# Patient Record
Sex: FEMALE | Race: WHITE | Employment: OTHER | URBAN - METROPOLITAN AREA
[De-identification: names, ages, dates, MRNs, and addresses within clinical notes are randomized per-mention and may not be internally consistent; named-entity substitution may affect disease eponyms.]

---

## 2017-05-17 ENCOUNTER — OFFICE VISIT (OUTPATIENT)
Dept: CARDIOLOGY CLINIC | Age: 67
End: 2017-05-17

## 2017-05-17 VITALS
DIASTOLIC BLOOD PRESSURE: 70 MMHG | HEIGHT: 61 IN | OXYGEN SATURATION: 96 % | HEART RATE: 74 BPM | WEIGHT: 113 LBS | BODY MASS INDEX: 21.34 KG/M2 | SYSTOLIC BLOOD PRESSURE: 98 MMHG

## 2017-05-17 DIAGNOSIS — I73.9 PERIPHERAL VASCULAR DISEASE (HCC): ICD-10-CM

## 2017-05-17 DIAGNOSIS — E78.00 PURE HYPERCHOLESTEROLEMIA: ICD-10-CM

## 2017-05-17 DIAGNOSIS — I25.10 CORONARY ARTERY DISEASE INVOLVING NATIVE CORONARY ARTERY OF NATIVE HEART WITHOUT ANGINA PECTORIS: Primary | ICD-10-CM

## 2017-05-17 PROCEDURE — 4040F PNEUMOC VAC/ADMIN/RCVD: CPT | Performed by: INTERNAL MEDICINE

## 2017-05-17 PROCEDURE — G8400 PT W/DXA NO RESULTS DOC: HCPCS | Performed by: INTERNAL MEDICINE

## 2017-05-17 PROCEDURE — 3017F COLORECTAL CA SCREEN DOC REV: CPT | Performed by: INTERNAL MEDICINE

## 2017-05-17 PROCEDURE — 1036F TOBACCO NON-USER: CPT | Performed by: INTERNAL MEDICINE

## 2017-05-17 PROCEDURE — 1123F ACP DISCUSS/DSCN MKR DOCD: CPT | Performed by: INTERNAL MEDICINE

## 2017-05-17 PROCEDURE — G8419 CALC BMI OUT NRM PARAM NOF/U: HCPCS | Performed by: INTERNAL MEDICINE

## 2017-05-17 PROCEDURE — 99214 OFFICE O/P EST MOD 30 MIN: CPT | Performed by: INTERNAL MEDICINE

## 2017-05-17 PROCEDURE — G8427 DOCREV CUR MEDS BY ELIG CLIN: HCPCS | Performed by: INTERNAL MEDICINE

## 2017-05-17 PROCEDURE — 1090F PRES/ABSN URINE INCON ASSESS: CPT | Performed by: INTERNAL MEDICINE

## 2017-05-17 PROCEDURE — 3014F SCREEN MAMMO DOC REV: CPT | Performed by: INTERNAL MEDICINE

## 2017-05-17 PROCEDURE — G8598 ASA/ANTIPLAT THER USED: HCPCS | Performed by: INTERNAL MEDICINE

## 2017-05-17 RX ORDER — METOPROLOL TARTRATE 50 MG/1
25 TABLET, FILM COATED ORAL DAILY
Qty: 45 TABLET | Refills: 3 | Status: SHIPPED | OUTPATIENT
Start: 2017-05-17 | End: 2017-10-26 | Stop reason: SDUPTHER

## 2017-05-17 RX ORDER — NITROGLYCERIN 0.4 MG/1
0.4 TABLET SUBLINGUAL EVERY 5 MIN PRN
Qty: 25 TABLET | Refills: 5 | Status: SHIPPED | OUTPATIENT
Start: 2017-05-17 | End: 2021-10-04 | Stop reason: CLARIF

## 2017-05-17 RX ORDER — CLOPIDOGREL BISULFATE 75 MG/1
75 TABLET ORAL DAILY
Qty: 90 TABLET | Refills: 3 | Status: SHIPPED | OUTPATIENT
Start: 2017-05-17 | End: 2017-10-26 | Stop reason: SDUPTHER

## 2017-05-17 RX ORDER — ROSUVASTATIN CALCIUM 20 MG/1
20 TABLET, COATED ORAL DAILY
Qty: 90 TABLET | Refills: 3 | Status: SHIPPED | OUTPATIENT
Start: 2017-05-17 | End: 2017-11-28 | Stop reason: SDUPTHER

## 2017-05-17 RX ORDER — FUROSEMIDE 40 MG/1
40 TABLET ORAL DAILY PRN
Qty: 90 TABLET | Refills: 3 | Status: SHIPPED | OUTPATIENT
Start: 2017-05-17 | End: 2017-10-26 | Stop reason: SDUPTHER

## 2017-05-17 RX ORDER — ISOSORBIDE MONONITRATE 60 MG/1
60 TABLET, EXTENDED RELEASE ORAL EVERY MORNING
Qty: 90 TABLET | Refills: 3 | Status: SHIPPED | OUTPATIENT
Start: 2017-05-17 | End: 2017-10-26 | Stop reason: SDUPTHER

## 2017-05-17 ASSESSMENT — ENCOUNTER SYMPTOMS
ABDOMINAL PAIN: 0
SHORTNESS OF BREATH: 0
COLOR CHANGE: 0
EYE REDNESS: 0
CHEST TIGHTNESS: 1
EYE PAIN: 0
COUGH: 0
WHEEZING: 0
BLOOD IN STOOL: 0

## 2017-10-26 RX ORDER — FUROSEMIDE 40 MG/1
40 TABLET ORAL DAILY PRN
Qty: 90 TABLET | Refills: 3 | Status: SHIPPED | OUTPATIENT
Start: 2017-10-26 | End: 2017-11-28 | Stop reason: SDUPTHER

## 2017-10-26 RX ORDER — ISOSORBIDE MONONITRATE 60 MG/1
60 TABLET, EXTENDED RELEASE ORAL EVERY MORNING
Qty: 90 TABLET | Refills: 3 | Status: SHIPPED | OUTPATIENT
Start: 2017-10-26 | End: 2017-11-28 | Stop reason: SDUPTHER

## 2017-10-26 RX ORDER — METOPROLOL TARTRATE 50 MG/1
25 TABLET, FILM COATED ORAL DAILY
Qty: 45 TABLET | Refills: 3 | Status: SHIPPED | OUTPATIENT
Start: 2017-10-26 | End: 2017-11-28 | Stop reason: SDUPTHER

## 2017-10-26 RX ORDER — CLOPIDOGREL BISULFATE 75 MG/1
75 TABLET ORAL DAILY
Qty: 90 TABLET | Refills: 3 | Status: SHIPPED | OUTPATIENT
Start: 2017-10-26 | End: 2017-11-28 | Stop reason: SDUPTHER

## 2017-11-28 ENCOUNTER — OFFICE VISIT (OUTPATIENT)
Dept: CARDIOLOGY CLINIC | Age: 67
End: 2017-11-28

## 2017-11-28 VITALS
WEIGHT: 118.2 LBS | HEART RATE: 71 BPM | HEIGHT: 61 IN | BODY MASS INDEX: 22.31 KG/M2 | OXYGEN SATURATION: 98 % | DIASTOLIC BLOOD PRESSURE: 50 MMHG | SYSTOLIC BLOOD PRESSURE: 120 MMHG

## 2017-11-28 DIAGNOSIS — I65.23 BILATERAL CAROTID ARTERY STENOSIS: ICD-10-CM

## 2017-11-28 DIAGNOSIS — R55 VASOVAGAL SYNCOPE: ICD-10-CM

## 2017-11-28 DIAGNOSIS — E78.00 PURE HYPERCHOLESTEROLEMIA: ICD-10-CM

## 2017-11-28 DIAGNOSIS — I25.10 ATHEROSCLEROSIS OF NATIVE CORONARY ARTERY OF NATIVE HEART WITHOUT ANGINA PECTORIS: Primary | ICD-10-CM

## 2017-11-28 PROCEDURE — 1090F PRES/ABSN URINE INCON ASSESS: CPT | Performed by: INTERNAL MEDICINE

## 2017-11-28 PROCEDURE — G8598 ASA/ANTIPLAT THER USED: HCPCS | Performed by: INTERNAL MEDICINE

## 2017-11-28 PROCEDURE — 99214 OFFICE O/P EST MOD 30 MIN: CPT | Performed by: INTERNAL MEDICINE

## 2017-11-28 PROCEDURE — G8400 PT W/DXA NO RESULTS DOC: HCPCS | Performed by: INTERNAL MEDICINE

## 2017-11-28 PROCEDURE — G8427 DOCREV CUR MEDS BY ELIG CLIN: HCPCS | Performed by: INTERNAL MEDICINE

## 2017-11-28 PROCEDURE — G8484 FLU IMMUNIZE NO ADMIN: HCPCS | Performed by: INTERNAL MEDICINE

## 2017-11-28 PROCEDURE — 4040F PNEUMOC VAC/ADMIN/RCVD: CPT | Performed by: INTERNAL MEDICINE

## 2017-11-28 PROCEDURE — 1123F ACP DISCUSS/DSCN MKR DOCD: CPT | Performed by: INTERNAL MEDICINE

## 2017-11-28 PROCEDURE — 3014F SCREEN MAMMO DOC REV: CPT | Performed by: INTERNAL MEDICINE

## 2017-11-28 PROCEDURE — G8420 CALC BMI NORM PARAMETERS: HCPCS | Performed by: INTERNAL MEDICINE

## 2017-11-28 PROCEDURE — 1036F TOBACCO NON-USER: CPT | Performed by: INTERNAL MEDICINE

## 2017-11-28 PROCEDURE — 3017F COLORECTAL CA SCREEN DOC REV: CPT | Performed by: INTERNAL MEDICINE

## 2017-11-28 RX ORDER — CLOPIDOGREL BISULFATE 75 MG/1
75 TABLET ORAL DAILY
Qty: 90 TABLET | Refills: 3 | Status: SHIPPED | OUTPATIENT
Start: 2017-11-28 | End: 2018-06-06 | Stop reason: SDUPTHER

## 2017-11-28 RX ORDER — FUROSEMIDE 40 MG/1
40 TABLET ORAL DAILY PRN
Qty: 90 TABLET | Refills: 3 | Status: SHIPPED | OUTPATIENT
Start: 2017-11-28 | End: 2019-11-20 | Stop reason: DRUGHIGH

## 2017-11-28 RX ORDER — ISOSORBIDE MONONITRATE 60 MG/1
60 TABLET, EXTENDED RELEASE ORAL EVERY MORNING
Qty: 90 TABLET | Refills: 3 | Status: SHIPPED | OUTPATIENT
Start: 2017-11-28 | End: 2018-06-06 | Stop reason: SDUPTHER

## 2017-11-28 RX ORDER — FUROSEMIDE 40 MG/1
40 TABLET ORAL DAILY PRN
Qty: 90 TABLET | Refills: 3 | Status: CANCELLED | OUTPATIENT
Start: 2017-11-28

## 2017-11-28 RX ORDER — ROSUVASTATIN CALCIUM 20 MG/1
20 TABLET, COATED ORAL DAILY
Qty: 90 TABLET | Refills: 3 | Status: SHIPPED | OUTPATIENT
Start: 2017-11-28 | End: 2018-06-06 | Stop reason: SDUPTHER

## 2017-11-28 RX ORDER — METOPROLOL TARTRATE 50 MG/1
25 TABLET, FILM COATED ORAL DAILY
Qty: 45 TABLET | Refills: 3 | Status: SHIPPED | OUTPATIENT
Start: 2017-11-28 | End: 2018-06-06 | Stop reason: SDUPTHER

## 2017-11-28 RX ORDER — CLOPIDOGREL BISULFATE 75 MG/1
75 TABLET ORAL DAILY
Qty: 90 TABLET | Refills: 3 | Status: CANCELLED | OUTPATIENT
Start: 2017-11-28

## 2017-11-28 ASSESSMENT — ENCOUNTER SYMPTOMS
WHEEZING: 0
COLOR CHANGE: 0
EYE PAIN: 0
BLOOD IN STOOL: 0
COUGH: 0
SHORTNESS OF BREATH: 1
ABDOMINAL PAIN: 0
CHEST TIGHTNESS: 0
EYE REDNESS: 0

## 2017-11-28 NOTE — LETTER
[address] Cardiology - [address] [facility] [address]  [address]   [address] [address]  Phone: [phone]  Fax: [phone]    Emigdio Blum MD        November 30, 2017     [address] [place] [facility] [address]    Patient: Tai Dominguez  MR Number: [id number]  YOB: 1950  Date of Visit: 11/28/2017    Dear Dr. Peyton Posey:    HPI Tai Dominguez denies any change in her chronic chest pain, leg swelling and increased dyspnea. She states that she is light-headed and passed out a few months ago  She states that she fell and broke her ribs. She has passed out several times always when standing. Sometimes she passed out without warning, at other times she feels lightheaded before passing out. She reports having a fast heart rate after passing out and at night. Assessment:        CAD (coronary artery disease)     s/p CABG 2000. Cath 9/2010- 1/3 grafts occluded. Patent LIMA-LAD, patent SVG-OM, 60% distal LAD lesion distal to anastamosis, occluded SVG-LCX,, 70% prox native LCx, diffuse RCA dz. Declines redo CABG. Try medical therapy, consider PCI of LAD and LCX in future if medical therapy fails. Nuc GXT 9/23/14 no ischemia. Echo 12/2014 LVEF 55-60%.  Diabetes mellitus     followed by PCP.  Hyperlipidemia     On statin  ( muscle aches on higher doses of statins.)  LDL 78.  Syncope     Likely secondary to medications and hypotension. R/o arrythymia.  Lung mass      benign    Carotid artery disease - UL 4/2017 50-69% BICAS. Hypotension -chronic, asymptomatic. Improved. Intrascapular pain/ right sided chest soreness -chronic since CABG, unchanged. PAD/Claudication- Bilateral occlusion of posterior tibial arteries, no evidence of aneurysm. Evaluated by Dr. Jeannie Willams in past.                  Plan:       No evidence of CHF. No angina.  In view of syncope/falls, will arrange event monitor to exclude arrhythmia. Advised to wear GABBIE hose. Orthostatic precautions discussed. Consider loop recorder. Continue ongoing risk factor modification. Fasting lipid profile, CMP prior to next visit. Advised to call if recurrent syncope. If you have questions, please do not hesitate to call me. I look forward to following Lisa Romero along with you.     Sincerely,        Toby Griffith MD

## 2017-11-28 NOTE — PROGRESS NOTES
heard.  Pulmonary/Chest: Effort normal and breath sounds normal.   Abdominal: Soft. Bowel sounds are normal.   Musculoskeletal: Normal range of motion. Neurological: She is alert and oriented to person, place, and time. Skin: Skin is warm and dry. Psychiatric: She has a normal mood and affect. Her behavior is normal.   Nursing note and vitals reviewed. Blood pressure (!) 120/50, pulse 71, height 5' 1\" (1.549 m), weight 118 lb 3.2 oz (53.6 kg), SpO2 98 %. Vitals:    11/28/17 1528 11/28/17 1532 11/28/17 1533   BP: 132/78 120/70 (!) 120/50   Site: Right Arm Right Arm Right Arm   Position: Supine Sitting Standing   Cuff Size: Medium Adult Medium Adult Medium Adult   Pulse: 68 68 71   SpO2: 98%     Weight: 118 lb 3.2 oz (53.6 kg)     Height: 5' 1\" (1.549 m)       Body mass index is 22.33 kg/m².      Wt Readings from Last 3 Encounters:   11/28/17 118 lb 3.2 oz (53.6 kg)   05/17/17 113 lb (51.3 kg)   10/12/16 110 lb 12.8 oz (50.3 kg)     BP Readings from Last 3 Encounters:   11/28/17 (!) 120/50   05/17/17 98/70   10/12/16 120/60        Current Outpatient Prescriptions   Medication Sig Dispense Refill    clopidogrel (PLAVIX) 75 MG tablet Take 1 tablet by mouth daily 90 tablet 3    furosemide (LASIX) 40 MG tablet Take 1 tablet by mouth daily as needed (for edema) 90 tablet 3    isosorbide mononitrate (IMDUR) 60 MG extended release tablet Take 1 tablet by mouth every morning 90 tablet 3    metoprolol tartrate (LOPRESSOR) 50 MG tablet Take 0.5 tablets by mouth daily 45 tablet 3    rosuvastatin (CRESTOR) 20 MG tablet Take 1 tablet by mouth daily 90 tablet 3    nitroGLYCERIN (NITROSTAT) 0.4 MG SL tablet Place 1 tablet under the tongue every 5 minutes as needed for Chest pain 25 tablet 5    Insulin Degludec (TRESIBA FLEXTOUCH SC) Inject into the skin      metFORMIN (GLUCOPHAGE) 500 MG tablet Take 1,000 mg by mouth daily (with breakfast)       ALPRAZolam (XANAX) 0.25 MG tablet Take 0.25 mg by mouth nightly as needed.  aspirin 325 MG tablet Take 1 tablet by mouth daily. 90 tablet 3    oxycodone-acetaminophen (PERCOCET) 5-325 MG per tablet Take 1 tablet by mouth 2 times daily as needed. No current facility-administered medications for this visit. Past Surgical History:   Procedure Laterality Date    BREAST SURGERY      left breast tumor removed    CARDIAC SURGERY  2000    CABG    CHOLECYSTECTOMY      CORONARY ARTERY BYPASS GRAFT      KNEE SURGERY       Social History   Substance Use Topics    Smoking status: Never Smoker    Smokeless tobacco: Never Used    Alcohol use No     Allergies   Allergen Reactions    Iodine Hives     Family History   Problem Relation Age of Onset    Stroke Mother     Heart Disease Father        Recent labs and imaging reviewed. Assessment:        CAD (coronary artery disease)     s/p CABG 2000. Cath 9/2010- 1/3 grafts occluded. Patent LIMA-LAD, patent SVG-OM, 60% distal LAD lesion distal to anastamosis, occluded SVG-LCX,, 70% prox native LCx, diffuse RCA dz. Declines redo CABG. Try medical therapy, consider PCI of LAD and LCX in future if medical therapy fails. Nuc GXT 9/23/14 no ischemia. Echo 12/2014 LVEF 55-60%.  Diabetes mellitus     followed by PCP.  Hyperlipidemia     On statin  ( muscle aches on higher doses of statins.)  LDL 78.  Syncope     Likely secondary to medications and hypotension. R/o arrythymia.  Lung mass      benign    Carotid artery disease - UL 4/2017 50-69% BICAS. Hypotension -chronic, asymptomatic. Improved. Intrascapular pain/ right sided chest soreness -chronic since CABG, unchanged. PAD/Claudication- Bilateral occlusion of posterior tibial arteries, no evidence of aneurysm. Evaluated by Dr. Jeannie Willams in past.                  Plan:       No evidence of CHF. No angina. In view of syncope/falls, will arrange event monitor to exclude arrhythmia. Advised to wear GABBIE hose.  Orthostatic precautions

## 2017-11-30 NOTE — COMMUNICATION BODY
HPI Cristofer Lemus denies any change in her chronic chest pain, leg swelling and increased dyspnea. She states that she is light-headed and passed out a few months ago  She states that she fell and broke her ribs. She has passed out several times always when standing. Sometimes she passed out without warning, at other times she feels lightheaded before passing out. She reports having a fast heart rate after passing out and at night. Assessment:        CAD (coronary artery disease)     s/p CABG 2000. Cath 9/2010- 1/3 grafts occluded. Patent LIMA-LAD, patent SVG-OM, 60% distal LAD lesion distal to anastamosis, occluded SVG-LCX,, 70% prox native LCx, diffuse RCA dz. Declines redo CABG. Try medical therapy, consider PCI of LAD and LCX in future if medical therapy fails. Nuc GXT 9/23/14 no ischemia. Echo 12/2014 LVEF 55-60%.  Diabetes mellitus     followed by PCP.  Hyperlipidemia     On statin  ( muscle aches on higher doses of statins.)  LDL 78.  Syncope     Likely secondary to medications and hypotension. R/o arrythymia.  Lung mass      benign    Carotid artery disease - UL 4/2017 50-69% BICAS. Hypotension -chronic, asymptomatic. Improved. Intrascapular pain/ right sided chest soreness -chronic since CABG, unchanged. PAD/Claudication- Bilateral occlusion of posterior tibial arteries, no evidence of aneurysm. Evaluated by Dr. Sergei Hinojosa in past.                  Plan:       No evidence of CHF. No angina. In view of syncope/falls, will arrange event monitor to exclude arrhythmia. Advised to wear GABBIE hose. Orthostatic precautions discussed. Consider loop recorder. Continue ongoing risk factor modification. Fasting lipid profile, CMP prior to next visit. Advised to call if recurrent syncope.

## 2018-06-05 PROBLEM — E78.00 PURE HYPERCHOLESTEROLEMIA: Status: ACTIVE | Noted: 2018-06-05

## 2018-06-05 ASSESSMENT — ENCOUNTER SYMPTOMS
ABDOMINAL PAIN: 0
EYE PAIN: 0
BLOOD IN STOOL: 0
SHORTNESS OF BREATH: 0
CHEST TIGHTNESS: 0
COUGH: 0
WHEEZING: 0
COLOR CHANGE: 0
EYE REDNESS: 0

## 2018-06-06 ENCOUNTER — OFFICE VISIT (OUTPATIENT)
Dept: CARDIOLOGY CLINIC | Age: 68
End: 2018-06-06

## 2018-06-06 VITALS
DIASTOLIC BLOOD PRESSURE: 80 MMHG | BODY MASS INDEX: 21.71 KG/M2 | WEIGHT: 115 LBS | HEIGHT: 61 IN | SYSTOLIC BLOOD PRESSURE: 120 MMHG | HEART RATE: 71 BPM | OXYGEN SATURATION: 98 %

## 2018-06-06 DIAGNOSIS — I65.23 BILATERAL CAROTID ARTERY STENOSIS: ICD-10-CM

## 2018-06-06 DIAGNOSIS — I25.118 CORONARY ARTERY DISEASE OF NATIVE ARTERY OF NATIVE HEART WITH STABLE ANGINA PECTORIS (HCC): Primary | ICD-10-CM

## 2018-06-06 DIAGNOSIS — R55 VASOVAGAL SYNCOPE: ICD-10-CM

## 2018-06-06 DIAGNOSIS — E78.00 PURE HYPERCHOLESTEROLEMIA: ICD-10-CM

## 2018-06-06 PROCEDURE — G8427 DOCREV CUR MEDS BY ELIG CLIN: HCPCS | Performed by: INTERNAL MEDICINE

## 2018-06-06 PROCEDURE — G8420 CALC BMI NORM PARAMETERS: HCPCS | Performed by: INTERNAL MEDICINE

## 2018-06-06 PROCEDURE — G8598 ASA/ANTIPLAT THER USED: HCPCS | Performed by: INTERNAL MEDICINE

## 2018-06-06 PROCEDURE — 4040F PNEUMOC VAC/ADMIN/RCVD: CPT | Performed by: INTERNAL MEDICINE

## 2018-06-06 PROCEDURE — G8400 PT W/DXA NO RESULTS DOC: HCPCS | Performed by: INTERNAL MEDICINE

## 2018-06-06 PROCEDURE — 3017F COLORECTAL CA SCREEN DOC REV: CPT | Performed by: INTERNAL MEDICINE

## 2018-06-06 PROCEDURE — 1090F PRES/ABSN URINE INCON ASSESS: CPT | Performed by: INTERNAL MEDICINE

## 2018-06-06 PROCEDURE — 1036F TOBACCO NON-USER: CPT | Performed by: INTERNAL MEDICINE

## 2018-06-06 PROCEDURE — 1123F ACP DISCUSS/DSCN MKR DOCD: CPT | Performed by: INTERNAL MEDICINE

## 2018-06-06 PROCEDURE — 99214 OFFICE O/P EST MOD 30 MIN: CPT | Performed by: INTERNAL MEDICINE

## 2018-06-06 RX ORDER — ISOSORBIDE MONONITRATE 60 MG/1
60 TABLET, EXTENDED RELEASE ORAL EVERY MORNING
Qty: 90 TABLET | Refills: 3 | Status: SHIPPED | OUTPATIENT
Start: 2018-06-06 | End: 2019-12-04 | Stop reason: DRUGHIGH

## 2018-06-06 RX ORDER — ROSUVASTATIN CALCIUM 20 MG/1
20 TABLET, COATED ORAL DAILY
Qty: 90 TABLET | Refills: 3 | Status: ON HOLD | OUTPATIENT
Start: 2018-06-06 | End: 2019-10-11

## 2018-06-06 RX ORDER — MECLIZINE HYDROCHLORIDE 25 MG/1
25 TABLET ORAL 3 TIMES DAILY PRN
Qty: 90 TABLET | Refills: 0 | Status: SHIPPED | OUTPATIENT
Start: 2018-06-06 | End: 2018-06-16

## 2018-06-06 RX ORDER — ASPIRIN 325 MG
325 TABLET ORAL DAILY
Qty: 90 TABLET | Refills: 3 | Status: ON HOLD | OUTPATIENT
Start: 2018-06-06 | End: 2019-10-11

## 2018-06-06 RX ORDER — METOPROLOL TARTRATE 50 MG/1
25 TABLET, FILM COATED ORAL DAILY
Qty: 45 TABLET | Refills: 3 | Status: ON HOLD | OUTPATIENT
Start: 2018-06-06 | End: 2019-10-11

## 2018-06-06 RX ORDER — CLOPIDOGREL BISULFATE 75 MG/1
75 TABLET ORAL DAILY
Qty: 90 TABLET | Refills: 3 | Status: ON HOLD | OUTPATIENT
Start: 2018-06-06 | End: 2019-11-26 | Stop reason: HOSPADM

## 2018-06-21 DIAGNOSIS — I65.23 BILATERAL CAROTID ARTERY STENOSIS: Primary | ICD-10-CM

## 2019-10-10 ENCOUNTER — HOSPITAL ENCOUNTER (INPATIENT)
Dept: CARDIAC CATH/INVASIVE PROCEDURES | Age: 69
LOS: 5 days | Discharge: HOME HEALTH | DRG: 282 | End: 2019-10-15
Attending: INTERNAL MEDICINE | Admitting: INTERNAL MEDICINE
Payer: MEDICARE

## 2019-10-10 PROBLEM — D64.9 ANEMIA: Status: ACTIVE | Noted: 2019-10-10

## 2019-10-10 PROBLEM — Z98.890 STATUS POST LEFT HEART CATHETERIZATION: Status: ACTIVE | Noted: 2019-10-10

## 2019-10-10 LAB
ALBUMIN SERPL-MCNC: 3.1 G/DL (ref 3.4–5)
ALP BLD-CCNC: 77 U/L (ref 40–129)
ALT SERPL-CCNC: 14 U/L (ref 10–40)
AST SERPL-CCNC: 16 U/L (ref 15–37)
BILIRUB SERPL-MCNC: <0.2 MG/DL (ref 0–1)
BILIRUBIN DIRECT: <0.2 MG/DL (ref 0–0.3)
BILIRUBIN, INDIRECT: ABNORMAL MG/DL (ref 0–1)
FERRITIN: 282.1 NG/ML (ref 15–150)
FOLATE: 12.88 NG/ML (ref 4.78–24.2)
GLUCOSE BLD-MCNC: 557 MG/DL (ref 70–99)
GLUCOSE BLD-MCNC: 590 MG/DL (ref 70–99)
GLUCOSE BLD-MCNC: 598 MG/DL (ref 70–99)
HCT VFR BLD CALC: 28.5 % (ref 36–48)
IMMATURE RETIC FRACT: 0.61 (ref 0.21–0.37)
INR BLD: 0.96 (ref 0.86–1.14)
IRON SATURATION: 35 % (ref 15–50)
IRON: 70 UG/DL (ref 37–145)
PERFORMED ON: ABNORMAL
PERFORMED ON: ABNORMAL
PROTHROMBIN TIME: 10.9 SEC (ref 9.8–13)
RETICULOCYTE ABSOLUTE COUNT: 0.1 M/UL (ref 0.02–0.1)
RETICULOCYTE COUNT PCT: 3.23 % (ref 0.5–2.18)
TOTAL IRON BINDING CAPACITY: 202 UG/DL (ref 260–445)
TOTAL PROTEIN: 5.1 G/DL (ref 6.4–8.2)
TROPONIN: 0.03 NG/ML
VITAMIN B-12: 285 PG/ML (ref 211–911)

## 2019-10-10 PROCEDURE — 99223 1ST HOSP IP/OBS HIGH 75: CPT | Performed by: INTERNAL MEDICINE

## 2019-10-10 PROCEDURE — 6360000002 HC RX W HCPCS: Performed by: INTERNAL MEDICINE

## 2019-10-10 PROCEDURE — 82607 VITAMIN B-12: CPT

## 2019-10-10 PROCEDURE — 85045 AUTOMATED RETICULOCYTE COUNT: CPT

## 2019-10-10 PROCEDURE — 2580000003 HC RX 258: Performed by: INTERNAL MEDICINE

## 2019-10-10 PROCEDURE — 82746 ASSAY OF FOLIC ACID SERUM: CPT

## 2019-10-10 PROCEDURE — 82947 ASSAY GLUCOSE BLOOD QUANT: CPT

## 2019-10-10 PROCEDURE — 6370000000 HC RX 637 (ALT 250 FOR IP): Performed by: INTERNAL MEDICINE

## 2019-10-10 PROCEDURE — 83540 ASSAY OF IRON: CPT

## 2019-10-10 PROCEDURE — 80076 HEPATIC FUNCTION PANEL: CPT

## 2019-10-10 PROCEDURE — C9113 INJ PANTOPRAZOLE SODIUM, VIA: HCPCS | Performed by: INTERNAL MEDICINE

## 2019-10-10 PROCEDURE — 85610 PROTHROMBIN TIME: CPT

## 2019-10-10 PROCEDURE — 85347 COAGULATION TIME ACTIVATED: CPT

## 2019-10-10 PROCEDURE — 36415 COLL VENOUS BLD VENIPUNCTURE: CPT

## 2019-10-10 PROCEDURE — 84484 ASSAY OF TROPONIN QUANT: CPT

## 2019-10-10 PROCEDURE — 82728 ASSAY OF FERRITIN: CPT

## 2019-10-10 PROCEDURE — 99212 OFFICE O/P EST SF 10 MIN: CPT

## 2019-10-10 PROCEDURE — 83550 IRON BINDING TEST: CPT

## 2019-10-10 PROCEDURE — 2060000000 HC ICU INTERMEDIATE R&B

## 2019-10-10 RX ORDER — SODIUM CHLORIDE 0.9 % (FLUSH) 0.9 %
10 SYRINGE (ML) INJECTION PRN
Status: DISCONTINUED | OUTPATIENT
Start: 2019-10-10 | End: 2019-10-15 | Stop reason: HOSPADM

## 2019-10-10 RX ORDER — ALPRAZOLAM 0.25 MG/1
0.25 TABLET ORAL NIGHTLY PRN
Status: DISCONTINUED | OUTPATIENT
Start: 2019-10-10 | End: 2019-10-15 | Stop reason: HOSPADM

## 2019-10-10 RX ORDER — ASPIRIN 81 MG/1
81 TABLET, CHEWABLE ORAL DAILY
Status: DISCONTINUED | OUTPATIENT
Start: 2019-10-11 | End: 2019-10-10

## 2019-10-10 RX ORDER — ONDANSETRON 2 MG/ML
4 INJECTION INTRAMUSCULAR; INTRAVENOUS EVERY 6 HOURS PRN
Status: DISCONTINUED | OUTPATIENT
Start: 2019-10-10 | End: 2019-10-15 | Stop reason: HOSPADM

## 2019-10-10 RX ORDER — ONDANSETRON 2 MG/ML
4 INJECTION INTRAMUSCULAR; INTRAVENOUS EVERY 6 HOURS PRN
Status: DISCONTINUED | OUTPATIENT
Start: 2019-10-10 | End: 2019-10-10 | Stop reason: SDUPTHER

## 2019-10-10 RX ORDER — ROSUVASTATIN CALCIUM 10 MG/1
20 TABLET, COATED ORAL DAILY
Status: DISCONTINUED | OUTPATIENT
Start: 2019-10-10 | End: 2019-10-10

## 2019-10-10 RX ORDER — OXYCODONE HYDROCHLORIDE AND ACETAMINOPHEN 5; 325 MG/1; MG/1
1 TABLET ORAL 2 TIMES DAILY PRN
Status: DISCONTINUED | OUTPATIENT
Start: 2019-10-10 | End: 2019-10-15 | Stop reason: HOSPADM

## 2019-10-10 RX ORDER — ACETAMINOPHEN 325 MG/1
650 TABLET ORAL EVERY 4 HOURS PRN
Status: DISCONTINUED | OUTPATIENT
Start: 2019-10-10 | End: 2019-10-15 | Stop reason: HOSPADM

## 2019-10-10 RX ORDER — SODIUM CHLORIDE 0.9 % (FLUSH) 0.9 %
10 SYRINGE (ML) INJECTION EVERY 12 HOURS SCHEDULED
Status: DISCONTINUED | OUTPATIENT
Start: 2019-10-10 | End: 2019-10-10 | Stop reason: SDUPTHER

## 2019-10-10 RX ORDER — DEXTROSE MONOHYDRATE 25 G/50ML
12.5 INJECTION, SOLUTION INTRAVENOUS PRN
Status: DISCONTINUED | OUTPATIENT
Start: 2019-10-10 | End: 2019-10-15 | Stop reason: HOSPADM

## 2019-10-10 RX ORDER — 0.9 % SODIUM CHLORIDE 0.9 %
10 VIAL (ML) INJECTION 2 TIMES DAILY
Status: DISCONTINUED | OUTPATIENT
Start: 2019-10-10 | End: 2019-10-15

## 2019-10-10 RX ORDER — NICOTINE POLACRILEX 4 MG
15 LOZENGE BUCCAL PRN
Status: DISCONTINUED | OUTPATIENT
Start: 2019-10-10 | End: 2019-10-15 | Stop reason: HOSPADM

## 2019-10-10 RX ORDER — SODIUM CHLORIDE 0.9 % (FLUSH) 0.9 %
10 SYRINGE (ML) INJECTION PRN
Status: DISCONTINUED | OUTPATIENT
Start: 2019-10-10 | End: 2019-10-10 | Stop reason: SDUPTHER

## 2019-10-10 RX ORDER — ROSUVASTATIN CALCIUM 10 MG/1
20 TABLET, COATED ORAL NIGHTLY
Status: DISCONTINUED | OUTPATIENT
Start: 2019-10-10 | End: 2019-10-11

## 2019-10-10 RX ORDER — CLOPIDOGREL BISULFATE 75 MG/1
75 TABLET ORAL DAILY
Status: DISCONTINUED | OUTPATIENT
Start: 2019-10-11 | End: 2019-10-15 | Stop reason: HOSPADM

## 2019-10-10 RX ORDER — DEXTROSE MONOHYDRATE 50 MG/ML
100 INJECTION, SOLUTION INTRAVENOUS PRN
Status: DISCONTINUED | OUTPATIENT
Start: 2019-10-10 | End: 2019-10-15 | Stop reason: HOSPADM

## 2019-10-10 RX ORDER — ASPIRIN 81 MG/1
81 TABLET, CHEWABLE ORAL DAILY
Status: DISCONTINUED | OUTPATIENT
Start: 2019-10-10 | End: 2019-10-15 | Stop reason: HOSPADM

## 2019-10-10 RX ORDER — SODIUM CHLORIDE 0.9 % (FLUSH) 0.9 %
10 SYRINGE (ML) INJECTION EVERY 12 HOURS SCHEDULED
Status: DISCONTINUED | OUTPATIENT
Start: 2019-10-10 | End: 2019-10-15 | Stop reason: HOSPADM

## 2019-10-10 RX ORDER — PANTOPRAZOLE SODIUM 40 MG/10ML
40 INJECTION, POWDER, LYOPHILIZED, FOR SOLUTION INTRAVENOUS 2 TIMES DAILY
Status: DISCONTINUED | OUTPATIENT
Start: 2019-10-10 | End: 2019-10-15

## 2019-10-10 RX ADMIN — INSULIN LISPRO 3 UNITS: 100 INJECTION, SOLUTION INTRAVENOUS; SUBCUTANEOUS at 22:45

## 2019-10-10 RX ADMIN — PANTOPRAZOLE SODIUM 40 MG: 40 INJECTION, POWDER, FOR SOLUTION INTRAVENOUS at 22:46

## 2019-10-10 RX ADMIN — ROSUVASTATIN CALCIUM 20 MG: 10 TABLET, FILM COATED ORAL at 22:46

## 2019-10-10 RX ADMIN — METOPROLOL TARTRATE 25 MG: 25 TABLET ORAL at 22:46

## 2019-10-10 RX ADMIN — Medication 10 ML: at 22:55

## 2019-10-10 RX ADMIN — Medication 10 ML: at 22:46

## 2019-10-10 RX ADMIN — ASPIRIN 81 MG 81 MG: 81 TABLET ORAL at 22:55

## 2019-10-11 PROBLEM — I21.4 NSTEMI (NON-ST ELEVATED MYOCARDIAL INFARCTION) (HCC): Status: ACTIVE | Noted: 2019-10-11

## 2019-10-11 LAB
ALBUMIN SERPL-MCNC: 3.1 G/DL (ref 3.4–5)
ANION GAP SERPL CALCULATED.3IONS-SCNC: 17 MMOL/L (ref 3–16)
BASOPHILS ABSOLUTE: 0 K/UL (ref 0–0.2)
BASOPHILS RELATIVE PERCENT: 0.3 %
BILIRUBIN URINE: NEGATIVE
BLOOD, URINE: NEGATIVE
BUN BLDV-MCNC: 35 MG/DL (ref 7–20)
CALCIUM SERPL-MCNC: 8.6 MG/DL (ref 8.3–10.6)
CHLORIDE BLD-SCNC: 104 MMOL/L (ref 99–110)
CLARITY: CLEAR
CO2: 17 MMOL/L (ref 21–32)
COLOR: YELLOW
CREAT SERPL-MCNC: 1.2 MG/DL (ref 0.6–1.2)
EKG ATRIAL RATE: 57 BPM
EKG DIAGNOSIS: NORMAL
EKG P AXIS: 36 DEGREES
EKG P-R INTERVAL: 174 MS
EKG Q-T INTERVAL: 440 MS
EKG QRS DURATION: 80 MS
EKG QTC CALCULATION (BAZETT): 428 MS
EKG R AXIS: 27 DEGREES
EKG T AXIS: 101 DEGREES
EKG VENTRICULAR RATE: 57 BPM
EOSINOPHILS ABSOLUTE: 0 K/UL (ref 0–0.6)
EOSINOPHILS RELATIVE PERCENT: 0.1 %
GFR AFRICAN AMERICAN: 54
GFR NON-AFRICAN AMERICAN: 45
GLUCOSE BLD-MCNC: 238 MG/DL (ref 70–99)
GLUCOSE BLD-MCNC: 241 MG/DL (ref 70–99)
GLUCOSE BLD-MCNC: 279 MG/DL (ref 70–99)
GLUCOSE BLD-MCNC: 301 MG/DL (ref 70–99)
GLUCOSE BLD-MCNC: 312 MG/DL (ref 70–99)
GLUCOSE BLD-MCNC: 415 MG/DL (ref 70–99)
GLUCOSE BLD-MCNC: 517 MG/DL (ref 70–99)
GLUCOSE URINE: 250 MG/DL
HAPTOGLOBIN: 139 MG/DL (ref 30–200)
HCT VFR BLD CALC: 27.7 % (ref 36–48)
HEMOGLOBIN: 9.5 G/DL (ref 12–16)
KETONES, URINE: NEGATIVE MG/DL
LEUKOCYTE ESTERASE, URINE: NEGATIVE
LYMPHOCYTES ABSOLUTE: 0.8 K/UL (ref 1–5.1)
LYMPHOCYTES RELATIVE PERCENT: 10.2 %
MCH RBC QN AUTO: 32.1 PG (ref 26–34)
MCHC RBC AUTO-ENTMCNC: 34.4 G/DL (ref 31–36)
MCV RBC AUTO: 93.2 FL (ref 80–100)
MICROSCOPIC EXAMINATION: YES
MONOCYTES ABSOLUTE: 0.5 K/UL (ref 0–1.3)
MONOCYTES RELATIVE PERCENT: 6.3 %
NEUTROPHILS ABSOLUTE: 6.8 K/UL (ref 1.7–7.7)
NEUTROPHILS RELATIVE PERCENT: 83.1 %
NITRITE, URINE: NEGATIVE
OCCULT BLOOD DIAGNOSTIC: NORMAL
PDW BLD-RTO: 13.2 % (ref 12.4–15.4)
PERFORMED ON: ABNORMAL
PH UA: 5.5 (ref 5–8)
PHOSPHORUS: 4.1 MG/DL (ref 2.5–4.9)
PLATELET # BLD: 156 K/UL (ref 135–450)
PMV BLD AUTO: 10.5 FL (ref 5–10.5)
POC ACT LR: 151 SEC
POTASSIUM SERPL-SCNC: 4.3 MMOL/L (ref 3.5–5.1)
PROTEIN UA: 100 MG/DL
RBC # BLD: 2.97 M/UL (ref 4–5.2)
RBC UA: ABNORMAL /HPF (ref 0–2)
SODIUM BLD-SCNC: 138 MMOL/L (ref 136–145)
SPECIFIC GRAVITY UA: 1.01 (ref 1–1.03)
TROPONIN: 0.03 NG/ML
TROPONIN: 0.04 NG/ML
URINE TYPE: ABNORMAL
UROBILINOGEN, URINE: 0.2 E.U./DL
WBC # BLD: 8.2 K/UL (ref 4–11)
WBC UA: ABNORMAL /HPF (ref 0–5)

## 2019-10-11 PROCEDURE — 80069 RENAL FUNCTION PANEL: CPT

## 2019-10-11 PROCEDURE — 81001 URINALYSIS AUTO W/SCOPE: CPT

## 2019-10-11 PROCEDURE — 6360000002 HC RX W HCPCS: Performed by: INTERNAL MEDICINE

## 2019-10-11 PROCEDURE — 99232 SBSQ HOSP IP/OBS MODERATE 35: CPT | Performed by: NURSE PRACTITIONER

## 2019-10-11 PROCEDURE — 85025 COMPLETE CBC W/AUTO DIFF WBC: CPT

## 2019-10-11 PROCEDURE — 2060000000 HC ICU INTERMEDIATE R&B

## 2019-10-11 PROCEDURE — 6370000000 HC RX 637 (ALT 250 FOR IP): Performed by: INTERNAL MEDICINE

## 2019-10-11 PROCEDURE — 93005 ELECTROCARDIOGRAM TRACING: CPT | Performed by: INTERNAL MEDICINE

## 2019-10-11 PROCEDURE — 84484 ASSAY OF TROPONIN QUANT: CPT

## 2019-10-11 PROCEDURE — C9113 INJ PANTOPRAZOLE SODIUM, VIA: HCPCS | Performed by: INTERNAL MEDICINE

## 2019-10-11 PROCEDURE — 6370000000 HC RX 637 (ALT 250 FOR IP): Performed by: NURSE PRACTITIONER

## 2019-10-11 PROCEDURE — 84155 ASSAY OF PROTEIN SERUM: CPT

## 2019-10-11 PROCEDURE — 2580000003 HC RX 258: Performed by: INTERNAL MEDICINE

## 2019-10-11 PROCEDURE — 84165 PROTEIN E-PHORESIS SERUM: CPT

## 2019-10-11 PROCEDURE — 36415 COLL VENOUS BLD VENIPUNCTURE: CPT

## 2019-10-11 PROCEDURE — 83010 ASSAY OF HAPTOGLOBIN QUANT: CPT

## 2019-10-11 PROCEDURE — 93010 ELECTROCARDIOGRAM REPORT: CPT | Performed by: INTERNAL MEDICINE

## 2019-10-11 PROCEDURE — G0328 FECAL BLOOD SCRN IMMUNOASSAY: HCPCS

## 2019-10-11 RX ORDER — PAROXETINE HYDROCHLORIDE 20 MG/1
20 TABLET, FILM COATED ORAL EVERY MORNING
COMMUNITY
End: 2021-10-04 | Stop reason: CLARIF

## 2019-10-11 RX ORDER — POTASSIUM CHLORIDE 750 MG/1
10 TABLET, FILM COATED, EXTENDED RELEASE ORAL DAILY
COMMUNITY
End: 2021-10-04 | Stop reason: CLARIF

## 2019-10-11 RX ORDER — ASPIRIN 81 MG/1
81 TABLET, CHEWABLE ORAL DAILY
COMMUNITY
End: 2021-10-04 | Stop reason: CLARIF

## 2019-10-11 RX ORDER — INSULIN GLARGINE 100 [IU]/ML
20 INJECTION, SOLUTION SUBCUTANEOUS
Status: DISCONTINUED | OUTPATIENT
Start: 2019-10-11 | End: 2019-10-15 | Stop reason: HOSPADM

## 2019-10-11 RX ORDER — ROSUVASTATIN CALCIUM 10 MG/1
40 TABLET, COATED ORAL NIGHTLY
Status: DISCONTINUED | OUTPATIENT
Start: 2019-10-11 | End: 2019-10-15 | Stop reason: HOSPADM

## 2019-10-11 RX ORDER — METOPROLOL SUCCINATE 25 MG/1
25 TABLET, EXTENDED RELEASE ORAL DAILY
Status: DISCONTINUED | OUTPATIENT
Start: 2019-10-12 | End: 2019-10-12

## 2019-10-11 RX ORDER — GABAPENTIN 100 MG/1
100 CAPSULE ORAL 3 TIMES DAILY
COMMUNITY

## 2019-10-11 RX ORDER — ROSUVASTATIN CALCIUM 40 MG/1
40 TABLET, COATED ORAL DAILY
COMMUNITY
End: 2020-03-13 | Stop reason: SDUPTHER

## 2019-10-11 RX ORDER — METOPROLOL SUCCINATE 25 MG/1
25 TABLET, EXTENDED RELEASE ORAL DAILY
Status: ON HOLD | COMMUNITY
End: 2019-11-26 | Stop reason: SDUPTHER

## 2019-10-11 RX ORDER — LISINOPRIL 5 MG/1
5 TABLET ORAL DAILY
Status: DISCONTINUED | OUTPATIENT
Start: 2019-10-11 | End: 2019-10-15

## 2019-10-11 RX ADMIN — INSULIN LISPRO 6 UNITS: 100 INJECTION, SOLUTION INTRAVENOUS; SUBCUTANEOUS at 01:15

## 2019-10-11 RX ADMIN — Medication 10 ML: at 08:58

## 2019-10-11 RX ADMIN — PANTOPRAZOLE SODIUM 40 MG: 40 INJECTION, POWDER, FOR SOLUTION INTRAVENOUS at 21:31

## 2019-10-11 RX ADMIN — ENOXAPARIN SODIUM 40 MG: 40 INJECTION SUBCUTANEOUS at 08:56

## 2019-10-11 RX ADMIN — PANTOPRAZOLE SODIUM 40 MG: 40 INJECTION, POWDER, FOR SOLUTION INTRAVENOUS at 08:57

## 2019-10-11 RX ADMIN — INSULIN LISPRO 3 UNITS: 100 INJECTION, SOLUTION INTRAVENOUS; SUBCUTANEOUS at 12:01

## 2019-10-11 RX ADMIN — INSULIN LISPRO 2 UNITS: 100 INJECTION, SOLUTION INTRAVENOUS; SUBCUTANEOUS at 16:55

## 2019-10-11 RX ADMIN — CLOPIDOGREL BISULFATE 75 MG: 75 TABLET ORAL at 08:57

## 2019-10-11 RX ADMIN — Medication 10 ML: at 21:31

## 2019-10-11 RX ADMIN — INSULIN LISPRO 1 UNITS: 100 INJECTION, SOLUTION INTRAVENOUS; SUBCUTANEOUS at 21:31

## 2019-10-11 RX ADMIN — INSULIN GLARGINE 20 UNITS: 100 INJECTION, SOLUTION SUBCUTANEOUS at 09:03

## 2019-10-11 RX ADMIN — INSULIN LISPRO 5 UNITS: 100 INJECTION, SOLUTION INTRAVENOUS; SUBCUTANEOUS at 09:01

## 2019-10-11 RX ADMIN — INSULIN LISPRO 4 UNITS: 100 INJECTION, SOLUTION INTRAVENOUS; SUBCUTANEOUS at 08:57

## 2019-10-11 RX ADMIN — INSULIN LISPRO 5 UNITS: 100 INJECTION, SOLUTION INTRAVENOUS; SUBCUTANEOUS at 16:55

## 2019-10-11 RX ADMIN — INSULIN LISPRO 9 UNITS: 100 INJECTION, SOLUTION INTRAVENOUS; SUBCUTANEOUS at 05:07

## 2019-10-11 RX ADMIN — ALPRAZOLAM 0.25 MG: 0.25 TABLET ORAL at 22:28

## 2019-10-11 RX ADMIN — ASPIRIN 81 MG 81 MG: 81 TABLET ORAL at 08:56

## 2019-10-11 RX ADMIN — Medication 10 ML: at 09:01

## 2019-10-11 RX ADMIN — METOPROLOL TARTRATE 25 MG: 25 TABLET ORAL at 08:57

## 2019-10-11 RX ADMIN — INSULIN LISPRO 5 UNITS: 100 INJECTION, SOLUTION INTRAVENOUS; SUBCUTANEOUS at 11:57

## 2019-10-11 RX ADMIN — DARBEPOETIN ALFA 100 MCG: 100 SOLUTION INTRAVENOUS; SUBCUTANEOUS at 15:59

## 2019-10-11 RX ADMIN — LISINOPRIL 5 MG: 5 TABLET ORAL at 16:57

## 2019-10-11 RX ADMIN — ROSUVASTATIN CALCIUM 10 MG: 10 TABLET, FILM COATED ORAL at 21:31

## 2019-10-11 ASSESSMENT — PAIN SCALES - GENERAL
PAINLEVEL_OUTOF10: 0
PAINLEVEL_OUTOF10: 0

## 2019-10-12 LAB
ALBUMIN SERPL-MCNC: 2.9 G/DL (ref 3.4–5)
ANION GAP SERPL CALCULATED.3IONS-SCNC: 12 MMOL/L (ref 3–16)
BASOPHILS ABSOLUTE: 0 K/UL (ref 0–0.2)
BASOPHILS RELATIVE PERCENT: 0.5 %
BUN BLDV-MCNC: 32 MG/DL (ref 7–20)
CALCIUM SERPL-MCNC: 8.6 MG/DL (ref 8.3–10.6)
CHLORIDE BLD-SCNC: 113 MMOL/L (ref 99–110)
CO2: 21 MMOL/L (ref 21–32)
CREAT SERPL-MCNC: 1 MG/DL (ref 0.6–1.2)
EOSINOPHILS ABSOLUTE: 0.1 K/UL (ref 0–0.6)
EOSINOPHILS RELATIVE PERCENT: 1.5 %
GFR AFRICAN AMERICAN: >60
GFR NON-AFRICAN AMERICAN: 55
GLUCOSE BLD-MCNC: 158 MG/DL (ref 70–99)
GLUCOSE BLD-MCNC: 174 MG/DL (ref 70–99)
GLUCOSE BLD-MCNC: 204 MG/DL (ref 70–99)
GLUCOSE BLD-MCNC: 271 MG/DL (ref 70–99)
GLUCOSE BLD-MCNC: 399 MG/DL (ref 70–99)
HCT VFR BLD CALC: 26.4 % (ref 36–48)
HCT VFR BLD CALC: 26.4 % (ref 36–48)
HEMOGLOBIN: 8.9 G/DL (ref 12–16)
IMMATURE RETIC FRACT: 0.63 (ref 0.21–0.37)
LYMPHOCYTES ABSOLUTE: 1.6 K/UL (ref 1–5.1)
LYMPHOCYTES RELATIVE PERCENT: 27.7 %
MCH RBC QN AUTO: 32 PG (ref 26–34)
MCHC RBC AUTO-ENTMCNC: 33.9 G/DL (ref 31–36)
MCV RBC AUTO: 94.5 FL (ref 80–100)
MONOCYTES ABSOLUTE: 0.6 K/UL (ref 0–1.3)
MONOCYTES RELATIVE PERCENT: 11.1 %
NEUTROPHILS ABSOLUTE: 3.4 K/UL (ref 1.7–7.7)
NEUTROPHILS RELATIVE PERCENT: 59.2 %
PDW BLD-RTO: 13.4 % (ref 12.4–15.4)
PERFORMED ON: ABNORMAL
PHOSPHORUS: 2.9 MG/DL (ref 2.5–4.9)
PLATELET # BLD: 131 K/UL (ref 135–450)
PMV BLD AUTO: 10.7 FL (ref 5–10.5)
POTASSIUM SERPL-SCNC: 3.8 MMOL/L (ref 3.5–5.1)
RBC # BLD: 2.79 M/UL (ref 4–5.2)
RETICULOCYTE ABSOLUTE COUNT: 0.11 M/UL (ref 0.02–0.1)
RETICULOCYTE COUNT PCT: 4.09 % (ref 0.5–2.18)
SODIUM BLD-SCNC: 146 MMOL/L (ref 136–145)
WBC # BLD: 5.7 K/UL (ref 4–11)

## 2019-10-12 PROCEDURE — 94760 N-INVAS EAR/PLS OXIMETRY 1: CPT

## 2019-10-12 PROCEDURE — C9113 INJ PANTOPRAZOLE SODIUM, VIA: HCPCS | Performed by: INTERNAL MEDICINE

## 2019-10-12 PROCEDURE — 6370000000 HC RX 637 (ALT 250 FOR IP): Performed by: INTERNAL MEDICINE

## 2019-10-12 PROCEDURE — 85025 COMPLETE CBC W/AUTO DIFF WBC: CPT

## 2019-10-12 PROCEDURE — 2580000003 HC RX 258: Performed by: INTERNAL MEDICINE

## 2019-10-12 PROCEDURE — 6370000000 HC RX 637 (ALT 250 FOR IP): Performed by: NURSE PRACTITIONER

## 2019-10-12 PROCEDURE — 6360000002 HC RX W HCPCS: Performed by: INTERNAL MEDICINE

## 2019-10-12 PROCEDURE — 97530 THERAPEUTIC ACTIVITIES: CPT

## 2019-10-12 PROCEDURE — 2060000000 HC ICU INTERMEDIATE R&B

## 2019-10-12 PROCEDURE — 97162 PT EVAL MOD COMPLEX 30 MIN: CPT

## 2019-10-12 PROCEDURE — 36415 COLL VENOUS BLD VENIPUNCTURE: CPT

## 2019-10-12 PROCEDURE — 80069 RENAL FUNCTION PANEL: CPT

## 2019-10-12 PROCEDURE — 97116 GAIT TRAINING THERAPY: CPT

## 2019-10-12 PROCEDURE — 97166 OT EVAL MOD COMPLEX 45 MIN: CPT

## 2019-10-12 PROCEDURE — 85045 AUTOMATED RETICULOCYTE COUNT: CPT

## 2019-10-12 PROCEDURE — 99233 SBSQ HOSP IP/OBS HIGH 50: CPT | Performed by: INTERNAL MEDICINE

## 2019-10-12 RX ORDER — METOPROLOL SUCCINATE 50 MG/1
50 TABLET, EXTENDED RELEASE ORAL DAILY
Status: DISCONTINUED | OUTPATIENT
Start: 2019-10-12 | End: 2019-10-15 | Stop reason: HOSPADM

## 2019-10-12 RX ORDER — FUROSEMIDE 10 MG/ML
40 INJECTION INTRAMUSCULAR; INTRAVENOUS ONCE
Status: COMPLETED | OUTPATIENT
Start: 2019-10-12 | End: 2019-10-12

## 2019-10-12 RX ADMIN — ALPRAZOLAM 0.25 MG: 0.25 TABLET ORAL at 22:11

## 2019-10-12 RX ADMIN — ASPIRIN 81 MG 81 MG: 81 TABLET ORAL at 11:18

## 2019-10-12 RX ADMIN — Medication 10 ML: at 09:41

## 2019-10-12 RX ADMIN — PANTOPRAZOLE SODIUM 40 MG: 40 INJECTION, POWDER, FOR SOLUTION INTRAVENOUS at 09:39

## 2019-10-12 RX ADMIN — Medication 10 ML: at 22:12

## 2019-10-12 RX ADMIN — PANTOPRAZOLE SODIUM 40 MG: 40 INJECTION, POWDER, FOR SOLUTION INTRAVENOUS at 22:11

## 2019-10-12 RX ADMIN — METOPROLOL SUCCINATE 25 MG: 25 TABLET, EXTENDED RELEASE ORAL at 09:39

## 2019-10-12 RX ADMIN — INSULIN LISPRO 1 UNITS: 100 INJECTION, SOLUTION INTRAVENOUS; SUBCUTANEOUS at 12:23

## 2019-10-12 RX ADMIN — LISINOPRIL 5 MG: 5 TABLET ORAL at 09:39

## 2019-10-12 RX ADMIN — INSULIN LISPRO 5 UNITS: 100 INJECTION, SOLUTION INTRAVENOUS; SUBCUTANEOUS at 17:40

## 2019-10-12 RX ADMIN — CLOPIDOGREL BISULFATE 75 MG: 75 TABLET ORAL at 11:18

## 2019-10-12 RX ADMIN — FUROSEMIDE 40 MG: 10 INJECTION, SOLUTION INTRAMUSCULAR; INTRAVENOUS at 18:11

## 2019-10-12 RX ADMIN — ROSUVASTATIN CALCIUM 40 MG: 10 TABLET, FILM COATED ORAL at 22:19

## 2019-10-12 RX ADMIN — INSULIN LISPRO 8 UNITS: 100 INJECTION, SOLUTION INTRAVENOUS; SUBCUTANEOUS at 17:43

## 2019-10-12 RX ADMIN — METOPROLOL SUCCINATE 50 MG: 50 TABLET, EXTENDED RELEASE ORAL at 18:28

## 2019-10-12 RX ADMIN — ENOXAPARIN SODIUM 40 MG: 40 INJECTION SUBCUTANEOUS at 11:18

## 2019-10-12 RX ADMIN — NITROGLYCERIN 1 INCH: 20 OINTMENT TOPICAL at 18:28

## 2019-10-12 RX ADMIN — INSULIN LISPRO 2 UNITS: 100 INJECTION, SOLUTION INTRAVENOUS; SUBCUTANEOUS at 22:12

## 2019-10-12 RX ADMIN — Medication 10 ML: at 11:19

## 2019-10-12 ASSESSMENT — PAIN SCALES - GENERAL
PAINLEVEL_OUTOF10: 0

## 2019-10-12 ASSESSMENT — ENCOUNTER SYMPTOMS
COUGH: 1
ALLERGIC/IMMUNOLOGIC NEGATIVE: 1
SHORTNESS OF BREATH: 1
EYES NEGATIVE: 1
GASTROINTESTINAL NEGATIVE: 1

## 2019-10-13 LAB
ALBUMIN SERPL-MCNC: 2.7 G/DL (ref 3.4–5)
ANION GAP SERPL CALCULATED.3IONS-SCNC: 13 MMOL/L (ref 3–16)
BASOPHILS ABSOLUTE: 0 K/UL (ref 0–0.2)
BASOPHILS RELATIVE PERCENT: 0.4 %
BUN BLDV-MCNC: 25 MG/DL (ref 7–20)
CALCIUM SERPL-MCNC: 8.3 MG/DL (ref 8.3–10.6)
CHLORIDE BLD-SCNC: 109 MMOL/L (ref 99–110)
CO2: 21 MMOL/L (ref 21–32)
CREAT SERPL-MCNC: 0.9 MG/DL (ref 0.6–1.2)
EOSINOPHILS ABSOLUTE: 0.1 K/UL (ref 0–0.6)
EOSINOPHILS RELATIVE PERCENT: 1.2 %
GFR AFRICAN AMERICAN: >60
GFR NON-AFRICAN AMERICAN: >60
GLUCOSE BLD-MCNC: 103 MG/DL (ref 70–99)
GLUCOSE BLD-MCNC: 172 MG/DL (ref 70–99)
GLUCOSE BLD-MCNC: 230 MG/DL (ref 70–99)
GLUCOSE BLD-MCNC: 280 MG/DL (ref 70–99)
GLUCOSE BLD-MCNC: 288 MG/DL (ref 70–99)
HCT VFR BLD CALC: 25.8 % (ref 36–48)
HEMOGLOBIN: 8.9 G/DL (ref 12–16)
LYMPHOCYTES ABSOLUTE: 1.4 K/UL (ref 1–5.1)
LYMPHOCYTES RELATIVE PERCENT: 27.2 %
MCH RBC QN AUTO: 32.3 PG (ref 26–34)
MCHC RBC AUTO-ENTMCNC: 34.6 G/DL (ref 31–36)
MCV RBC AUTO: 93.3 FL (ref 80–100)
MONOCYTES ABSOLUTE: 0.7 K/UL (ref 0–1.3)
MONOCYTES RELATIVE PERCENT: 13.3 %
NEUTROPHILS ABSOLUTE: 3 K/UL (ref 1.7–7.7)
NEUTROPHILS RELATIVE PERCENT: 57.9 %
PDW BLD-RTO: 13.4 % (ref 12.4–15.4)
PERFORMED ON: ABNORMAL
PHOSPHORUS: 3.1 MG/DL (ref 2.5–4.9)
PLATELET # BLD: 118 K/UL (ref 135–450)
PMV BLD AUTO: 11.3 FL (ref 5–10.5)
POTASSIUM SERPL-SCNC: 3.3 MMOL/L (ref 3.5–5.1)
RBC # BLD: 2.76 M/UL (ref 4–5.2)
SODIUM BLD-SCNC: 143 MMOL/L (ref 136–145)
WBC # BLD: 5.2 K/UL (ref 4–11)

## 2019-10-13 PROCEDURE — 85025 COMPLETE CBC W/AUTO DIFF WBC: CPT

## 2019-10-13 PROCEDURE — 2060000000 HC ICU INTERMEDIATE R&B

## 2019-10-13 PROCEDURE — 6370000000 HC RX 637 (ALT 250 FOR IP): Performed by: INTERNAL MEDICINE

## 2019-10-13 PROCEDURE — 36415 COLL VENOUS BLD VENIPUNCTURE: CPT

## 2019-10-13 PROCEDURE — C9113 INJ PANTOPRAZOLE SODIUM, VIA: HCPCS | Performed by: INTERNAL MEDICINE

## 2019-10-13 PROCEDURE — 80069 RENAL FUNCTION PANEL: CPT

## 2019-10-13 PROCEDURE — 94760 N-INVAS EAR/PLS OXIMETRY 1: CPT

## 2019-10-13 PROCEDURE — 6360000002 HC RX W HCPCS: Performed by: INTERNAL MEDICINE

## 2019-10-13 PROCEDURE — 6370000000 HC RX 637 (ALT 250 FOR IP): Performed by: NURSE PRACTITIONER

## 2019-10-13 PROCEDURE — 99233 SBSQ HOSP IP/OBS HIGH 50: CPT | Performed by: INTERNAL MEDICINE

## 2019-10-13 PROCEDURE — 2580000003 HC RX 258: Performed by: INTERNAL MEDICINE

## 2019-10-13 RX ADMIN — INSULIN LISPRO 2 UNITS: 100 INJECTION, SOLUTION INTRAVENOUS; SUBCUTANEOUS at 21:16

## 2019-10-13 RX ADMIN — LISINOPRIL 5 MG: 5 TABLET ORAL at 09:02

## 2019-10-13 RX ADMIN — METOPROLOL SUCCINATE 50 MG: 50 TABLET, EXTENDED RELEASE ORAL at 09:02

## 2019-10-13 RX ADMIN — INSULIN GLARGINE 20 UNITS: 100 INJECTION, SOLUTION SUBCUTANEOUS at 06:14

## 2019-10-13 RX ADMIN — Medication 10 ML: at 21:01

## 2019-10-13 RX ADMIN — NITROGLYCERIN 1 INCH: 20 OINTMENT TOPICAL at 06:14

## 2019-10-13 RX ADMIN — Medication 10 ML: at 21:00

## 2019-10-13 RX ADMIN — NITROGLYCERIN 1 INCH: 20 OINTMENT TOPICAL at 00:15

## 2019-10-13 RX ADMIN — PANTOPRAZOLE SODIUM 40 MG: 40 INJECTION, POWDER, FOR SOLUTION INTRAVENOUS at 09:02

## 2019-10-13 RX ADMIN — ALPRAZOLAM 0.25 MG: 0.25 TABLET ORAL at 21:00

## 2019-10-13 RX ADMIN — ENOXAPARIN SODIUM 40 MG: 40 INJECTION SUBCUTANEOUS at 09:02

## 2019-10-13 RX ADMIN — ROSUVASTATIN CALCIUM 40 MG: 10 TABLET, FILM COATED ORAL at 21:00

## 2019-10-13 RX ADMIN — NITROGLYCERIN 1 INCH: 20 OINTMENT TOPICAL at 11:44

## 2019-10-13 RX ADMIN — Medication 10 ML: at 10:10

## 2019-10-13 RX ADMIN — INSULIN LISPRO 2 UNITS: 100 INJECTION, SOLUTION INTRAVENOUS; SUBCUTANEOUS at 11:47

## 2019-10-13 RX ADMIN — INSULIN LISPRO 6 UNITS: 100 INJECTION, SOLUTION INTRAVENOUS; SUBCUTANEOUS at 17:38

## 2019-10-13 RX ADMIN — PANTOPRAZOLE SODIUM 40 MG: 40 INJECTION, POWDER, FOR SOLUTION INTRAVENOUS at 21:00

## 2019-10-13 RX ADMIN — NITROGLYCERIN 1 INCH: 20 OINTMENT TOPICAL at 19:47

## 2019-10-13 RX ADMIN — INSULIN LISPRO 12 UNITS: 100 INJECTION, SOLUTION INTRAVENOUS; SUBCUTANEOUS at 11:45

## 2019-10-13 RX ADMIN — CLOPIDOGREL BISULFATE 75 MG: 75 TABLET ORAL at 09:02

## 2019-10-13 RX ADMIN — ASPIRIN 81 MG 81 MG: 81 TABLET ORAL at 09:02

## 2019-10-13 RX ADMIN — Medication 10 ML: at 09:02

## 2019-10-13 RX ADMIN — INSULIN LISPRO 3 UNITS: 100 INJECTION, SOLUTION INTRAVENOUS; SUBCUTANEOUS at 09:03

## 2019-10-13 RX ADMIN — INSULIN LISPRO 12 UNITS: 100 INJECTION, SOLUTION INTRAVENOUS; SUBCUTANEOUS at 09:04

## 2019-10-13 ASSESSMENT — PAIN SCALES - GENERAL
PAINLEVEL_OUTOF10: 0

## 2019-10-14 ENCOUNTER — ANESTHESIA EVENT (OUTPATIENT)
Dept: ENDOSCOPY | Age: 69
DRG: 282 | End: 2019-10-14
Payer: MEDICARE

## 2019-10-14 ENCOUNTER — APPOINTMENT (OUTPATIENT)
Dept: CT IMAGING | Age: 69
DRG: 282 | End: 2019-10-14
Payer: MEDICARE

## 2019-10-14 ENCOUNTER — ANESTHESIA (OUTPATIENT)
Dept: ENDOSCOPY | Age: 69
DRG: 282 | End: 2019-10-14
Payer: MEDICARE

## 2019-10-14 VITALS
SYSTOLIC BLOOD PRESSURE: 131 MMHG | OXYGEN SATURATION: 100 % | DIASTOLIC BLOOD PRESSURE: 80 MMHG | RESPIRATION RATE: 22 BRPM

## 2019-10-14 LAB
ALBUMIN SERPL-MCNC: 2.3 G/DL (ref 3.1–4.9)
ALBUMIN SERPL-MCNC: 2.9 G/DL (ref 3.4–5)
ALPHA-1-GLOBULIN: 0.2 G/DL (ref 0.2–0.4)
ALPHA-2-GLOBULIN: 0.9 G/DL (ref 0.4–1.1)
ANION GAP SERPL CALCULATED.3IONS-SCNC: 11 MMOL/L (ref 3–16)
BASOPHILS ABSOLUTE: 0 K/UL (ref 0–0.2)
BASOPHILS ABSOLUTE: 0 K/UL (ref 0–0.2)
BASOPHILS RELATIVE PERCENT: 0.4 %
BASOPHILS RELATIVE PERCENT: 0.5 %
BETA GLOBULIN: 0.7 G/DL (ref 0.9–1.6)
BUN BLDV-MCNC: 22 MG/DL (ref 7–20)
CALCIUM SERPL-MCNC: 8.4 MG/DL (ref 8.3–10.6)
CHLORIDE BLD-SCNC: 110 MMOL/L (ref 99–110)
CO2: 22 MMOL/L (ref 21–32)
CREAT SERPL-MCNC: 0.8 MG/DL (ref 0.6–1.2)
EOSINOPHILS ABSOLUTE: 0.1 K/UL (ref 0–0.6)
EOSINOPHILS ABSOLUTE: 0.1 K/UL (ref 0–0.6)
EOSINOPHILS RELATIVE PERCENT: 1.1 %
EOSINOPHILS RELATIVE PERCENT: 1.1 %
GAMMA GLOBULIN: 0.3 G/DL (ref 0.6–1.8)
GFR AFRICAN AMERICAN: >60
GFR NON-AFRICAN AMERICAN: >60
GLUCOSE BLD-MCNC: 150 MG/DL (ref 70–99)
GLUCOSE BLD-MCNC: 200 MG/DL (ref 70–99)
GLUCOSE BLD-MCNC: 216 MG/DL (ref 70–99)
GLUCOSE BLD-MCNC: 54 MG/DL (ref 70–99)
GLUCOSE BLD-MCNC: 78 MG/DL (ref 70–99)
GLUCOSE BLD-MCNC: 87 MG/DL (ref 70–99)
GLUCOSE BLD-MCNC: 88 MG/DL (ref 70–99)
GLUCOSE BLD-MCNC: 92 MG/DL (ref 70–99)
HCT VFR BLD CALC: 27.4 % (ref 36–48)
HCT VFR BLD CALC: 29.5 % (ref 36–48)
HEMOGLOBIN: 10.1 G/DL (ref 12–16)
HEMOGLOBIN: 9.4 G/DL (ref 12–16)
IMMATURE RETIC FRACT: 0.58 (ref 0.21–0.37)
LYMPHOCYTES ABSOLUTE: 1.1 K/UL (ref 1–5.1)
LYMPHOCYTES ABSOLUTE: 1.5 K/UL (ref 1–5.1)
LYMPHOCYTES RELATIVE PERCENT: 19.2 %
LYMPHOCYTES RELATIVE PERCENT: 27.2 %
MCH RBC QN AUTO: 32.2 PG (ref 26–34)
MCH RBC QN AUTO: 32.2 PG (ref 26–34)
MCHC RBC AUTO-ENTMCNC: 34.3 G/DL (ref 31–36)
MCHC RBC AUTO-ENTMCNC: 34.4 G/DL (ref 31–36)
MCV RBC AUTO: 93.6 FL (ref 80–100)
MCV RBC AUTO: 93.9 FL (ref 80–100)
MONOCYTES ABSOLUTE: 0.5 K/UL (ref 0–1.3)
MONOCYTES ABSOLUTE: 0.6 K/UL (ref 0–1.3)
MONOCYTES RELATIVE PERCENT: 12 %
MONOCYTES RELATIVE PERCENT: 9.3 %
NEUTROPHILS ABSOLUTE: 3.2 K/UL (ref 1.7–7.7)
NEUTROPHILS ABSOLUTE: 3.9 K/UL (ref 1.7–7.7)
NEUTROPHILS RELATIVE PERCENT: 59.2 %
NEUTROPHILS RELATIVE PERCENT: 70 %
PDW BLD-RTO: 13.1 % (ref 12.4–15.4)
PDW BLD-RTO: 13.3 % (ref 12.4–15.4)
PERFORMED ON: ABNORMAL
PERFORMED ON: NORMAL
PHOSPHORUS: 3.5 MG/DL (ref 2.5–4.9)
PLATELET # BLD: 124 K/UL (ref 135–450)
PLATELET # BLD: 139 K/UL (ref 135–450)
PMV BLD AUTO: 10.9 FL (ref 5–10.5)
PMV BLD AUTO: 10.9 FL (ref 5–10.5)
POTASSIUM SERPL-SCNC: 3.4 MMOL/L (ref 3.5–5.1)
RBC # BLD: 2.93 M/UL (ref 4–5.2)
RBC # BLD: 3.14 M/UL (ref 4–5.2)
RETICULOCYTE COUNT PCT: 5.15 % (ref 0.5–2.18)
SODIUM BLD-SCNC: 143 MMOL/L (ref 136–145)
SPE/IFE INTERPRETATION: NORMAL
TOTAL PROTEIN: 4.4 G/DL (ref 6.4–8.2)
WBC # BLD: 5.3 K/UL (ref 4–11)
WBC # BLD: 5.6 K/UL (ref 4–11)

## 2019-10-14 PROCEDURE — C9113 INJ PANTOPRAZOLE SODIUM, VIA: HCPCS | Performed by: INTERNAL MEDICINE

## 2019-10-14 PROCEDURE — 6370000000 HC RX 637 (ALT 250 FOR IP): Performed by: INTERNAL MEDICINE

## 2019-10-14 PROCEDURE — 6370000000 HC RX 637 (ALT 250 FOR IP): Performed by: NURSE PRACTITIONER

## 2019-10-14 PROCEDURE — 6360000002 HC RX W HCPCS: Performed by: RADIOLOGY

## 2019-10-14 PROCEDURE — 88185 FLOWCYTOMETRY/TC ADD-ON: CPT

## 2019-10-14 PROCEDURE — 07DR3ZX EXTRACTION OF ILIAC BONE MARROW, PERCUTANEOUS APPROACH, DIAGNOSTIC: ICD-10-PCS | Performed by: RADIOLOGY

## 2019-10-14 PROCEDURE — 2580000003 HC RX 258: Performed by: INTERNAL MEDICINE

## 2019-10-14 PROCEDURE — 85025 COMPLETE CBC W/AUTO DIFF WBC: CPT

## 2019-10-14 PROCEDURE — 6360000002 HC RX W HCPCS: Performed by: INTERNAL MEDICINE

## 2019-10-14 PROCEDURE — 77012 CT SCAN FOR NEEDLE BIOPSY: CPT

## 2019-10-14 PROCEDURE — 0DJ08ZZ INSPECTION OF UPPER INTESTINAL TRACT, VIA NATURAL OR ARTIFICIAL OPENING ENDOSCOPIC: ICD-10-PCS | Performed by: INTERNAL MEDICINE

## 2019-10-14 PROCEDURE — 3700000001 HC ADD 15 MINUTES (ANESTHESIA): Performed by: INTERNAL MEDICINE

## 2019-10-14 PROCEDURE — 88311 DECALCIFY TISSUE: CPT

## 2019-10-14 PROCEDURE — 88305 TISSUE EXAM BY PATHOLOGIST: CPT

## 2019-10-14 PROCEDURE — 88313 SPECIAL STAINS GROUP 2: CPT

## 2019-10-14 PROCEDURE — 3700000000 HC ANESTHESIA ATTENDED CARE: Performed by: INTERNAL MEDICINE

## 2019-10-14 PROCEDURE — 99222 1ST HOSP IP/OBS MODERATE 55: CPT | Performed by: NURSE PRACTITIONER

## 2019-10-14 PROCEDURE — 2500000003 HC RX 250 WO HCPCS: Performed by: NURSE ANESTHETIST, CERTIFIED REGISTERED

## 2019-10-14 PROCEDURE — 7100000000 HC PACU RECOVERY - FIRST 15 MIN: Performed by: INTERNAL MEDICINE

## 2019-10-14 PROCEDURE — 97530 THERAPEUTIC ACTIVITIES: CPT

## 2019-10-14 PROCEDURE — C1830 POWER BONE MARROW BX NEEDLE: HCPCS

## 2019-10-14 PROCEDURE — 88184 FLOWCYTOMETRY/ TC 1 MARKER: CPT

## 2019-10-14 PROCEDURE — 2709999900 HC NON-CHARGEABLE SUPPLY: Performed by: INTERNAL MEDICINE

## 2019-10-14 PROCEDURE — 3609017100 HC EGD: Performed by: INTERNAL MEDICINE

## 2019-10-14 PROCEDURE — 80069 RENAL FUNCTION PANEL: CPT

## 2019-10-14 PROCEDURE — 7100000001 HC PACU RECOVERY - ADDTL 15 MIN: Performed by: INTERNAL MEDICINE

## 2019-10-14 PROCEDURE — 1200000000 HC SEMI PRIVATE

## 2019-10-14 PROCEDURE — 2060000000 HC ICU INTERMEDIATE R&B

## 2019-10-14 PROCEDURE — 85045 AUTOMATED RETICULOCYTE COUNT: CPT

## 2019-10-14 PROCEDURE — 94760 N-INVAS EAR/PLS OXIMETRY 1: CPT

## 2019-10-14 PROCEDURE — 6360000002 HC RX W HCPCS: Performed by: NURSE ANESTHETIST, CERTIFIED REGISTERED

## 2019-10-14 PROCEDURE — 2580000003 HC RX 258: Performed by: ANESTHESIOLOGY

## 2019-10-14 PROCEDURE — 36415 COLL VENOUS BLD VENIPUNCTURE: CPT

## 2019-10-14 RX ORDER — LIDOCAINE HYDROCHLORIDE 20 MG/ML
INJECTION, SOLUTION EPIDURAL; INFILTRATION; INTRACAUDAL; PERINEURAL PRN
Status: DISCONTINUED | OUTPATIENT
Start: 2019-10-14 | End: 2019-10-14 | Stop reason: SDUPTHER

## 2019-10-14 RX ORDER — PROPOFOL 10 MG/ML
INJECTION, EMULSION INTRAVENOUS PRN
Status: DISCONTINUED | OUTPATIENT
Start: 2019-10-14 | End: 2019-10-14 | Stop reason: SDUPTHER

## 2019-10-14 RX ORDER — FENTANYL CITRATE 50 UG/ML
INJECTION, SOLUTION INTRAMUSCULAR; INTRAVENOUS DAILY PRN
Status: COMPLETED | OUTPATIENT
Start: 2019-10-14 | End: 2019-10-14

## 2019-10-14 RX ORDER — EPHEDRINE SULFATE 50 MG/ML
INJECTION INTRAVENOUS PRN
Status: DISCONTINUED | OUTPATIENT
Start: 2019-10-14 | End: 2019-10-14 | Stop reason: SDUPTHER

## 2019-10-14 RX ORDER — SODIUM CHLORIDE 9 MG/ML
INJECTION, SOLUTION INTRAVENOUS CONTINUOUS
Status: DISCONTINUED | OUTPATIENT
Start: 2019-10-14 | End: 2019-10-14

## 2019-10-14 RX ORDER — ONDANSETRON 2 MG/ML
4 INJECTION INTRAMUSCULAR; INTRAVENOUS
Status: DISCONTINUED | OUTPATIENT
Start: 2019-10-14 | End: 2019-10-14

## 2019-10-14 RX ORDER — SODIUM CHLORIDE 0.9 % (FLUSH) 0.9 %
10 SYRINGE (ML) INJECTION PRN
Status: DISCONTINUED | OUTPATIENT
Start: 2019-10-14 | End: 2019-10-14 | Stop reason: SDUPTHER

## 2019-10-14 RX ORDER — MIDAZOLAM HYDROCHLORIDE 1 MG/ML
INJECTION INTRAMUSCULAR; INTRAVENOUS DAILY PRN
Status: COMPLETED | OUTPATIENT
Start: 2019-10-14 | End: 2019-10-14

## 2019-10-14 RX ORDER — SODIUM CHLORIDE 0.9 % (FLUSH) 0.9 %
10 SYRINGE (ML) INJECTION EVERY 12 HOURS SCHEDULED
Status: DISCONTINUED | OUTPATIENT
Start: 2019-10-14 | End: 2019-10-14 | Stop reason: SDUPTHER

## 2019-10-14 RX ORDER — LISINOPRIL 20 MG/1
20 TABLET ORAL ONCE
Status: COMPLETED | OUTPATIENT
Start: 2019-10-14 | End: 2019-10-14

## 2019-10-14 RX ADMIN — EPHEDRINE SULFATE 15 MG: 50 INJECTION INTRAVENOUS at 15:01

## 2019-10-14 RX ADMIN — FENTANYL CITRATE 25 MCG: 50 INJECTION INTRAMUSCULAR; INTRAVENOUS at 09:32

## 2019-10-14 RX ADMIN — PROPOFOL 20 MG: 10 INJECTION, EMULSION INTRAVENOUS at 14:56

## 2019-10-14 RX ADMIN — LIDOCAINE HYDROCHLORIDE 40 MG: 20 INJECTION, SOLUTION EPIDURAL; INFILTRATION; INTRACAUDAL; PERINEURAL at 14:51

## 2019-10-14 RX ADMIN — Medication 10 ML: at 09:05

## 2019-10-14 RX ADMIN — LISINOPRIL 20 MG: 20 TABLET ORAL at 17:57

## 2019-10-14 RX ADMIN — SODIUM CHLORIDE: 9 INJECTION, SOLUTION INTRAVENOUS at 14:22

## 2019-10-14 RX ADMIN — PANTOPRAZOLE SODIUM 40 MG: 40 INJECTION, POWDER, FOR SOLUTION INTRAVENOUS at 21:14

## 2019-10-14 RX ADMIN — METOPROLOL SUCCINATE 50 MG: 50 TABLET, EXTENDED RELEASE ORAL at 08:57

## 2019-10-14 RX ADMIN — EPHEDRINE SULFATE 10 MG: 50 INJECTION INTRAVENOUS at 15:03

## 2019-10-14 RX ADMIN — NITROGLYCERIN 1 INCH: 20 OINTMENT TOPICAL at 00:50

## 2019-10-14 RX ADMIN — NITROGLYCERIN 1 INCH: 20 OINTMENT TOPICAL at 17:57

## 2019-10-14 RX ADMIN — MIDAZOLAM 0.5 MG: 1 INJECTION INTRAMUSCULAR; INTRAVENOUS at 09:29

## 2019-10-14 RX ADMIN — FENTANYL CITRATE 25 MCG: 50 INJECTION INTRAMUSCULAR; INTRAVENOUS at 09:28

## 2019-10-14 RX ADMIN — CLOPIDOGREL BISULFATE 75 MG: 75 TABLET ORAL at 15:54

## 2019-10-14 RX ADMIN — PROPOFOL 80 MG: 10 INJECTION, EMULSION INTRAVENOUS at 14:51

## 2019-10-14 RX ADMIN — LIDOCAINE HYDROCHLORIDE 10 MG: 20 INJECTION, SOLUTION EPIDURAL; INFILTRATION; INTRACAUDAL; PERINEURAL at 14:56

## 2019-10-14 RX ADMIN — ASPIRIN 81 MG 81 MG: 81 TABLET ORAL at 15:54

## 2019-10-14 RX ADMIN — PANTOPRAZOLE SODIUM 40 MG: 40 INJECTION, POWDER, FOR SOLUTION INTRAVENOUS at 08:56

## 2019-10-14 RX ADMIN — LISINOPRIL 5 MG: 5 TABLET ORAL at 08:57

## 2019-10-14 RX ADMIN — Medication 10 ML: at 21:17

## 2019-10-14 RX ADMIN — INSULIN LISPRO 12 UNITS: 100 INJECTION, SOLUTION INTRAVENOUS; SUBCUTANEOUS at 16:57

## 2019-10-14 RX ADMIN — PROPOFOL 40 MG: 10 INJECTION, EMULSION INTRAVENOUS at 14:54

## 2019-10-14 RX ADMIN — INSULIN LISPRO 2 UNITS: 100 INJECTION, SOLUTION INTRAVENOUS; SUBCUTANEOUS at 12:10

## 2019-10-14 RX ADMIN — LIDOCAINE HYDROCHLORIDE 20 MG: 20 INJECTION, SOLUTION EPIDURAL; INFILTRATION; INTRACAUDAL; PERINEURAL at 14:54

## 2019-10-14 RX ADMIN — MIDAZOLAM 0.5 MG: 1 INJECTION INTRAMUSCULAR; INTRAVENOUS at 09:32

## 2019-10-14 RX ADMIN — INSULIN LISPRO 4 UNITS: 100 INJECTION, SOLUTION INTRAVENOUS; SUBCUTANEOUS at 09:05

## 2019-10-14 RX ADMIN — ROSUVASTATIN CALCIUM 40 MG: 10 TABLET, FILM COATED ORAL at 21:14

## 2019-10-14 RX ADMIN — NITROGLYCERIN 1 INCH: 20 OINTMENT TOPICAL at 12:10

## 2019-10-14 RX ADMIN — NITROGLYCERIN 1 INCH: 20 OINTMENT TOPICAL at 06:20

## 2019-10-14 RX ADMIN — INSULIN GLARGINE 20 UNITS: 100 INJECTION, SOLUTION SUBCUTANEOUS at 07:41

## 2019-10-14 RX ADMIN — Medication 10 ML: at 08:56

## 2019-10-14 RX ADMIN — Medication 10 ML: at 21:14

## 2019-10-14 ASSESSMENT — PAIN DESCRIPTION - LOCATION: LOCATION: FLANK

## 2019-10-14 ASSESSMENT — ENCOUNTER SYMPTOMS: SHORTNESS OF BREATH: 1

## 2019-10-14 ASSESSMENT — PAIN SCALES - GENERAL
PAINLEVEL_OUTOF10: 0
PAINLEVEL_OUTOF10: 1
PAINLEVEL_OUTOF10: 0

## 2019-10-14 ASSESSMENT — PULMONARY FUNCTION TESTS
PIF_VALUE: 0

## 2019-10-14 ASSESSMENT — PAIN - FUNCTIONAL ASSESSMENT: PAIN_FUNCTIONAL_ASSESSMENT: 0-10

## 2019-10-14 ASSESSMENT — PAIN DESCRIPTION - DESCRIPTORS: DESCRIPTORS: DISCOMFORT

## 2019-10-14 ASSESSMENT — PAIN DESCRIPTION - ORIENTATION: ORIENTATION: RIGHT

## 2019-10-14 ASSESSMENT — PAIN DESCRIPTION - PROGRESSION: CLINICAL_PROGRESSION: NOT CHANGED

## 2019-10-14 ASSESSMENT — PAIN DESCRIPTION - FREQUENCY: FREQUENCY: CONTINUOUS

## 2019-10-14 ASSESSMENT — PAIN DESCRIPTION - ONSET: ONSET: OTHER (COMMENT)

## 2019-10-15 VITALS
OXYGEN SATURATION: 100 % | WEIGHT: 110.23 LBS | RESPIRATION RATE: 16 BRPM | TEMPERATURE: 97.6 F | DIASTOLIC BLOOD PRESSURE: 56 MMHG | SYSTOLIC BLOOD PRESSURE: 152 MMHG | BODY MASS INDEX: 20.81 KG/M2 | HEART RATE: 67 BPM | HEIGHT: 61 IN

## 2019-10-15 LAB
ALBUMIN SERPL-MCNC: 2.2 G/DL (ref 3.4–5)
ANION GAP SERPL CALCULATED.3IONS-SCNC: 12 MMOL/L (ref 3–16)
BASOPHILS ABSOLUTE: 0 K/UL (ref 0–0.2)
BASOPHILS RELATIVE PERCENT: 0.6 %
BUN BLDV-MCNC: 15 MG/DL (ref 7–20)
CALCIUM SERPL-MCNC: 7.9 MG/DL (ref 8.3–10.6)
CHLORIDE BLD-SCNC: 107 MMOL/L (ref 99–110)
CHOLESTEROL, TOTAL: 201 MG/DL (ref 0–199)
CO2: 23 MMOL/L (ref 21–32)
CREAT SERPL-MCNC: 0.8 MG/DL (ref 0.6–1.2)
EOSINOPHILS ABSOLUTE: 0.1 K/UL (ref 0–0.6)
EOSINOPHILS RELATIVE PERCENT: 1.3 %
GFR AFRICAN AMERICAN: >60
GFR NON-AFRICAN AMERICAN: >60
GLUCOSE BLD-MCNC: 197 MG/DL (ref 70–99)
GLUCOSE BLD-MCNC: 214 MG/DL (ref 70–99)
GLUCOSE BLD-MCNC: 239 MG/DL (ref 70–99)
GLUCOSE BLD-MCNC: 240 MG/DL (ref 70–99)
GLUCOSE BLD-MCNC: 246 MG/DL (ref 70–99)
HCT VFR BLD CALC: 25.8 % (ref 36–48)
HDLC SERPL-MCNC: 31 MG/DL (ref 40–60)
HEMOGLOBIN: 8.8 G/DL (ref 12–16)
LDL CHOLESTEROL CALCULATED: 145 MG/DL
LYMPHOCYTES ABSOLUTE: 1.2 K/UL (ref 1–5.1)
LYMPHOCYTES RELATIVE PERCENT: 29.6 %
MCH RBC QN AUTO: 32 PG (ref 26–34)
MCHC RBC AUTO-ENTMCNC: 34.2 G/DL (ref 31–36)
MCV RBC AUTO: 93.7 FL (ref 80–100)
MONOCYTES ABSOLUTE: 0.5 K/UL (ref 0–1.3)
MONOCYTES RELATIVE PERCENT: 13 %
NEUTROPHILS ABSOLUTE: 2.3 K/UL (ref 1.7–7.7)
NEUTROPHILS RELATIVE PERCENT: 55.5 %
PDW BLD-RTO: 13.3 % (ref 12.4–15.4)
PERFORMED ON: ABNORMAL
PHOSPHORUS: 4.1 MG/DL (ref 2.5–4.9)
PLATELET # BLD: 123 K/UL (ref 135–450)
PMV BLD AUTO: 10.9 FL (ref 5–10.5)
POTASSIUM SERPL-SCNC: 3.6 MMOL/L (ref 3.5–5.1)
RBC # BLD: 2.75 M/UL (ref 4–5.2)
SODIUM BLD-SCNC: 142 MMOL/L (ref 136–145)
TRIGL SERPL-MCNC: 125 MG/DL (ref 0–150)
VLDLC SERPL CALC-MCNC: 25 MG/DL
WBC # BLD: 4.1 K/UL (ref 4–11)

## 2019-10-15 PROCEDURE — 6370000000 HC RX 637 (ALT 250 FOR IP): Performed by: INTERNAL MEDICINE

## 2019-10-15 PROCEDURE — 6370000000 HC RX 637 (ALT 250 FOR IP): Performed by: NURSE PRACTITIONER

## 2019-10-15 PROCEDURE — 99232 SBSQ HOSP IP/OBS MODERATE 35: CPT | Performed by: NURSE PRACTITIONER

## 2019-10-15 PROCEDURE — 94760 N-INVAS EAR/PLS OXIMETRY 1: CPT

## 2019-10-15 PROCEDURE — 85025 COMPLETE CBC W/AUTO DIFF WBC: CPT

## 2019-10-15 PROCEDURE — 6360000002 HC RX W HCPCS: Performed by: INTERNAL MEDICINE

## 2019-10-15 PROCEDURE — 80069 RENAL FUNCTION PANEL: CPT

## 2019-10-15 PROCEDURE — 80061 LIPID PANEL: CPT

## 2019-10-15 PROCEDURE — 36415 COLL VENOUS BLD VENIPUNCTURE: CPT

## 2019-10-15 PROCEDURE — 97530 THERAPEUTIC ACTIVITIES: CPT

## 2019-10-15 RX ORDER — ISOSORBIDE MONONITRATE 30 MG/1
30 TABLET, EXTENDED RELEASE ORAL DAILY
Status: DISCONTINUED | OUTPATIENT
Start: 2019-10-15 | End: 2019-10-15 | Stop reason: HOSPADM

## 2019-10-15 RX ORDER — FERROUS SULFATE TAB EC 324 MG (65 MG FE EQUIVALENT) 324 (65 FE) MG
324 TABLET DELAYED RESPONSE ORAL
Status: DISCONTINUED | OUTPATIENT
Start: 2019-10-15 | End: 2019-10-15 | Stop reason: HOSPADM

## 2019-10-15 RX ORDER — LISINOPRIL 20 MG/1
20 TABLET ORAL DAILY
Qty: 30 TABLET | Refills: 3 | Status: SHIPPED | OUTPATIENT
Start: 2019-10-16 | End: 2019-11-19

## 2019-10-15 RX ORDER — LISINOPRIL 20 MG/1
20 TABLET ORAL DAILY
Status: DISCONTINUED | OUTPATIENT
Start: 2019-10-15 | End: 2019-10-15 | Stop reason: HOSPADM

## 2019-10-15 RX ORDER — FERROUS SULFATE TAB EC 324 MG (65 MG FE EQUIVALENT) 324 (65 FE) MG
324 TABLET DELAYED RESPONSE ORAL
Qty: 30 TABLET | Refills: 2 | Status: SHIPPED | OUTPATIENT
Start: 2019-10-16 | End: 2021-10-04 | Stop reason: CLARIF

## 2019-10-15 RX ORDER — PANTOPRAZOLE SODIUM 40 MG/1
40 TABLET, DELAYED RELEASE ORAL
Status: DISCONTINUED | OUTPATIENT
Start: 2019-10-15 | End: 2019-10-15 | Stop reason: HOSPADM

## 2019-10-15 RX ORDER — LISINOPRIL 20 MG/1
20 TABLET ORAL DAILY
Status: DISCONTINUED | OUTPATIENT
Start: 2019-10-16 | End: 2019-10-15

## 2019-10-15 RX ADMIN — ALPRAZOLAM 0.25 MG: 0.25 TABLET ORAL at 01:14

## 2019-10-15 RX ADMIN — FERROUS SULFATE TAB EC 324 MG (65 MG FE EQUIVALENT) 324 MG: 324 (65 FE) TABLET DELAYED RESPONSE at 10:36

## 2019-10-15 RX ADMIN — INSULIN LISPRO 4 UNITS: 100 INJECTION, SOLUTION INTRAVENOUS; SUBCUTANEOUS at 12:48

## 2019-10-15 RX ADMIN — OXYCODONE HYDROCHLORIDE AND ACETAMINOPHEN 1 TABLET: 5; 325 TABLET ORAL at 01:13

## 2019-10-15 RX ADMIN — ASPIRIN 81 MG 81 MG: 81 TABLET ORAL at 10:36

## 2019-10-15 RX ADMIN — METOPROLOL SUCCINATE 50 MG: 50 TABLET, EXTENDED RELEASE ORAL at 10:36

## 2019-10-15 RX ADMIN — PANTOPRAZOLE SODIUM 40 MG: 40 TABLET, DELAYED RELEASE ORAL at 10:36

## 2019-10-15 RX ADMIN — INSULIN LISPRO 12 UNITS: 100 INJECTION, SOLUTION INTRAVENOUS; SUBCUTANEOUS at 12:49

## 2019-10-15 RX ADMIN — LISINOPRIL 20 MG: 20 TABLET ORAL at 10:36

## 2019-10-15 RX ADMIN — INSULIN GLARGINE 20 UNITS: 100 INJECTION, SOLUTION SUBCUTANEOUS at 10:43

## 2019-10-15 RX ADMIN — CLOPIDOGREL BISULFATE 75 MG: 75 TABLET ORAL at 10:36

## 2019-10-15 RX ADMIN — NITROGLYCERIN 1 INCH: 20 OINTMENT TOPICAL at 01:14

## 2019-10-15 RX ADMIN — ENOXAPARIN SODIUM 40 MG: 40 INJECTION SUBCUTANEOUS at 10:39

## 2019-10-15 RX ADMIN — ISOSORBIDE MONONITRATE 30 MG: 30 TABLET, EXTENDED RELEASE ORAL at 10:36

## 2019-10-15 ASSESSMENT — PAIN DESCRIPTION - ORIENTATION: ORIENTATION: RIGHT

## 2019-10-15 ASSESSMENT — PAIN DESCRIPTION - PROGRESSION: CLINICAL_PROGRESSION: NOT CHANGED

## 2019-10-15 ASSESSMENT — PAIN DESCRIPTION - DESCRIPTORS: DESCRIPTORS: ACHING

## 2019-10-15 ASSESSMENT — PAIN DESCRIPTION - FREQUENCY: FREQUENCY: CONTINUOUS

## 2019-10-15 ASSESSMENT — PAIN DESCRIPTION - ONSET: ONSET: OTHER (COMMENT)

## 2019-10-15 ASSESSMENT — PAIN SCALES - GENERAL
PAINLEVEL_OUTOF10: 0
PAINLEVEL_OUTOF10: 0
PAINLEVEL_OUTOF10: 7
PAINLEVEL_OUTOF10: 0

## 2019-10-15 ASSESSMENT — PAIN - FUNCTIONAL ASSESSMENT: PAIN_FUNCTIONAL_ASSESSMENT: ACTIVITIES ARE NOT PREVENTED

## 2019-10-15 ASSESSMENT — PAIN DESCRIPTION - PAIN TYPE: TYPE: OTHER (COMMENT)

## 2019-10-15 ASSESSMENT — PAIN DESCRIPTION - LOCATION: LOCATION: HIP

## 2019-10-23 LAB
Lab: NORMAL
REPORT: NORMAL
THIS TEST SENT TO: NORMAL

## 2019-11-19 ENCOUNTER — OFFICE VISIT (OUTPATIENT)
Dept: CARDIOLOGY CLINIC | Age: 69
End: 2019-11-19
Payer: MEDICARE

## 2019-11-19 VITALS
BODY MASS INDEX: 20.83 KG/M2 | DIASTOLIC BLOOD PRESSURE: 60 MMHG | HEART RATE: 70 BPM | SYSTOLIC BLOOD PRESSURE: 118 MMHG | OXYGEN SATURATION: 97 % | HEIGHT: 61 IN

## 2019-11-19 DIAGNOSIS — D64.9 ANEMIA, UNSPECIFIED TYPE: ICD-10-CM

## 2019-11-19 DIAGNOSIS — E78.2 MIXED HYPERLIPIDEMIA: ICD-10-CM

## 2019-11-19 DIAGNOSIS — I25.10 CORONARY ARTERY DISEASE INVOLVING NATIVE CORONARY ARTERY OF NATIVE HEART WITHOUT ANGINA PECTORIS: ICD-10-CM

## 2019-11-19 DIAGNOSIS — I25.10 CORONARY ARTERY DISEASE INVOLVING NATIVE CORONARY ARTERY OF NATIVE HEART WITHOUT ANGINA PECTORIS: Primary | ICD-10-CM

## 2019-11-19 LAB
ANION GAP SERPL CALCULATED.3IONS-SCNC: 13 MMOL/L (ref 3–16)
BUN BLDV-MCNC: 24 MG/DL (ref 7–20)
CALCIUM SERPL-MCNC: 9 MG/DL (ref 8.3–10.6)
CHLORIDE BLD-SCNC: 104 MMOL/L (ref 99–110)
CO2: 22 MMOL/L (ref 21–32)
CREAT SERPL-MCNC: 1.3 MG/DL (ref 0.6–1.2)
GFR AFRICAN AMERICAN: 49
GFR NON-AFRICAN AMERICAN: 41
GLUCOSE BLD-MCNC: 130 MG/DL (ref 70–99)
HCT VFR BLD CALC: 30.2 % (ref 36–48)
HEMOGLOBIN: 10.4 G/DL (ref 12–16)
MCH RBC QN AUTO: 31.4 PG (ref 26–34)
MCHC RBC AUTO-ENTMCNC: 34.5 G/DL (ref 31–36)
MCV RBC AUTO: 91 FL (ref 80–100)
PDW BLD-RTO: 13.8 % (ref 12.4–15.4)
PLATELET # BLD: 178 K/UL (ref 135–450)
PMV BLD AUTO: 9.4 FL (ref 5–10.5)
POTASSIUM SERPL-SCNC: 4.8 MMOL/L (ref 3.5–5.1)
RBC # BLD: 3.32 M/UL (ref 4–5.2)
SODIUM BLD-SCNC: 139 MMOL/L (ref 136–145)
WBC # BLD: 6.6 K/UL (ref 4–11)

## 2019-11-19 PROCEDURE — 4040F PNEUMOC VAC/ADMIN/RCVD: CPT | Performed by: INTERNAL MEDICINE

## 2019-11-19 PROCEDURE — 99215 OFFICE O/P EST HI 40 MIN: CPT | Performed by: INTERNAL MEDICINE

## 2019-11-19 PROCEDURE — 1036F TOBACCO NON-USER: CPT | Performed by: INTERNAL MEDICINE

## 2019-11-19 PROCEDURE — G8400 PT W/DXA NO RESULTS DOC: HCPCS | Performed by: INTERNAL MEDICINE

## 2019-11-19 PROCEDURE — 3017F COLORECTAL CA SCREEN DOC REV: CPT | Performed by: INTERNAL MEDICINE

## 2019-11-19 PROCEDURE — G8420 CALC BMI NORM PARAMETERS: HCPCS | Performed by: INTERNAL MEDICINE

## 2019-11-19 PROCEDURE — 1090F PRES/ABSN URINE INCON ASSESS: CPT | Performed by: INTERNAL MEDICINE

## 2019-11-19 PROCEDURE — G8427 DOCREV CUR MEDS BY ELIG CLIN: HCPCS | Performed by: INTERNAL MEDICINE

## 2019-11-19 PROCEDURE — 1123F ACP DISCUSS/DSCN MKR DOCD: CPT | Performed by: INTERNAL MEDICINE

## 2019-11-19 PROCEDURE — G8598 ASA/ANTIPLAT THER USED: HCPCS | Performed by: INTERNAL MEDICINE

## 2019-11-19 PROCEDURE — G8484 FLU IMMUNIZE NO ADMIN: HCPCS | Performed by: INTERNAL MEDICINE

## 2019-11-20 DIAGNOSIS — I25.118 CORONARY ARTERY DISEASE OF NATIVE ARTERY OF NATIVE HEART WITH STABLE ANGINA PECTORIS (HCC): Primary | ICD-10-CM

## 2019-11-20 RX ORDER — FUROSEMIDE 20 MG/1
20 TABLET ORAL DAILY PRN
COMMUNITY
End: 2019-12-13

## 2019-11-22 ENCOUNTER — TELEPHONE (OUTPATIENT)
Dept: CARDIOLOGY CLINIC | Age: 69
End: 2019-11-22

## 2019-11-25 ENCOUNTER — HOSPITAL ENCOUNTER (OUTPATIENT)
Dept: CARDIAC CATH/INVASIVE PROCEDURES | Age: 69
Discharge: NURSING, REHAB OR LONG-TERM CARE | End: 2019-11-26
Attending: INTERNAL MEDICINE | Admitting: INTERNAL MEDICINE
Payer: MEDICARE

## 2019-11-25 DIAGNOSIS — D50.8 OTHER IRON DEFICIENCY ANEMIA: Primary | ICD-10-CM

## 2019-11-25 PROBLEM — I25.10 CAD S/P PERCUTANEOUS CORONARY ANGIOPLASTY: Status: ACTIVE | Noted: 2019-11-25

## 2019-11-25 PROBLEM — Z98.61 CAD S/P PERCUTANEOUS CORONARY ANGIOPLASTY: Status: ACTIVE | Noted: 2019-11-25

## 2019-11-25 LAB
EKG ATRIAL RATE: 66 BPM
EKG ATRIAL RATE: 76 BPM
EKG DIAGNOSIS: NORMAL
EKG DIAGNOSIS: NORMAL
EKG P AXIS: 54 DEGREES
EKG P AXIS: 55 DEGREES
EKG P-R INTERVAL: 156 MS
EKG P-R INTERVAL: 176 MS
EKG Q-T INTERVAL: 418 MS
EKG Q-T INTERVAL: 436 MS
EKG QRS DURATION: 70 MS
EKG QRS DURATION: 72 MS
EKG QTC CALCULATION (BAZETT): 457 MS
EKG QTC CALCULATION (BAZETT): 470 MS
EKG R AXIS: 37 DEGREES
EKG R AXIS: 40 DEGREES
EKG T AXIS: 59 DEGREES
EKG T AXIS: 97 DEGREES
EKG VENTRICULAR RATE: 66 BPM
EKG VENTRICULAR RATE: 76 BPM
GLUCOSE BLD-MCNC: 181 MG/DL (ref 70–99)
GLUCOSE BLD-MCNC: 331 MG/DL (ref 70–99)
PERFORMED ON: ABNORMAL
PERFORMED ON: ABNORMAL
POC ACT LR: 202 SEC
POC ACT LR: 315 SEC
POC ACT LR: >400 SEC

## 2019-11-25 PROCEDURE — 93005 ELECTROCARDIOGRAM TRACING: CPT | Performed by: INTERNAL MEDICINE

## 2019-11-25 PROCEDURE — 6360000004 HC RX CONTRAST MEDICATION: Performed by: INTERNAL MEDICINE

## 2019-11-25 PROCEDURE — 99153 MOD SED SAME PHYS/QHP EA: CPT

## 2019-11-25 PROCEDURE — 92978 ENDOLUMINL IVUS OCT C 1ST: CPT

## 2019-11-25 PROCEDURE — C1769 GUIDE WIRE: HCPCS

## 2019-11-25 PROCEDURE — C1874 STENT, COATED/COV W/DEL SYS: HCPCS

## 2019-11-25 PROCEDURE — C1887 CATHETER, GUIDING: HCPCS

## 2019-11-25 PROCEDURE — C1725 CATH, TRANSLUMIN NON-LASER: HCPCS

## 2019-11-25 PROCEDURE — 99152 MOD SED SAME PHYS/QHP 5/>YRS: CPT

## 2019-11-25 PROCEDURE — 92978 ENDOLUMINL IVUS OCT C 1ST: CPT | Performed by: INTERNAL MEDICINE

## 2019-11-25 PROCEDURE — 6360000002 HC RX W HCPCS

## 2019-11-25 PROCEDURE — 6370000000 HC RX 637 (ALT 250 FOR IP): Performed by: INTERNAL MEDICINE

## 2019-11-25 PROCEDURE — C9600 PERC DRUG-EL COR STENT SING: HCPCS

## 2019-11-25 PROCEDURE — 2580000003 HC RX 258: Performed by: INTERNAL MEDICINE

## 2019-11-25 PROCEDURE — 85347 COAGULATION TIME ACTIVATED: CPT

## 2019-11-25 PROCEDURE — C1753 CATH, INTRAVAS ULTRASOUND: HCPCS

## 2019-11-25 PROCEDURE — 92928 PRQ TCAT PLMT NTRAC ST 1 LES: CPT | Performed by: INTERNAL MEDICINE

## 2019-11-25 PROCEDURE — 6370000000 HC RX 637 (ALT 250 FOR IP)

## 2019-11-25 PROCEDURE — 6360000002 HC RX W HCPCS: Performed by: INTERNAL MEDICINE

## 2019-11-25 PROCEDURE — C1894 INTRO/SHEATH, NON-LASER: HCPCS

## 2019-11-25 PROCEDURE — 94761 N-INVAS EAR/PLS OXIMETRY MLT: CPT

## 2019-11-25 PROCEDURE — 2500000003 HC RX 250 WO HCPCS

## 2019-11-25 PROCEDURE — 92979 ENDOLUMINL IVUS OCT C EA: CPT

## 2019-11-25 RX ORDER — GABAPENTIN 100 MG/1
100 CAPSULE ORAL 3 TIMES DAILY
Status: DISCONTINUED | OUTPATIENT
Start: 2019-11-25 | End: 2019-11-26 | Stop reason: HOSPADM

## 2019-11-25 RX ORDER — ROSUVASTATIN CALCIUM 10 MG/1
40 TABLET, COATED ORAL NIGHTLY
Status: DISCONTINUED | OUTPATIENT
Start: 2019-11-25 | End: 2019-11-26 | Stop reason: HOSPADM

## 2019-11-25 RX ORDER — ACETAMINOPHEN 325 MG/1
650 TABLET ORAL EVERY 4 HOURS PRN
Status: DISCONTINUED | OUTPATIENT
Start: 2019-11-25 | End: 2019-11-26 | Stop reason: HOSPADM

## 2019-11-25 RX ORDER — SODIUM CHLORIDE 0.9 % (FLUSH) 0.9 %
10 SYRINGE (ML) INJECTION PRN
Status: DISCONTINUED | OUTPATIENT
Start: 2019-11-25 | End: 2019-11-25 | Stop reason: SDUPTHER

## 2019-11-25 RX ORDER — ONDANSETRON 2 MG/ML
4 INJECTION INTRAMUSCULAR; INTRAVENOUS EVERY 6 HOURS PRN
Status: DISCONTINUED | OUTPATIENT
Start: 2019-11-25 | End: 2019-11-26 | Stop reason: HOSPADM

## 2019-11-25 RX ORDER — CLOPIDOGREL BISULFATE 75 MG/1
75 TABLET ORAL DAILY
Status: DISCONTINUED | OUTPATIENT
Start: 2019-11-25 | End: 2019-11-25 | Stop reason: SDUPTHER

## 2019-11-25 RX ORDER — HYDRALAZINE HYDROCHLORIDE 20 MG/ML
10 INJECTION INTRAMUSCULAR; INTRAVENOUS
Status: DISCONTINUED | OUTPATIENT
Start: 2019-11-25 | End: 2019-11-26 | Stop reason: HOSPADM

## 2019-11-25 RX ORDER — SODIUM CHLORIDE 0.9 % (FLUSH) 0.9 %
10 SYRINGE (ML) INJECTION EVERY 12 HOURS SCHEDULED
Status: DISCONTINUED | OUTPATIENT
Start: 2019-11-25 | End: 2019-11-26 | Stop reason: HOSPADM

## 2019-11-25 RX ORDER — CLOPIDOGREL BISULFATE 75 MG/1
75 TABLET ORAL DAILY
Status: DISCONTINUED | OUTPATIENT
Start: 2019-11-26 | End: 2019-11-26

## 2019-11-25 RX ORDER — METOPROLOL SUCCINATE 50 MG/1
50 TABLET, EXTENDED RELEASE ORAL DAILY
Status: DISCONTINUED | OUTPATIENT
Start: 2019-11-25 | End: 2019-11-26 | Stop reason: HOSPADM

## 2019-11-25 RX ORDER — LOPERAMIDE HYDROCHLORIDE 2 MG/1
2 CAPSULE ORAL 4 TIMES DAILY PRN
Status: DISCONTINUED | OUTPATIENT
Start: 2019-11-25 | End: 2019-11-26 | Stop reason: HOSPADM

## 2019-11-25 RX ORDER — MIRTAZAPINE 15 MG/1
15 TABLET, FILM COATED ORAL NIGHTLY
Status: DISCONTINUED | OUTPATIENT
Start: 2019-11-25 | End: 2019-11-26 | Stop reason: HOSPADM

## 2019-11-25 RX ORDER — SODIUM CHLORIDE 9 MG/ML
INJECTION, SOLUTION INTRAVENOUS CONTINUOUS
Status: DISCONTINUED | OUTPATIENT
Start: 2019-11-25 | End: 2019-11-26 | Stop reason: HOSPADM

## 2019-11-25 RX ORDER — SODIUM CHLORIDE 0.9 % (FLUSH) 0.9 %
10 SYRINGE (ML) INJECTION EVERY 12 HOURS SCHEDULED
Status: DISCONTINUED | OUTPATIENT
Start: 2019-11-25 | End: 2019-11-25 | Stop reason: SDUPTHER

## 2019-11-25 RX ORDER — MORPHINE SULFATE 2 MG/ML
2 INJECTION, SOLUTION INTRAMUSCULAR; INTRAVENOUS
Status: ACTIVE | OUTPATIENT
Start: 2019-11-25 | End: 2019-11-25

## 2019-11-25 RX ORDER — ISOSORBIDE MONONITRATE 60 MG/1
60 TABLET, EXTENDED RELEASE ORAL DAILY
Status: ON HOLD | COMMUNITY
End: 2019-11-26 | Stop reason: HOSPADM

## 2019-11-25 RX ORDER — ISOSORBIDE MONONITRATE 60 MG/1
60 TABLET, EXTENDED RELEASE ORAL DAILY
Status: DISCONTINUED | OUTPATIENT
Start: 2019-11-25 | End: 2019-11-25 | Stop reason: SDUPTHER

## 2019-11-25 RX ORDER — NICOTINE POLACRILEX 4 MG
15 LOZENGE BUCCAL PRN
Status: DISCONTINUED | OUTPATIENT
Start: 2019-11-25 | End: 2019-11-26 | Stop reason: HOSPADM

## 2019-11-25 RX ORDER — METOCLOPRAMIDE HYDROCHLORIDE 5 MG/5ML
5 SOLUTION ORAL
Status: DISCONTINUED | OUTPATIENT
Start: 2019-11-25 | End: 2019-11-26 | Stop reason: HOSPADM

## 2019-11-25 RX ORDER — ISOSORBIDE MONONITRATE 60 MG/1
60 TABLET, EXTENDED RELEASE ORAL EVERY MORNING
Status: DISCONTINUED | OUTPATIENT
Start: 2019-11-25 | End: 2019-11-26 | Stop reason: HOSPADM

## 2019-11-25 RX ORDER — DEXTROSE MONOHYDRATE 50 MG/ML
100 INJECTION, SOLUTION INTRAVENOUS PRN
Status: DISCONTINUED | OUTPATIENT
Start: 2019-11-25 | End: 2019-11-26 | Stop reason: HOSPADM

## 2019-11-25 RX ORDER — ASPIRIN 81 MG/1
81 TABLET ORAL DAILY
Status: DISCONTINUED | OUTPATIENT
Start: 2019-11-25 | End: 2019-11-25 | Stop reason: SDUPTHER

## 2019-11-25 RX ORDER — LOPERAMIDE HYDROCHLORIDE 2 MG/1
2 CAPSULE ORAL 4 TIMES DAILY PRN
COMMUNITY
End: 2021-10-04 | Stop reason: CLARIF

## 2019-11-25 RX ORDER — BISACODYL 10 MG
10 SUPPOSITORY, RECTAL RECTAL DAILY PRN
Status: DISCONTINUED | OUTPATIENT
Start: 2019-11-25 | End: 2019-11-26 | Stop reason: HOSPADM

## 2019-11-25 RX ORDER — ASPIRIN 325 MG
325 TABLET ORAL ONCE
Status: DISCONTINUED | OUTPATIENT
Start: 2019-11-25 | End: 2019-11-26 | Stop reason: HOSPADM

## 2019-11-25 RX ORDER — OXYCODONE HYDROCHLORIDE AND ACETAMINOPHEN 5; 325 MG/1; MG/1
1 TABLET ORAL EVERY 8 HOURS PRN
Status: DISCONTINUED | OUTPATIENT
Start: 2019-11-25 | End: 2019-11-26 | Stop reason: HOSPADM

## 2019-11-25 RX ORDER — INSULIN GLARGINE 100 [IU]/ML
20 INJECTION, SOLUTION SUBCUTANEOUS DAILY
Status: DISCONTINUED | OUTPATIENT
Start: 2019-11-26 | End: 2019-11-26 | Stop reason: HOSPADM

## 2019-11-25 RX ORDER — FERROUS SULFATE TAB EC 324 MG (65 MG FE EQUIVALENT) 324 (65 FE) MG
324 TABLET DELAYED RESPONSE ORAL
Status: DISCONTINUED | OUTPATIENT
Start: 2019-11-26 | End: 2019-11-26 | Stop reason: HOSPADM

## 2019-11-25 RX ORDER — NITROGLYCERIN 0.4 MG/1
0.4 TABLET SUBLINGUAL EVERY 5 MIN PRN
Status: DISCONTINUED | OUTPATIENT
Start: 2019-11-25 | End: 2019-11-26 | Stop reason: HOSPADM

## 2019-11-25 RX ORDER — SODIUM CHLORIDE 0.9 % (FLUSH) 0.9 %
10 SYRINGE (ML) INJECTION PRN
Status: DISCONTINUED | OUTPATIENT
Start: 2019-11-25 | End: 2019-11-26 | Stop reason: HOSPADM

## 2019-11-25 RX ORDER — PAROXETINE 10 MG/1
20 TABLET, FILM COATED ORAL EVERY MORNING
Status: DISCONTINUED | OUTPATIENT
Start: 2019-11-25 | End: 2019-11-26 | Stop reason: HOSPADM

## 2019-11-25 RX ORDER — ALPRAZOLAM 0.25 MG/1
0.25 TABLET ORAL NIGHTLY PRN
Status: DISCONTINUED | OUTPATIENT
Start: 2019-11-25 | End: 2019-11-26 | Stop reason: HOSPADM

## 2019-11-25 RX ORDER — METOCLOPRAMIDE HYDROCHLORIDE 5 MG/5ML
5 SOLUTION ORAL
COMMUNITY
End: 2021-10-04 | Stop reason: CLARIF

## 2019-11-25 RX ORDER — DEXTROSE MONOHYDRATE 25 G/50ML
12.5 INJECTION, SOLUTION INTRAVENOUS PRN
Status: DISCONTINUED | OUTPATIENT
Start: 2019-11-25 | End: 2019-11-26 | Stop reason: HOSPADM

## 2019-11-25 RX ORDER — ASPIRIN 81 MG/1
81 TABLET, CHEWABLE ORAL DAILY
Status: DISCONTINUED | OUTPATIENT
Start: 2019-11-26 | End: 2019-11-26 | Stop reason: HOSPADM

## 2019-11-25 RX ORDER — ATROPINE SULFATE 0.4 MG/ML
0.5 AMPUL (ML) INJECTION
Status: DISPENSED | OUTPATIENT
Start: 2019-11-25 | End: 2019-11-25

## 2019-11-25 RX ORDER — 0.9 % SODIUM CHLORIDE 0.9 %
250 INTRAVENOUS SOLUTION INTRAVENOUS PRN
Status: DISCONTINUED | OUTPATIENT
Start: 2019-11-25 | End: 2019-11-26 | Stop reason: HOSPADM

## 2019-11-25 RX ORDER — POTASSIUM CHLORIDE 750 MG/1
10 TABLET, FILM COATED, EXTENDED RELEASE ORAL DAILY
Status: DISCONTINUED | OUTPATIENT
Start: 2019-11-25 | End: 2019-11-26 | Stop reason: HOSPADM

## 2019-11-25 RX ADMIN — METOCLOPRAMIDE HYDROCHLORIDE 5 MG: 5 SOLUTION ORAL at 17:05

## 2019-11-25 RX ADMIN — ISOSORBIDE MONONITRATE 60 MG: 60 TABLET, EXTENDED RELEASE ORAL at 14:53

## 2019-11-25 RX ADMIN — PAROXETINE HYDROCHLORIDE 20 MG: 10 TABLET, FILM COATED ORAL at 14:47

## 2019-11-25 RX ADMIN — GABAPENTIN 100 MG: 100 CAPSULE ORAL at 14:46

## 2019-11-25 RX ADMIN — SODIUM CHLORIDE: 9 INJECTION, SOLUTION INTRAVENOUS at 14:07

## 2019-11-25 RX ADMIN — Medication 10 ML: at 14:47

## 2019-11-25 RX ADMIN — METOPROLOL SUCCINATE 50 MG: 50 TABLET, EXTENDED RELEASE ORAL at 14:46

## 2019-11-25 RX ADMIN — METOCLOPRAMIDE HYDROCHLORIDE 5 MG: 5 SOLUTION ORAL at 20:44

## 2019-11-25 RX ADMIN — ROSUVASTATIN CALCIUM 40 MG: 10 TABLET, FILM COATED ORAL at 20:44

## 2019-11-25 RX ADMIN — Medication 10 ML: at 20:44

## 2019-11-25 RX ADMIN — POTASSIUM CHLORIDE 10 MEQ: 750 TABLET, EXTENDED RELEASE ORAL at 14:46

## 2019-11-25 RX ADMIN — IOPAMIDOL 246 ML: 755 INJECTION, SOLUTION INTRAVENOUS at 11:43

## 2019-11-25 RX ADMIN — TIROFIBAN 0.07 MCG/KG/MIN: 5 INJECTION, SOLUTION INTRAVENOUS at 14:07

## 2019-11-25 RX ADMIN — GABAPENTIN 100 MG: 100 CAPSULE ORAL at 20:44

## 2019-11-25 RX ADMIN — MIRTAZAPINE 15 MG: 15 TABLET, FILM COATED ORAL at 20:44

## 2019-11-25 ASSESSMENT — PAIN SCALES - GENERAL
PAINLEVEL_OUTOF10: 0

## 2019-11-26 VITALS
WEIGHT: 99.21 LBS | RESPIRATION RATE: 16 BRPM | DIASTOLIC BLOOD PRESSURE: 62 MMHG | BODY MASS INDEX: 18.73 KG/M2 | HEIGHT: 61 IN | HEART RATE: 52 BPM | TEMPERATURE: 97.6 F | OXYGEN SATURATION: 97 % | SYSTOLIC BLOOD PRESSURE: 113 MMHG

## 2019-11-26 LAB
ANION GAP SERPL CALCULATED.3IONS-SCNC: 12 MMOL/L (ref 3–16)
BUN BLDV-MCNC: 35 MG/DL (ref 7–20)
CALCIUM SERPL-MCNC: 9.3 MG/DL (ref 8.3–10.6)
CHLORIDE BLD-SCNC: 102 MMOL/L (ref 99–110)
CO2: 23 MMOL/L (ref 21–32)
CREAT SERPL-MCNC: 1.6 MG/DL (ref 0.6–1.2)
GFR AFRICAN AMERICAN: 39
GFR NON-AFRICAN AMERICAN: 32
GLUCOSE BLD-MCNC: 111 MG/DL (ref 70–99)
GLUCOSE BLD-MCNC: 118 MG/DL (ref 70–99)
GLUCOSE BLD-MCNC: 96 MG/DL (ref 70–99)
PERFORMED ON: ABNORMAL
PERFORMED ON: ABNORMAL
PLATELET # BLD: 193 K/UL (ref 135–450)
POC ACT LR: 169 SEC
POTASSIUM SERPL-SCNC: 4.2 MMOL/L (ref 3.5–5.1)
REASON FOR REJECTION: NORMAL
REJECTED TEST: NORMAL
SODIUM BLD-SCNC: 137 MMOL/L (ref 136–145)

## 2019-11-26 PROCEDURE — 99217 PR OBSERVATION CARE DISCHARGE MANAGEMENT: CPT | Performed by: NURSE PRACTITIONER

## 2019-11-26 PROCEDURE — 36415 COLL VENOUS BLD VENIPUNCTURE: CPT

## 2019-11-26 PROCEDURE — 6370000000 HC RX 637 (ALT 250 FOR IP): Performed by: INTERNAL MEDICINE

## 2019-11-26 PROCEDURE — 80048 BASIC METABOLIC PNL TOTAL CA: CPT

## 2019-11-26 PROCEDURE — 85049 AUTOMATED PLATELET COUNT: CPT

## 2019-11-26 PROCEDURE — 2580000003 HC RX 258: Performed by: INTERNAL MEDICINE

## 2019-11-26 PROCEDURE — 94760 N-INVAS EAR/PLS OXIMETRY 1: CPT

## 2019-11-26 RX ORDER — METOPROLOL SUCCINATE 50 MG/1
50 TABLET, EXTENDED RELEASE ORAL DAILY
Qty: 90 TABLET | Refills: 3
Start: 2019-11-26 | End: 2019-12-04 | Stop reason: DRUGHIGH

## 2019-11-26 RX ADMIN — METOCLOPRAMIDE HYDROCHLORIDE 5 MG: 5 SOLUTION ORAL at 11:51

## 2019-11-26 RX ADMIN — INSULIN GLARGINE 20 UNITS: 100 INJECTION, SOLUTION SUBCUTANEOUS at 09:04

## 2019-11-26 RX ADMIN — FERROUS SULFATE TAB EC 324 MG (65 MG FE EQUIVALENT) 324 MG: 324 (65 FE) TABLET DELAYED RESPONSE at 09:01

## 2019-11-26 RX ADMIN — METOPROLOL SUCCINATE 50 MG: 50 TABLET, EXTENDED RELEASE ORAL at 09:01

## 2019-11-26 RX ADMIN — ISOSORBIDE MONONITRATE 60 MG: 60 TABLET, EXTENDED RELEASE ORAL at 09:01

## 2019-11-26 RX ADMIN — PAROXETINE HYDROCHLORIDE 20 MG: 10 TABLET, FILM COATED ORAL at 09:01

## 2019-11-26 RX ADMIN — Medication 10 ML: at 09:02

## 2019-11-26 RX ADMIN — ASPIRIN 81 MG 81 MG: 81 TABLET ORAL at 09:01

## 2019-11-26 RX ADMIN — POTASSIUM CHLORIDE 10 MEQ: 750 TABLET, EXTENDED RELEASE ORAL at 09:01

## 2019-11-26 RX ADMIN — GABAPENTIN 100 MG: 100 CAPSULE ORAL at 09:01

## 2019-11-26 RX ADMIN — CLOPIDOGREL BISULFATE 75 MG: 75 TABLET ORAL at 09:01

## 2019-11-26 RX ADMIN — METOCLOPRAMIDE HYDROCHLORIDE 5 MG: 5 SOLUTION ORAL at 06:20

## 2019-11-26 ASSESSMENT — PAIN SCALES - GENERAL
PAINLEVEL_OUTOF10: 0
PAINLEVEL_OUTOF10: 0

## 2019-12-03 ENCOUNTER — TELEPHONE (OUTPATIENT)
Dept: CARDIOLOGY CLINIC | Age: 69
End: 2019-12-03

## 2019-12-04 RX ORDER — ISOSORBIDE MONONITRATE 30 MG/1
30 TABLET, EXTENDED RELEASE ORAL DAILY
COMMUNITY
End: 2020-03-13 | Stop reason: DRUGHIGH

## 2019-12-04 RX ORDER — METOPROLOL SUCCINATE 25 MG/1
12.5 TABLET, EXTENDED RELEASE ORAL DAILY
COMMUNITY
End: 2020-03-13 | Stop reason: SDUPTHER

## 2019-12-04 RX ORDER — MIDODRINE HYDROCHLORIDE 5 MG/1
5 TABLET ORAL 3 TIMES DAILY
COMMUNITY
End: 2020-07-08 | Stop reason: ALTCHOICE

## 2019-12-13 ENCOUNTER — OFFICE VISIT (OUTPATIENT)
Dept: CARDIOLOGY CLINIC | Age: 69
End: 2019-12-13
Payer: MEDICARE

## 2019-12-13 VITALS
OXYGEN SATURATION: 93 % | SYSTOLIC BLOOD PRESSURE: 130 MMHG | HEIGHT: 61 IN | DIASTOLIC BLOOD PRESSURE: 80 MMHG | BODY MASS INDEX: 18.74 KG/M2 | HEART RATE: 61 BPM

## 2019-12-13 DIAGNOSIS — E78.00 PURE HYPERCHOLESTEROLEMIA: ICD-10-CM

## 2019-12-13 DIAGNOSIS — I10 ESSENTIAL HYPERTENSION: ICD-10-CM

## 2019-12-13 DIAGNOSIS — I25.10 ATHEROSCLEROSIS OF NATIVE CORONARY ARTERY OF NATIVE HEART WITHOUT ANGINA PECTORIS: Primary | ICD-10-CM

## 2019-12-13 PROCEDURE — 1090F PRES/ABSN URINE INCON ASSESS: CPT | Performed by: INTERNAL MEDICINE

## 2019-12-13 PROCEDURE — 99214 OFFICE O/P EST MOD 30 MIN: CPT | Performed by: INTERNAL MEDICINE

## 2019-12-13 PROCEDURE — G8484 FLU IMMUNIZE NO ADMIN: HCPCS | Performed by: INTERNAL MEDICINE

## 2019-12-13 PROCEDURE — G8420 CALC BMI NORM PARAMETERS: HCPCS | Performed by: INTERNAL MEDICINE

## 2019-12-13 PROCEDURE — 1123F ACP DISCUSS/DSCN MKR DOCD: CPT | Performed by: INTERNAL MEDICINE

## 2019-12-13 PROCEDURE — G8598 ASA/ANTIPLAT THER USED: HCPCS | Performed by: INTERNAL MEDICINE

## 2019-12-13 PROCEDURE — 1036F TOBACCO NON-USER: CPT | Performed by: INTERNAL MEDICINE

## 2019-12-13 PROCEDURE — G8400 PT W/DXA NO RESULTS DOC: HCPCS | Performed by: INTERNAL MEDICINE

## 2019-12-13 PROCEDURE — 4040F PNEUMOC VAC/ADMIN/RCVD: CPT | Performed by: INTERNAL MEDICINE

## 2019-12-13 PROCEDURE — G8427 DOCREV CUR MEDS BY ELIG CLIN: HCPCS | Performed by: INTERNAL MEDICINE

## 2019-12-13 PROCEDURE — 3017F COLORECTAL CA SCREEN DOC REV: CPT | Performed by: INTERNAL MEDICINE

## 2019-12-13 RX ORDER — ONDANSETRON 4 MG/1
4 TABLET, ORALLY DISINTEGRATING ORAL EVERY 8 HOURS PRN
COMMUNITY
End: 2021-10-04 | Stop reason: CLARIF

## 2019-12-13 ASSESSMENT — ENCOUNTER SYMPTOMS
COLOR CHANGE: 0
WHEEZING: 0
EYE REDNESS: 0
BLOOD IN STOOL: 0
CHEST TIGHTNESS: 0
ABDOMINAL PAIN: 0
EYE PAIN: 0
SHORTNESS OF BREATH: 0
COUGH: 0

## 2020-02-18 ENCOUNTER — TELEPHONE (OUTPATIENT)
Dept: CARDIOLOGY CLINIC | Age: 70
End: 2020-02-18

## 2020-02-18 NOTE — TELEPHONE ENCOUNTER
Patient's son called inquiring about moving her appointment sooner. He said she had stents placed in November, but as far as he can tell her condition has only worsened. She is currently in [facility], and he states she is continuously having mini strokes. He said he is aware that she had a blockage in her shoulder at one point and didn't know if that could be causing it. She is experiencing left arm weakness, leg weakness and she is very cold all of the time. He would like to know if it is possible that her stents have failed or closed.

## 2020-02-18 NOTE — TELEPHONE ENCOUNTER
Called number listed in chart and spoke with [facility] who states she is Jessa's caregiver. Deo Martinez (son) was unavailable and wanted to scheduled an appointment when she is discharged home from the rehab facility. She states he has a cell number [phone], attempted to call, mailbox full. Instructed her to have him return call to further discuss and will move up appointment if needed.

## 2020-03-04 PROBLEM — R53.83 FATIGUE: Status: ACTIVE | Noted: 2020-03-04

## 2020-03-04 ASSESSMENT — ENCOUNTER SYMPTOMS
COUGH: 0
COLOR CHANGE: 0
EYE REDNESS: 0
EYE PAIN: 0
WHEEZING: 0
CHEST TIGHTNESS: 0
BLOOD IN STOOL: 0
ABDOMINAL PAIN: 0
SHORTNESS OF BREATH: 0

## 2020-03-04 NOTE — PROGRESS NOTES
Neurological: She is alert and oriented to person, place, and time. Skin: Skin is warm and dry. Psychiatric: She has a normal mood and affect. Her behavior is normal.   Nursing note and vitals reviewed.       Wt Readings from Last 3 Encounters:   03/13/20 107 lb (48.5 kg)   11/26/19 99 lb 3.3 oz (45 kg)   10/15/19 110 lb 3.7 oz (50 kg)     BP Readings from Last 3 Encounters:   03/13/20 130/80   12/13/19 130/80   11/26/19 113/62     Pulse Readings from Last 3 Encounters:   03/13/20 60   12/13/19 61   11/26/19 52         Current Outpatient Medications:     isosorbide mononitrate (IMDUR) 30 MG extended release tablet, Take 0.5 tablets by mouth daily, Disp: , Rfl:     Insulin Aspart (NOVOLOG SC), Inject into the skin, Disp: , Rfl:     ondansetron (ZOFRAN-ODT) 4 MG disintegrating tablet, Take 4 mg by mouth every 8 hours as needed for Nausea or Vomiting, Disp: , Rfl:     midodrine (PROAMATINE) 5 MG tablet, Take 5 mg by mouth 3 times daily, Disp: , Rfl:     metoprolol succinate (TOPROL XL) 25 MG extended release tablet, Take 12.5 mg by mouth daily, Disp: , Rfl:     ticagrelor (BRILINTA) 90 MG TABS tablet, Take 1 tablet by mouth 2 times daily, Disp: 60 tablet, Rfl: 11    loperamide (IMODIUM) 2 MG capsule, Take 2 mg by mouth 4 times daily as needed for Diarrhea, Disp: , Rfl:     metoclopramide (REGLAN) 5 MG/5ML solution, Take 5 mg by mouth 4 times daily (before meals and nightly), Disp: , Rfl:     Insulin Glargine (BASAGLAR KWIKPEN SC), Inject into the skin daily, Disp: , Rfl:     Mirtazapine (REMERON PO), Take 15 mg by mouth nightly , Disp: , Rfl:     ferrous sulfate 324 (65 Fe) MG EC tablet, Take 1 tablet by mouth daily (with breakfast), Disp: 30 tablet, Rfl: 2    rosuvastatin (CRESTOR) 40 MG tablet, Take 40 mg by mouth daily, Disp: , Rfl:     aspirin 81 MG EC tablet, Take 81 mg by mouth daily, Disp: , Rfl:     PARoxetine (PAXIL) 20 MG tablet, Take 20 mg by mouth every morning, Disp: , Rfl:    partner violence     Fear of current or ex partner: None     Emotionally abused: None     Physically abused: None     Forced sexual activity: None   Other Topics Concern    None   Social History Narrative    None       Past Surgical History:   Procedure Laterality Date    BREAST SURGERY      left breast tumor removed    CARDIAC SURGERY  2000    CABG    CHOLECYSTECTOMY      CORONARY ANGIOPLASTY WITH STENT PLACEMENT  2019    CORONARY ARTERY BYPASS GRAFT      DIAGNOSTIC CARDIAC CATH LAB PROCEDURE      KNEE SURGERY      UPPER GASTROINTESTINAL ENDOSCOPY N/A 10/14/2019    EGD DIAGNOSTIC ONLY performed by Kim Thompson MD at [address] History   Problem Relation Age of Onset    Stroke Mother     Heart Disease Father        Allergies   Allergen Reactions    Iodine Hives       Recent Labs and Imaging reviewed    Assessment     CAD (coronary artery disease)     s/p CABG 2000. Cath 9/2010- 1/3 grafts occluded. Patent LIMA-LAD, patent SVG-OM, 60% distal LAD lesion distal to anastamosis, occluded SVG-LCX,, 70% prox native LCx, diffuse RCA dz. Declines redo CABG. Try medical therapy, consider PCI of LAD and LCX in future if medical therapy fails. Nuc GXT 9/23/14 no ischemia. Echo 12/2014 LVEF 55-60%. Nuc GXT 10/2019 fixed defect inferior wall minimal felipa-infarct ischemia, LVEF 81%. CATH IMPRESSION 11/25/19:  Successful angioplasty followed by IVUS direct  stent deployment in the ostium of the left main, proximal circumflex  artery, and mid portion of the circumflex artery. Ostial 80% stenosis  reduced to 0% with the help of a drug-eluting stent final diameter of  4.13. Proximal circumflex artery 80% to 90% stenosis reduced to 0% with  a drug-eluting stent final diameter of 3.91. The successful angioplasty  followed by stent deployment in the mid portion of the circumflex artery  70% to 75% stenosis reduced to 0% final diameter of 2.59. The patient  tolerated the procedure well.     Echo 12/2019 LVEF normal, grade I DD, mildly reduced RVSF, mild PI.       Diabetes mellitus     followed by PCP.  Hyperlipidemia     On statin  (muscle aches on higher doses of statins.)      LDL 88 2/2020.  Syncope/Lightheadedness     Likely secondary to medications and hypotension. Event recorder 11/217 no arrhythmias.  Lung mass      benign      Carotid artery disease - UL 6/2018 70-99% BICAS referred to Dr. Esther Gonzalez. US 4/2019 L SC stenosis, 50-69% BICAS. Hypotension -chronic. Midodrine prescribed. Intrascapular pain/ right sided chest soreness -chronic since CABG, unchanged. PAD/Claudication- Bilateral occlusion of posterior tibial arteries, no evidence of aneurysm. Evaluated by Dr. Mata Mena in past.         Anemia. H/H 10.4, 30.2 11/2019         CKD Cr 1.4-1.6. Plan     No angina or clinical evidence of CHF. Orthostatic changes discussed. Advised to wear GABBIE hose stockings. Continue ASA, statin, BB, Brilinta and nitrates. Will also reduce imdur to 15mg daily. Importance of not stopping Brilinta and ASA discussed. Recommend repeating carotid doppler studies to evaluate bruit/progression of dz. Return in about 6 months (around 9/13/2020). This note was scribed in the presence of the physician by Feliciano Dandy RN. The scribes documentation has been prepared under my direction and personally reviewed by me in its entirety. I confirm that the note above accurately reflects all work, treatment, procedures, and medical decision making performed by me.

## 2020-03-13 ENCOUNTER — OFFICE VISIT (OUTPATIENT)
Dept: CARDIOLOGY CLINIC | Age: 70
End: 2020-03-13
Payer: MEDICARE

## 2020-03-13 VITALS
BODY MASS INDEX: 20.2 KG/M2 | SYSTOLIC BLOOD PRESSURE: 130 MMHG | WEIGHT: 107 LBS | HEIGHT: 61 IN | DIASTOLIC BLOOD PRESSURE: 80 MMHG | HEART RATE: 60 BPM

## 2020-03-13 PROCEDURE — 4040F PNEUMOC VAC/ADMIN/RCVD: CPT | Performed by: INTERNAL MEDICINE

## 2020-03-13 PROCEDURE — 1036F TOBACCO NON-USER: CPT | Performed by: INTERNAL MEDICINE

## 2020-03-13 PROCEDURE — G8427 DOCREV CUR MEDS BY ELIG CLIN: HCPCS | Performed by: INTERNAL MEDICINE

## 2020-03-13 PROCEDURE — 3017F COLORECTAL CA SCREEN DOC REV: CPT | Performed by: INTERNAL MEDICINE

## 2020-03-13 PROCEDURE — G8420 CALC BMI NORM PARAMETERS: HCPCS | Performed by: INTERNAL MEDICINE

## 2020-03-13 PROCEDURE — 1123F ACP DISCUSS/DSCN MKR DOCD: CPT | Performed by: INTERNAL MEDICINE

## 2020-03-13 PROCEDURE — G8484 FLU IMMUNIZE NO ADMIN: HCPCS | Performed by: INTERNAL MEDICINE

## 2020-03-13 PROCEDURE — G8400 PT W/DXA NO RESULTS DOC: HCPCS | Performed by: INTERNAL MEDICINE

## 2020-03-13 PROCEDURE — 99215 OFFICE O/P EST HI 40 MIN: CPT | Performed by: INTERNAL MEDICINE

## 2020-03-13 PROCEDURE — 1090F PRES/ABSN URINE INCON ASSESS: CPT | Performed by: INTERNAL MEDICINE

## 2020-03-13 RX ORDER — ISOSORBIDE MONONITRATE 30 MG/1
15 TABLET, EXTENDED RELEASE ORAL DAILY
Qty: 45 TABLET | Refills: 3 | Status: SHIPPED | OUTPATIENT
Start: 2020-03-13 | End: 2021-10-04 | Stop reason: CLARIF

## 2020-03-13 RX ORDER — ISOSORBIDE MONONITRATE 30 MG/1
15 TABLET, EXTENDED RELEASE ORAL DAILY
Status: SHIPPED | COMMUNITY
Start: 2020-03-13 | End: 2020-03-13 | Stop reason: SDUPTHER

## 2020-03-13 RX ORDER — ROSUVASTATIN CALCIUM 40 MG/1
40 TABLET, COATED ORAL DAILY
Qty: 90 TABLET | Refills: 3 | Status: SHIPPED | OUTPATIENT
Start: 2020-03-13 | End: 2021-10-04 | Stop reason: CLARIF

## 2020-03-13 RX ORDER — METOPROLOL SUCCINATE 25 MG/1
12.5 TABLET, EXTENDED RELEASE ORAL DAILY
Qty: 90 TABLET | Refills: 3 | Status: SHIPPED | OUTPATIENT
Start: 2020-03-13 | End: 2021-10-04 | Stop reason: CLARIF

## 2020-03-25 ENCOUNTER — TELEPHONE (OUTPATIENT)
Dept: CARDIOLOGY CLINIC | Age: 70
End: 2020-03-25

## 2020-03-25 NOTE — TELEPHONE ENCOUNTER
Carotid studies showed significant stenosis. According to office note, pt has seen Dr. Donta Lagos in past. ? Refer to Dr. Donta Lagos.

## 2020-03-25 NOTE — TELEPHONE ENCOUNTER
Per Dr. Rojelio Sparks. Please have pt f/u with Dr. Regina Meléndez regarding significant blockages in neck.

## 2020-07-16 ENCOUNTER — TELEPHONE (OUTPATIENT)
Dept: NEPHROLOGY | Age: 70
End: 2020-07-16

## 2020-07-16 RX ORDER — POTASSIUM CHLORIDE 20 MEQ/1
20 TABLET, EXTENDED RELEASE ORAL DAILY
Qty: 90 TABLET | Refills: 1 | Status: SHIPPED | OUTPATIENT
Start: 2020-07-16 | End: 2021-10-04 | Stop reason: CLARIF

## 2020-07-16 NOTE — PROGRESS NOTES
Called by the lab with a panic value of potassium of 2.7. Patient was told to increase potassium in her diet. She can drink orange juice. And eat a banana every day. Patient was told to come see me next week. We will repeat her potassium again.       Time spent 15 minutes    Ruddy Moran MD

## 2021-09-10 ENCOUNTER — APPOINTMENT (OUTPATIENT)
Dept: GENERAL RADIOLOGY | Age: 71
End: 2021-09-10
Payer: MEDICARE

## 2021-09-10 ENCOUNTER — HOSPITAL ENCOUNTER (EMERGENCY)
Age: 71
Discharge: ANOTHER HOSPITAL | End: 2021-09-11
Attending: STUDENT IN AN ORGANIZED HEALTH CARE EDUCATION/TRAINING PROGRAM
Payer: MEDICARE

## 2021-09-10 DIAGNOSIS — K94.23 PEG TUBE MALFUNCTION (HCC): Primary | ICD-10-CM

## 2021-09-10 LAB
A/G RATIO: 1.2 (ref 1.1–2.2)
ALBUMIN SERPL-MCNC: 3.8 G/DL (ref 3.4–5)
ALP BLD-CCNC: 122 U/L (ref 40–129)
ALT SERPL-CCNC: 10 U/L (ref 10–40)
ANION GAP SERPL CALCULATED.3IONS-SCNC: 16 MMOL/L (ref 3–16)
AST SERPL-CCNC: 19 U/L (ref 15–37)
BASE EXCESS VENOUS: 3.8 MMOL/L (ref -2–3)
BILIRUB SERPL-MCNC: 0.9 MG/DL (ref 0–1)
BUN BLDV-MCNC: 51 MG/DL (ref 7–20)
CALCIUM SERPL-MCNC: 9.8 MG/DL (ref 8.3–10.6)
CARBOXYHEMOGLOBIN: 1.9 % (ref 0–1.5)
CHLORIDE BLD-SCNC: 93 MMOL/L (ref 99–110)
CO2: 23 MMOL/L (ref 21–32)
CREAT SERPL-MCNC: 2.5 MG/DL (ref 0.6–1.2)
GFR AFRICAN AMERICAN: 23
GFR NON-AFRICAN AMERICAN: 19
GLOBULIN: 3.3 G/DL
GLUCOSE BLD-MCNC: 202 MG/DL (ref 70–99)
HCO3 VENOUS: 29.5 MMOL/L (ref 24–28)
HEMOGLOBIN, VEN, REDUCED: 45.5 %
LACTIC ACID: 1.9 MMOL/L (ref 0.4–2)
LIPASE: 24 U/L (ref 13–60)
METHEMOGLOBIN VENOUS: 0.3 % (ref 0–1.5)
O2 SAT, VEN: 54 %
PCO2, VEN: 48.7 MMHG (ref 41–51)
PH VENOUS: 7.39 (ref 7.35–7.45)
PO2, VEN: 32.2 MMHG (ref 25–40)
POTASSIUM REFLEX MAGNESIUM: 4.4 MMOL/L (ref 3.5–5.1)
SODIUM BLD-SCNC: 132 MMOL/L (ref 136–145)
TCO2 CALC VENOUS: 31 MMOL/L
TOTAL PROTEIN: 7.1 G/DL (ref 6.4–8.2)

## 2021-09-10 PROCEDURE — 82803 BLOOD GASES ANY COMBINATION: CPT

## 2021-09-10 PROCEDURE — 71045 X-RAY EXAM CHEST 1 VIEW: CPT

## 2021-09-10 PROCEDURE — 83605 ASSAY OF LACTIC ACID: CPT

## 2021-09-10 PROCEDURE — 83690 ASSAY OF LIPASE: CPT

## 2021-09-10 PROCEDURE — 85025 COMPLETE CBC W/AUTO DIFF WBC: CPT

## 2021-09-10 PROCEDURE — 74019 RADEX ABDOMEN 2 VIEWS: CPT

## 2021-09-10 PROCEDURE — 80053 COMPREHEN METABOLIC PANEL: CPT

## 2021-09-10 PROCEDURE — 36415 COLL VENOUS BLD VENIPUNCTURE: CPT

## 2021-09-10 PROCEDURE — 99284 EMERGENCY DEPT VISIT MOD MDM: CPT

## 2021-09-11 VITALS
SYSTOLIC BLOOD PRESSURE: 123 MMHG | OXYGEN SATURATION: 95 % | TEMPERATURE: 98.5 F | RESPIRATION RATE: 24 BRPM | DIASTOLIC BLOOD PRESSURE: 52 MMHG | HEART RATE: 104 BPM

## 2021-09-11 LAB
ATYPICAL LYMPHOCYTE RELATIVE PERCENT: 8 % (ref 0–6)
BANDED NEUTROPHILS RELATIVE PERCENT: 2 % (ref 0–7)
BASOPHILS ABSOLUTE: 0 K/UL (ref 0–0.2)
BASOPHILS RELATIVE PERCENT: 0 %
EOSINOPHILS ABSOLUTE: 0 K/UL (ref 0–0.6)
EOSINOPHILS RELATIVE PERCENT: 0 %
HCT VFR BLD CALC: 34.2 % (ref 36–48)
HEMOGLOBIN: 11.3 G/DL (ref 12–16)
LYMPHOCYTES ABSOLUTE: 1.9 K/UL (ref 1–5.1)
LYMPHOCYTES RELATIVE PERCENT: 7 %
MCH RBC QN AUTO: 31.6 PG (ref 26–34)
MCHC RBC AUTO-ENTMCNC: 33 G/DL (ref 31–36)
MCV RBC AUTO: 95.7 FL (ref 80–100)
MONOCYTES ABSOLUTE: 0.6 K/UL (ref 0–1.3)
MONOCYTES RELATIVE PERCENT: 5 %
NEUTROPHILS ABSOLUTE: 10.1 K/UL (ref 1.7–7.7)
NEUTROPHILS RELATIVE PERCENT: 77 %
PDW BLD-RTO: 18.9 % (ref 12.4–15.4)
PLATELET # BLD: 136 K/UL (ref 135–450)
PLATELET SLIDE REVIEW: ABNORMAL
PMV BLD AUTO: 10 FL (ref 5–10.5)
PROMYELOCYTES PERCENT: 1 %
RBC # BLD: 3.58 M/UL (ref 4–5.2)
RBC # BLD: NORMAL 10*6/UL
WBC # BLD: 12.6 K/UL (ref 4–11)

## 2021-09-11 RX ORDER — SODIUM CHLORIDE 9 MG/ML
INJECTION, SOLUTION INTRAVENOUS CONTINUOUS
Status: ACTIVE | OUTPATIENT
Start: 2021-09-11 | End: 2021-09-11

## 2021-09-11 NOTE — ED NOTES
Called [address] [place] [address] to give report-state they have received report already     June Rolle RN  09/11/21 0504

## 2021-09-11 NOTE — ED NOTES
Bed: B26-26  Expected date:   Expected time:   Means of arrival:   Comments:  [address], RN  09/10/21 0563

## 2021-09-11 NOTE — ED PROVIDER NOTES
[address] [facility] [address] ENCOUNTER          ATTENDING PHYSICIAN NOTE       Date of evaluation: 9/10/2021    Chief Complaint     Other      History of Present Illness     Parker George is a 80 y.o. female who presents with PEG tube dislodgement    Patient arrives from [address] [place] [address] where she is only been present for about 10 hours. They state that she has had her mental status baseline per report from the prior facility, as well as has been at her stable mental status throughout her stay at the facility. The patient was found with a PEG tube dislodged, which was reportedly next her in bed. I spoke with the nursing staff at the facility who confirmed she is at her mental status baseline and the only concern is that she does not have access to feeds/meds  I reviewed care everywhere, which indicates that the patient had a new PEG tube placed on the seventh of this month. This would be 3 days ago. However, the documentation is limited as there is no procedure note available to me. I therefore contacted the patient's son and decision maker who confirmed that there was no previous PEG tube, and that this PEG tube was a 3-day old PEG tube. Previous to this she had been fed through an NG tube. PMHx: dementia, prior ExLap, PEG tube placement, and as below  SH: as below    Review of Systems       ROS:      Unable to obtain due to patient condition: dementia       Past Medical, Surgical, Family, and Social History     She has a past medical history of CAD (coronary artery disease), Carotid artery disease ([facility] [address] Diabetes mellitus ([facility] [address] Hyperlipidemia, Lung mass, and Syncope. She has a past surgical history that includes Cardiac surgery (2000); Cholecystectomy; Breast surgery; Coronary artery bypass graft; knee surgery; Upper gastrointestinal endoscopy (N/A, 10/14/2019); Diagnostic Cardiac Cath Lab Procedure; and Coronary angioplasty with stent (2019).   Her family history includes Heart Disease in her father; Stroke in her mother. She reports that she has never smoked. She has never used smokeless tobacco. She reports that she does not drink alcohol and does not use drugs. Medications     Previous Medications    ASPIRIN 81 MG EC TABLET    Take 81 mg by mouth daily    FERROUS SULFATE 324 (65 FE) MG EC TABLET    Take 1 tablet by mouth daily (with breakfast)    GABAPENTIN (NEURONTIN) 100 MG CAPSULE    Take 100 mg by mouth 3 times daily. INSULIN ASPART (NOVOLOG SC)    Inject into the skin    INSULIN GLARGINE (BASAGLAR KWIKPEN SC)    Inject into the skin daily    ISOSORBIDE MONONITRATE (IMDUR) 30 MG EXTENDED RELEASE TABLET    Take 0.5 tablets by mouth daily    LOPERAMIDE (IMODIUM) 2 MG CAPSULE    Take 2 mg by mouth 4 times daily as needed for Diarrhea    METOCLOPRAMIDE (REGLAN) 5 MG/5ML SOLUTION    Take 5 mg by mouth 4 times daily (before meals and nightly)    METOPROLOL SUCCINATE (TOPROL XL) 25 MG EXTENDED RELEASE TABLET    Take 0.5 tablets by mouth daily    MIRTAZAPINE (REMERON PO)    Take 15 mg by mouth nightly     NITROGLYCERIN (NITROSTAT) 0.4 MG SL TABLET    Place 1 tablet under the tongue every 5 minutes as needed for Chest pain    ONDANSETRON (ZOFRAN-ODT) 4 MG DISINTEGRATING TABLET    Take 4 mg by mouth every 8 hours as needed for Nausea or Vomiting    OXYCODONE-ACETAMINOPHEN (PERCOCET) 5-325 MG PER TABLET    Take 1 tablet by mouth 2 times daily as needed. PAROXETINE (PAXIL) 20 MG TABLET    Take 20 mg by mouth every morning    POTASSIUM CHLORIDE (KLOR-CON M) 20 MEQ EXTENDED RELEASE TABLET    Take 1 tablet by mouth daily    POTASSIUM CHLORIDE (KLOR-CON) 10 MEQ EXTENDED RELEASE TABLET    Take 10 mEq by mouth daily    ROSUVASTATIN (CRESTOR) 40 MG TABLET    Take 1 tablet by mouth daily    TICAGRELOR (BRILINTA) 90 MG TABS TABLET    Take 1 tablet by mouth 2 times daily       Allergies     She is allergic to iodine.     Physical Exam     INITIAL VITALS: BP: 94/79, Temp: 98.5 °F (36.9 °C), Pulse: 87, Resp: 18, SpO2: 98 %     General:  WD WN, No acute distress. Non-toxic appearing    Eyes:  Pupils equally round, reactive, brisk. No discharge from eyes. ENT:  No discharge from nose. OP clear. Neck:  Supple. Nontender. Pulmonary:   Non-labored breathing. Breath sounds clear bilaterally. Cardiac:  Regular rate and rhythm. No murmurs. Abdomen:  Soft. Non-tender. Non-distended. No masses. There is what appears to be a PEG site relatively midline without any discharge. There also appears to be a healing laparotomy wound with an area at the superior aspect that appears somewhat abraded and with granulation tissue present. There is no overlying erythema or purulence. Musculoskeletal:  No long bone deformity. No ankle or wrist deformity. Vascular:  Extremities warm and perfused. Radial pulses 2+ bilaterally. Skin:  No rash. Warm. Neuro: Alert and oriented to self. Speech without aphasia or dysarthria. Comments are not appropriate to conversation but consistent with situation (\"I'm leaving\" and \"Ok, you can do that\")  5/5 strength in all 4 extremities by finger  and ankle dorsi/plantar flexion. Sensation grossly intact to light touch. Extremities:  No peripheral edema. LE symmetric. Diagnostic Results     EKG   None    RADIOLOGY:  XR ABDOMEN (2 VIEWS)   Final Result   1. Normal bowel gas pattern      XR CHEST 1 VIEW   Final Result   1. Low lung volumes with basilar atelectasis   2.  Right internal jugular dialysis catheter          LABS:   Results for orders placed or performed during the hospital encounter of 09/10/21   CBC Auto Differential   Result Value Ref Range    WBC 12.6 (H) 4.0 - 11.0 K/uL    RBC 3.58 (L) 4.00 - 5.20 M/uL    Hemoglobin 11.3 (L) 12.0 - 16.0 g/dL    Hematocrit 34.2 (L) 36.0 - 48.0 %    MCV 95.7 80.0 - 100.0 fL    MCH 31.6 26.0 - 34.0 pg    MCHC 33.0 31.0 - 36.0 g/dL    RDW 18.9 (H) 12.4 - 15.4 %    Platelets 760 400 - 189 K/uL    MPV 10.0 5.0 - 10.5 fL PLATELET SLIDE REVIEW Decreased     Neutrophils % 77.0 %    Lymphocytes % 7.0 %    Monocytes % 5.0 %    Eosinophils % 0.0 %    Basophils % 0.0 %    Neutrophils Absolute 10.1 (H) 1.7 - 7.7 K/uL    Lymphocytes Absolute 1.9 1.0 - 5.1 K/uL    Monocytes Absolute 0.6 0.0 - 1.3 K/uL    Eosinophils Absolute 0.0 0.0 - 0.6 K/uL    Basophils Absolute 0.0 0.0 - 0.2 K/uL    Bands Relative 2 0 - 7 %    Atypical Lymphocytes Relative 8 (H) 0 - 6 %    Promyelocytes Percent 1 (A) %    RBC Morphology Normal    Comprehensive Metabolic Panel w/ Reflex to MG   Result Value Ref Range    Sodium 132 (L) 136 - 145 mmol/L    Potassium reflex Magnesium 4.4 3.5 - 5.1 mmol/L    Chloride 93 (L) 99 - 110 mmol/L    CO2 23 21 - 32 mmol/L    Anion Gap 16 3 - 16    Glucose 202 (H) 70 - 99 mg/dL    BUN 51 (H) 7 - 20 mg/dL    CREATININE 2.5 (H) 0.6 - 1.2 mg/dL    GFR Non- 19 (A) >60    GFR  23 (A) >60    Calcium 9.8 8.3 - 10.6 mg/dL    Total Protein 7.1 6.4 - 8.2 g/dL    Albumin 3.8 3.4 - 5.0 g/dL    Albumin/Globulin Ratio 1.2 1.1 - 2.2    Total Bilirubin 0.9 0.0 - 1.0 mg/dL    Alkaline Phosphatase 122 40 - 129 U/L    ALT 10 10 - 40 U/L    AST 19 15 - 37 U/L    Globulin 3.3 g/dL   Lipase   Result Value Ref Range    Lipase 24.0 13.0 - 60.0 U/L   Blood Gas, Venous   Result Value Ref Range    pH, Jd 7.391 7.350 - 7.450    pCO2, Jd 48.7 41.0 - 51.0 mmHg    pO2, Jd 32.2 25 - 40 mmHg    HCO3, Venous 29.5 (H) 24.0 - 28.0 mmol/L    Base Excess, Jd 3.8 (H) -2.0 - 3.0 mmol/L    O2 Sat, Jd 54 Not established %    Carboxyhemoglobin 1.9 (H) 0.0 - 1.5 %    MetHgb, Jd 0.3 0.0 - 1.5 %    TC02 (Calc), Jd 31 mmol/L    Hemoglobin, Jd, Reduced 45.50 %   Lactic Acid, Plasma   Result Value Ref Range    Lactic Acid 1.9 0.4 - 2.0 mmol/L       ED BEDSIDE ULTRASOUND:  None performed    Procedures     None performed    ED Course     Nursing Notes, Past Medical Hx, Past Surgical Hx, Social Hx, Allergies, and Family Hx were reviewed. The patient was given the following medications:  Orders Placed This Encounter   Medications    0.9 % sodium chloride infusion       CONSULTS:  IP CONSULT TO GENERAL SURGERY  IP CONSULT TO GENERAL SURGERY  IP CONSULT TO [address] [place] [address] DECISIONMAKING / ASSESSMENT / Eva Kristina is a 80 y.o. female with PEG tube dislodgement. Pt was hemodynamically stable and afebrile in the Emergency Department. I discussed the patient with the covering physician for Dr. Vicente Burleson replaced the PEG tube 3 days ago. They recommended attempting to replace the PEG tube and there. However, I do not have a procedure note and do not know the size that would be appropriate. Moreover, with a fresh tract I felt that the risks of creating a false passage or potentially creating significant injury to the abdominal wall relatively high, particularly with the proximity to the existing laparotomy wound and its overall poor appearance. Given this the covering physician recommended that I obtain consultation with our local surgeons. I did so and the recommendation to me was that the patient be transferred for reassessment by the original proceduralist without any attempted placing a PEG tube here in the emergency department. I confirmed with the patient's son, who is listed as her emergent contact and decision making on paperwork from the nursing facility, that his preference would be for his mother to be seen by the original team and that he was in favor of transfer at patient preference. I discussed the patient again with covering physician Dr. Anson Keller who accepted the patient in transfer to [facility]. I also discussed the patient with the covering hospitalist Ms. Flor STACK who accepted the patient for transfer to [address] [place] [address] and confirmed that this was the proper location for the patient to be transferred to.   I provided a small fluid bolus given the patient

## 2021-09-11 NOTE — CONSULTS
General Surgery   Resident Consult Note    Reason for Consult: PEG tube problem    History of Present Illness: Patient has baseline dementia and thus much of the information obtained in this HPI was obtained from the patient's EMR. Jovanna Valdivia is a 80 y.o. female with Hx of HTN, GERD, who is status post an exploratory laparotomy at [address] [facility] [address] (7/7/21), the patient was discharged to an [facility] later in July and has been staying at the [facility] since her surgery in July. She was recently discharged from the [facility] to a SNF. During her stay at the [facility], the patient was receiving nutrition from an NG tube. According to care everywhere it appears the patient had a procedure on 9/7/21 to place a new PEG tube in exchange for NG tube for nutrition. When the patient was in the SNF today, her new PEG tube was dislodged. The patient is unable to answer any questions as she has baseline dementia. Past Medical History:        Diagnosis Date    CAD (coronary artery disease)     s/p CABG 2000. Cath 9/10 1/3 grafts occluded. Patent LIMA-LAD, patent SVG-OM, 60% distal LAD lesion distal to anastamosis, occluded SVG-LCX,, 70% prox native LCx, diffuse RCA dz. Declines redo CABG.   will consider PCI of LAD and LCX    Carotid artery disease ([facility] [address]     Diabetes mellitus ([facility] [address]     followed by PCP    Hyperlipidemia     rec semi annual lipids w/ LDL goal <70    Lung mass     likely benign    Syncope     secondary to medications       Past Surgical History:        Procedure Laterality Date    BREAST SURGERY      left breast tumor removed    CARDIAC SURGERY  2000    CABG    CHOLECYSTECTOMY      CORONARY ANGIOPLASTY WITH STENT PLACEMENT  2019    CORONARY ARTERY BYPASS GRAFT      DIAGNOSTIC CARDIAC CATH LAB PROCEDURE      KNEE SURGERY      UPPER GASTROINTESTINAL ENDOSCOPY N/A 10/14/2019    EGD DIAGNOSTIC ONLY performed by Thomas Green MD at [facility]:  Iodine    Medications:   Home Meds  No current facility-administered medications on file prior to encounter. Current Outpatient Medications on File Prior to Encounter   Medication Sig Dispense Refill    potassium chloride (KLOR-CON M) 20 MEQ extended release tablet Take 1 tablet by mouth daily 90 tablet 1    isosorbide mononitrate (IMDUR) 30 MG extended release tablet Take 0.5 tablets by mouth daily 45 tablet 3    metoprolol succinate (TOPROL XL) 25 MG extended release tablet Take 0.5 tablets by mouth daily 90 tablet 3    ticagrelor (BRILINTA) 90 MG TABS tablet Take 1 tablet by mouth 2 times daily 180 tablet 3    rosuvastatin (CRESTOR) 40 MG tablet Take 1 tablet by mouth daily 90 tablet 3    Insulin Aspart (NOVOLOG SC) Inject into the skin      ondansetron (ZOFRAN-ODT) 4 MG disintegrating tablet Take 4 mg by mouth every 8 hours as needed for Nausea or Vomiting      loperamide (IMODIUM) 2 MG capsule Take 2 mg by mouth 4 times daily as needed for Diarrhea      metoclopramide (REGLAN) 5 MG/5ML solution Take 5 mg by mouth 4 times daily (before meals and nightly)      Insulin Glargine (BASAGLAR KWIKPEN SC) Inject into the skin daily      Mirtazapine (REMERON PO) Take 15 mg by mouth nightly       ferrous sulfate 324 (65 Fe) MG EC tablet Take 1 tablet by mouth daily (with breakfast) 30 tablet 2    aspirin 81 MG EC tablet Take 81 mg by mouth daily      PARoxetine (PAXIL) 20 MG tablet Take 20 mg by mouth every morning      potassium chloride (KLOR-CON) 10 MEQ extended release tablet Take 10 mEq by mouth daily      gabapentin (NEURONTIN) 100 MG capsule Take 100 mg by mouth 3 times daily.  nitroGLYCERIN (NITROSTAT) 0.4 MG SL tablet Place 1 tablet under the tongue every 5 minutes as needed for Chest pain 25 tablet 5    oxycodone-acetaminophen (PERCOCET) 5-325 MG per tablet Take 1 tablet by mouth 2 times daily as needed. Current Meds  No current facility-administered medications for this encounter.       Family History:   Family History   Problem Relation Age of Onset    Stroke Mother     Heart Disease Father        Social History:   TOBACCO:   reports that she has never smoked. She has never used smokeless tobacco.  ETOH:   reports no history of alcohol use. DRUGS:   reports no history of drug use. Review of Systems:   A 14 point review of systems was conducted, significant findings as noted in HPI. All other systems negative. Physical exam:    Vitals:    09/10/21 2225 09/10/21 2230   BP: 94/79 (!) 99/58   Pulse: 87    Resp: 18    Temp: 98.5 °F (36.9 °C)    TempSrc: Oral    SpO2: 98%        General appearance: alert, no acute distress, grooming appropriate  Eyes: No scleral icterus, EOM grossly intact  Neck: trachea midline, no JVD, no lymphadenopathy, neck supple  Chest/Lungs: Normal effort with no accessory muscle use on RA  Cardiovascular: RRR, no murmurs/gallops/rubs, S1 and S2 noted, well perfused  Abdomen: Soft, non-tender, non-distended, no rebound, guarding, or rigidity. Healing midline ex lap wound, with a minimal amount of oozing over the superior portion, ostomy is pink and patent with stool in ostomy appliance, well healed scar over the ostomy with what appears to be a former PEG tube site or port site. In the LUQ of the abdomen is the incision for the PEG tube placement which is hemostatic. Skin: warm and dry, no rashes  Extremities: no edema, no cyanosis  Neuro: Not oriented to person, place or time, no focal deficits, sensation intact    Labs:    CBC:   Recent Labs     09/10/21  2329   WBC 12.6*   HGB 11.3*   HCT 34.2*   MCV 95.7        BMP:   Recent Labs     09/10/21  2329   *   K 4.4   CL 93*   CO2 23   BUN 51*   CREATININE 2.5*     PT/INR: No results for input(s): PROTIME, INR in the last 72 hours. APTT: No results for input(s): APTT in the last 72 hours.   Liver Profile:   Lab Results   Component Value Date    AST 19 09/10/2021    ALT 10 09/10/2021    BILIDIR <0.2 10/10/2019    BILITOT 0.9 09/10/2021    ALKPHOS 122 09/10/2021     Lab Results   Component Value Date    CHOL 201 10/15/2019    HDL 31 10/15/2019    TRIG 125 10/15/2019     UA:   Lab Results   Component Value Date    COLORU YELLOW 10/11/2019    PHUR 5.5 10/11/2019    WBCUA 0-2 10/11/2019    RBCUA 0-2 10/11/2019    CLARITYU Clear 10/11/2019    SPECGRAV 1.014 10/11/2019    LEUKOCYTESUR Negative 10/11/2019    UROBILINOGEN 0.2 10/11/2019    BILIRUBINUR Negative 10/11/2019    BLOODU Negative 10/11/2019    GLUCOSEU 250 10/11/2019       Imaging:   XR ABDOMEN (2 VIEWS)   Final Result   1. Normal bowel gas pattern      XR CHEST 1 VIEW   Final Result   1. Low lung volumes with basilar atelectasis   2. Right internal jugular dialysis catheter            Assessment/Plan: This is a 80 y.o. female with Hx of HTN, HLD, GERD, CAD and T2DM presents to [facility] ED after a PEG tube dislodgement. The patient is unable to answer any questions regarding her history. From gathered notes in care everywhere is appears the patient had an exploratory laparotomy on 7/7/21 for an unknown reason, the patient then had a prolonged course at an [facility] for recovery, where she received nutrition from an NG tube, at which time a PEG tube was placed, presumably on 9/7/21. Once the patient was transferred to the SNF, the PEG tube was completely dislodged. At this time, it is unclear when the PEG tube was placed/replaced as there are no available op notes. It is also unclear as to what size PEG tube was placed. KUB obtained in the ED shows no evidence of pneumoperitoneum. At this time it is recommended that the patient be transferred to [facility] for possible PEG tube replacement. Patient will be discussed with Dr. Cintia Hackett and plan was discussed with Dr. Lucille Mathews. Please call with questions.      Georgia Del Rio DO  09/11/21  12:23 AM

## 2021-10-04 ENCOUNTER — APPOINTMENT (OUTPATIENT)
Dept: GENERAL RADIOLOGY | Age: 71
DRG: 312 | End: 2021-10-04
Payer: MEDICARE

## 2021-10-04 ENCOUNTER — HOSPITAL ENCOUNTER (INPATIENT)
Age: 71
LOS: 1 days | Discharge: NURSING, REHAB OR LONG-TERM CARE | DRG: 312 | End: 2021-10-07
Attending: EMERGENCY MEDICINE | Admitting: INTERNAL MEDICINE
Payer: MEDICARE

## 2021-10-04 DIAGNOSIS — T82.9XXA COMPLICATION ASSOCIATED WITH DIALYSIS CATHETER: Primary | ICD-10-CM

## 2021-10-04 LAB
ANION GAP SERPL CALCULATED.3IONS-SCNC: 12 MMOL/L (ref 3–16)
BASOPHILS ABSOLUTE: 0.1 K/UL (ref 0–0.2)
BASOPHILS RELATIVE PERCENT: 0.9 %
BUN BLDV-MCNC: 93 MG/DL (ref 7–20)
CALCIUM SERPL-MCNC: 9.6 MG/DL (ref 8.3–10.6)
CHLORIDE BLD-SCNC: 112 MMOL/L (ref 99–110)
CO2: 24 MMOL/L (ref 21–32)
CREAT SERPL-MCNC: 2.3 MG/DL (ref 0.6–1.2)
EOSINOPHILS ABSOLUTE: 0.3 K/UL (ref 0–0.6)
EOSINOPHILS RELATIVE PERCENT: 2.3 %
GFR AFRICAN AMERICAN: 25
GFR NON-AFRICAN AMERICAN: 21
GLUCOSE BLD-MCNC: 191 MG/DL (ref 70–99)
GLUCOSE BLD-MCNC: 230 MG/DL (ref 70–99)
HCT VFR BLD CALC: 28.4 % (ref 36–48)
HEMOGLOBIN: 9.2 G/DL (ref 12–16)
LYMPHOCYTES ABSOLUTE: 1.4 K/UL (ref 1–5.1)
LYMPHOCYTES RELATIVE PERCENT: 11.7 %
MCH RBC QN AUTO: 33.2 PG (ref 26–34)
MCHC RBC AUTO-ENTMCNC: 32.3 G/DL (ref 31–36)
MCV RBC AUTO: 102.8 FL (ref 80–100)
MONOCYTES ABSOLUTE: 1.2 K/UL (ref 0–1.3)
MONOCYTES RELATIVE PERCENT: 10.6 %
NEUTROPHILS ABSOLUTE: 8.7 K/UL (ref 1.7–7.7)
NEUTROPHILS RELATIVE PERCENT: 74.5 %
PDW BLD-RTO: 21.9 % (ref 12.4–15.4)
PERFORMED ON: ABNORMAL
PLATELET # BLD: 137 K/UL (ref 135–450)
PMV BLD AUTO: 10.7 FL (ref 5–10.5)
POTASSIUM REFLEX MAGNESIUM: 5.1 MMOL/L (ref 3.5–5.1)
RBC # BLD: 2.77 M/UL (ref 4–5.2)
SODIUM BLD-SCNC: 148 MMOL/L (ref 136–145)
WBC # BLD: 11.6 K/UL (ref 4–11)

## 2021-10-04 PROCEDURE — G0378 HOSPITAL OBSERVATION PER HR: HCPCS

## 2021-10-04 PROCEDURE — 36415 COLL VENOUS BLD VENIPUNCTURE: CPT

## 2021-10-04 PROCEDURE — 80048 BASIC METABOLIC PNL TOTAL CA: CPT

## 2021-10-04 PROCEDURE — 71045 X-RAY EXAM CHEST 1 VIEW: CPT

## 2021-10-04 PROCEDURE — 6370000000 HC RX 637 (ALT 250 FOR IP): Performed by: INTERNAL MEDICINE

## 2021-10-04 PROCEDURE — 2580000003 HC RX 258: Performed by: INTERNAL MEDICINE

## 2021-10-04 PROCEDURE — 85025 COMPLETE CBC W/AUTO DIFF WBC: CPT

## 2021-10-04 PROCEDURE — 99285 EMERGENCY DEPT VISIT HI MDM: CPT

## 2021-10-04 RX ORDER — ONDANSETRON 2 MG/ML
4 INJECTION INTRAMUSCULAR; INTRAVENOUS EVERY 6 HOURS PRN
Status: DISCONTINUED | OUTPATIENT
Start: 2021-10-04 | End: 2021-10-07 | Stop reason: HOSPADM

## 2021-10-04 RX ORDER — SODIUM CHLORIDE 0.9 % (FLUSH) 0.9 %
5-40 SYRINGE (ML) INJECTION EVERY 12 HOURS SCHEDULED
Status: DISCONTINUED | OUTPATIENT
Start: 2021-10-04 | End: 2021-10-07 | Stop reason: HOSPADM

## 2021-10-04 RX ORDER — GABAPENTIN 100 MG/1
100 CAPSULE ORAL 3 TIMES DAILY
Status: DISCONTINUED | OUTPATIENT
Start: 2021-10-04 | End: 2021-10-04

## 2021-10-04 RX ORDER — VALPROIC ACID 250 MG/5ML
130 SOLUTION ORAL 3 TIMES DAILY
COMMUNITY

## 2021-10-04 RX ORDER — INSULIN LISPRO 100 [IU]/ML
0-12 INJECTION, SOLUTION INTRAVENOUS; SUBCUTANEOUS
Status: DISCONTINUED | OUTPATIENT
Start: 2021-10-05 | End: 2021-10-07 | Stop reason: HOSPADM

## 2021-10-04 RX ORDER — DEXTROSE MONOHYDRATE 25 G/50ML
12.5 INJECTION, SOLUTION INTRAVENOUS PRN
Status: DISCONTINUED | OUTPATIENT
Start: 2021-10-04 | End: 2021-10-07 | Stop reason: HOSPADM

## 2021-10-04 RX ORDER — OLANZAPINE 5 MG/1
10 TABLET ORAL NIGHTLY
Status: DISCONTINUED | OUTPATIENT
Start: 2021-10-04 | End: 2021-10-07 | Stop reason: HOSPADM

## 2021-10-04 RX ORDER — NICOTINE POLACRILEX 4 MG
15 LOZENGE BUCCAL PRN
Status: DISCONTINUED | OUTPATIENT
Start: 2021-10-04 | End: 2021-10-07 | Stop reason: HOSPADM

## 2021-10-04 RX ORDER — ASPIRIN 81 MG/1
81 TABLET, CHEWABLE ORAL DAILY
Status: DISCONTINUED | OUTPATIENT
Start: 2021-10-05 | End: 2021-10-07 | Stop reason: HOSPADM

## 2021-10-04 RX ORDER — ASPIRIN 81 MG/1
81 TABLET, CHEWABLE ORAL DAILY
COMMUNITY

## 2021-10-04 RX ORDER — ATORVASTATIN CALCIUM 80 MG/1
80 TABLET, FILM COATED ORAL NIGHTLY
Status: DISCONTINUED | OUTPATIENT
Start: 2021-10-04 | End: 2021-10-07 | Stop reason: HOSPADM

## 2021-10-04 RX ORDER — FAMOTIDINE 10 MG
10 TABLET ORAL DAILY
COMMUNITY
Start: 2021-09-24 | End: 2021-10-24

## 2021-10-04 RX ORDER — SODIUM CHLORIDE 9 MG/ML
25 INJECTION, SOLUTION INTRAVENOUS PRN
Status: DISCONTINUED | OUTPATIENT
Start: 2021-10-04 | End: 2021-10-07 | Stop reason: HOSPADM

## 2021-10-04 RX ORDER — ONDANSETRON 4 MG/1
4 TABLET, ORALLY DISINTEGRATING ORAL EVERY 8 HOURS PRN
Status: DISCONTINUED | OUTPATIENT
Start: 2021-10-04 | End: 2021-10-07 | Stop reason: HOSPADM

## 2021-10-04 RX ORDER — MIDODRINE HYDROCHLORIDE 5 MG/1
10 TABLET ORAL
Status: DISCONTINUED | OUTPATIENT
Start: 2021-10-05 | End: 2021-10-07 | Stop reason: HOSPADM

## 2021-10-04 RX ORDER — ACETAMINOPHEN 325 MG/1
650 TABLET ORAL EVERY 4 HOURS PRN
COMMUNITY

## 2021-10-04 RX ORDER — FAMOTIDINE 20 MG/1
10 TABLET, FILM COATED ORAL DAILY
Status: DISCONTINUED | OUTPATIENT
Start: 2021-10-05 | End: 2021-10-07 | Stop reason: HOSPADM

## 2021-10-04 RX ORDER — B-COMPLEX WITH VITAMIN C
1 TABLET ORAL DAILY
Status: DISCONTINUED | OUTPATIENT
Start: 2021-10-05 | End: 2021-10-04

## 2021-10-04 RX ORDER — INSULIN GLARGINE 100 [IU]/ML
27 INJECTION, SOLUTION SUBCUTANEOUS 2 TIMES DAILY
Status: ON HOLD | COMMUNITY
End: 2021-10-21 | Stop reason: SDUPTHER

## 2021-10-04 RX ORDER — SODIUM CHLORIDE 0.9 % (FLUSH) 0.9 %
5-40 SYRINGE (ML) INJECTION PRN
Status: DISCONTINUED | OUTPATIENT
Start: 2021-10-04 | End: 2021-10-07 | Stop reason: HOSPADM

## 2021-10-04 RX ORDER — MIDODRINE HYDROCHLORIDE 10 MG/1
10 TABLET ORAL 3 TIMES DAILY
Status: ON HOLD | COMMUNITY
End: 2021-10-21 | Stop reason: HOSPADM

## 2021-10-04 RX ORDER — INSULIN LISPRO 100 [IU]/ML
0-6 INJECTION, SOLUTION INTRAVENOUS; SUBCUTANEOUS NIGHTLY
Status: DISCONTINUED | OUTPATIENT
Start: 2021-10-04 | End: 2021-10-07 | Stop reason: HOSPADM

## 2021-10-04 RX ORDER — DEXTROSE MONOHYDRATE 50 MG/ML
100 INJECTION, SOLUTION INTRAVENOUS PRN
Status: DISCONTINUED | OUTPATIENT
Start: 2021-10-04 | End: 2021-10-07 | Stop reason: HOSPADM

## 2021-10-04 RX ORDER — INSULIN GLARGINE 100 [IU]/ML
27 INJECTION, SOLUTION SUBCUTANEOUS 2 TIMES DAILY
Status: DISCONTINUED | OUTPATIENT
Start: 2021-10-04 | End: 2021-10-04 | Stop reason: SDUPTHER

## 2021-10-04 RX ORDER — ACETAMINOPHEN 325 MG/1
650 TABLET ORAL EVERY 6 HOURS PRN
Status: DISCONTINUED | OUTPATIENT
Start: 2021-10-04 | End: 2021-10-07 | Stop reason: HOSPADM

## 2021-10-04 RX ORDER — VALPROIC ACID 250 MG/5ML
130 SOLUTION ORAL 3 TIMES DAILY
Status: DISCONTINUED | OUTPATIENT
Start: 2021-10-04 | End: 2021-10-07 | Stop reason: HOSPADM

## 2021-10-04 RX ORDER — ACETAMINOPHEN 650 MG/1
650 SUPPOSITORY RECTAL EVERY 6 HOURS PRN
Status: DISCONTINUED | OUTPATIENT
Start: 2021-10-04 | End: 2021-10-07 | Stop reason: HOSPADM

## 2021-10-04 RX ORDER — OLANZAPINE 10 MG/1
10 TABLET ORAL NIGHTLY
COMMUNITY

## 2021-10-04 RX ORDER — POLYETHYLENE GLYCOL 3350 17 G/17G
17 POWDER, FOR SOLUTION ORAL DAILY PRN
Status: DISCONTINUED | OUTPATIENT
Start: 2021-10-04 | End: 2021-10-07 | Stop reason: HOSPADM

## 2021-10-04 RX ORDER — ATORVASTATIN CALCIUM 80 MG/1
80 TABLET, FILM COATED ORAL NIGHTLY
COMMUNITY

## 2021-10-04 RX ADMIN — OLANZAPINE 10 MG: 5 TABLET, FILM COATED ORAL at 23:48

## 2021-10-04 RX ADMIN — VALPROIC ACID 130 MG: 250 SOLUTION ORAL at 23:48

## 2021-10-04 RX ADMIN — INSULIN GLARGINE 27 UNITS: 100 INJECTION, SOLUTION SUBCUTANEOUS at 23:49

## 2021-10-04 RX ADMIN — Medication 10 ML: at 23:48

## 2021-10-04 RX ADMIN — ATORVASTATIN CALCIUM 80 MG: 80 TABLET, FILM COATED ORAL at 23:48

## 2021-10-04 RX ADMIN — INSULIN LISPRO 2 UNITS: 100 INJECTION, SOLUTION INTRAVENOUS; SUBCUTANEOUS at 23:50

## 2021-10-04 ASSESSMENT — PAIN SCALES - PAIN ASSESSMENT IN ADVANCED DEMENTIA (PAINAD)
BREATHING: 0
TOTALSCORE: 0
NEGVOCALIZATION: 0
CONSOLABILITY: 0
FACIALEXPRESSION: 0
BODYLANGUAGE: 0

## 2021-10-04 NOTE — ED NOTES
Home Medication List is complete. Source of medications in list is [facility]. Patient's RN states that she did receive morning medications today. Also states that all oral meds are given via G tube. Please note:    · Levofloxacin course, started 9/24 x 3 doses (every 48 hrs), is complete per RN.       Changes made to the Home Medication List:   Removed:    Rosuvastatin   Ticagrelor   Potassium chloride   Paroxetine   Ondansetron prn   Percocet prn   Mirtazepine   Metoprolol succinate   Imdur   Loperamide prn   Metoclopramide   NTG prn   Ferrous sulfate    Added:   APAP prn   Glargine insulin   Olanzapine   Midodrine   Atorvastatin   Valproic acid   B complex    Changes:    Lispro insulin -- [facility] to q6h [facility] Modesta CarlosD., [facility]   10/4/2021 5:06 PM  Wireless: [phone]

## 2021-10-04 NOTE — ED PROVIDER NOTES
[address] [facility] [address] ENCOUNTER          PHYSICIAN ASSISTANT NOTE       Date of evaluation: 10/4/2021    Chief Complaint     Other (dialysis catheter clogged)    History of Present Illness     Valeriy Lopez is a 80 y.o. female with a past medical history of stroke, coronary artery disease, hypertension, hypercholesterolemia, G-tube in place, n.p.o. with colostomy, end-stage renal disease on dialysis Monday, Wednesday and Friday presenting to the emergency department for clogged dialysis catheter. Per report from nursing staff at dialysis center, they tried Cathflo 3 times with no success. Last dialysis session was on Friday. Patient is not able to contribute to history due to baseline mental status. Review of Systems     Review of Systems   Unable to perform ROS: Dementia       Past Medical, Surgical, Family, and Social History     She has a past medical history of CAD (coronary artery disease), Carotid artery disease ([address] Diabetes mellitus ([facility] [address] Hyperlipidemia, Lung mass, and Syncope. She has a past surgical history that includes Cardiac surgery (2000); Cholecystectomy; Breast surgery; Coronary artery bypass graft; knee surgery; Upper gastrointestinal endoscopy (N/A, 10/14/2019); Diagnostic Cardiac Cath Lab Procedure; and Coronary angioplasty with stent (2019). Her family history includes Heart Disease in her father; Stroke in her mother. She reports that she has never smoked. She has never used smokeless tobacco. She reports that she does not drink alcohol and does not use drugs. Medications     Previous Medications    ASPIRIN 81 MG EC TABLET    Take 81 mg by mouth daily    FERROUS SULFATE 324 (65 FE) MG EC TABLET    Take 1 tablet by mouth daily (with breakfast)    GABAPENTIN (NEURONTIN) 100 MG CAPSULE    Take 100 mg by mouth 3 times daily.     INSULIN ASPART (NOVOLOG SC)    Inject into the skin    INSULIN GLARGINE (BASAGLAR KWIKPEN SC)    Inject into the skin daily effort is normal.   Musculoskeletal:         General: Normal range of motion. Neurological:      General: No focal deficit present. Mental Status: She is alert. Psychiatric:         Mood and Affect: Mood normal.         Behavior: Behavior normal.       Diagnostic Results     RADIOLOGY:  XR CHEST PORTABLE   Final Result    Impression: Right basilar somewhat nodular opacity. This is new from prior. LABS:   Results for orders placed or performed during the hospital encounter of 10/04/21   CBC Auto Differential   Result Value Ref Range    WBC 11.6 (H) 4.0 - 11.0 K/uL    RBC 2.77 (L) 4.00 - 5.20 M/uL    Hemoglobin 9.2 (L) 12.0 - 16.0 g/dL    Hematocrit 28.4 (L) 36.0 - 48.0 %    .8 (H) 80.0 - 100.0 fL    MCH 33.2 26.0 - 34.0 pg    MCHC 32.3 31.0 - 36.0 g/dL    RDW 21.9 (H) 12.4 - 15.4 %    Platelets 113 960 - 101 K/uL    MPV 10.7 (H) 5.0 - 10.5 fL    Neutrophils % 74.5 %    Lymphocytes % 11.7 %    Monocytes % 10.6 %    Eosinophils % 2.3 %    Basophils % 0.9 %    Neutrophils Absolute 8.7 (H) 1.7 - 7.7 K/uL    Lymphocytes Absolute 1.4 1.0 - 5.1 K/uL    Monocytes Absolute 1.2 0.0 - 1.3 K/uL    Eosinophils Absolute 0.3 0.0 - 0.6 K/uL    Basophils Absolute 0.1 0.0 - 0.2 K/uL       ED BEDSIDE ULTRASOUND:  None    RECENT VITALS:  BP: 130/62, Temp: 98.1 °F (36.7 °C), Pulse: 94, Resp: 18, SpO2: 97 %     Procedures   None    ED Course     Nursing Notes, Past Medical Hx,Past Surgical Hx, Social Hx, Allergies, and Family Hx were reviewed. The patient was given the following medications:  No orders of the defined types were placed in this encounter. CONSULTS:  IP CONSULT TO NEPHROLOGY  IP CONSULT TO HOSPITALIST    MEDICAL DECISION MAKING / ASSESSMENT / PLAN     Corrie Chamorro is a 80 y.o. female presenting to the emergency department for concerns of malfunctioning central hemodialysis catheter. Patient is nonverbal and unable to contribute to history.   According to report they attempted to flush the hemodialysis catheter with Cathflo 3 times and were unsuccessful. She has not had dialysis since Friday. Upon presentation vitals were stable and she was afebrile, oxygen saturations 97% on room air. Chest x-ray showed no pulmonary edema. Discussed with Dr. Tabares Dears of nephrology who recommended admission to have dialysis catheter replaced tomorrow. Will address hyperkalemia if it is greater than 6 per nephrology recommendations. Patient will be monitored in the ED until she is moved to her new treatment location. This patient was also evaluated by the attending physician. All care plans were discussed and agreed upon. Clinical Impression     1. Complication associated with dialysis catheter      Disposition     PATIENT REFERRED TO:  No follow-up provider specified.     DISCHARGE MEDICATIONS:  New Prescriptions    No medications on file       DISPOSITION Decision To Admit 10/04/2021 04:26:57 PM        Ira Maldonado PA-C  10/04/21 [id number]

## 2021-10-04 NOTE — ED PROVIDER NOTES
ED Attending Attestation Note     Date of evaluation: 10/4/2021    This patient was seen by the advance practice provider. I have seen and examined the patient, agree with the workup, evaluation, management and diagnosis. The care plan has been discussed. My assessment reveals patient with clogged trialysis catheter, missed dialysis, and unable to be cleared with cath-lauren. Patient will be admitted to monitor for increasing potassium level and plan for catheter change tomorrow.      Mahi Winchester MD  10/04/21 8900

## 2021-10-05 ENCOUNTER — APPOINTMENT (OUTPATIENT)
Dept: INTERVENTIONAL RADIOLOGY/VASCULAR | Age: 71
DRG: 312 | End: 2021-10-05
Payer: MEDICARE

## 2021-10-05 LAB
ANION GAP SERPL CALCULATED.3IONS-SCNC: 12 MMOL/L (ref 3–16)
BASOPHILS ABSOLUTE: 0 K/UL (ref 0–0.2)
BASOPHILS RELATIVE PERCENT: 0.7 %
BUN BLDV-MCNC: 101 MG/DL (ref 7–20)
CALCIUM SERPL-MCNC: 9.2 MG/DL (ref 8.3–10.6)
CHLORIDE BLD-SCNC: 110 MMOL/L (ref 99–110)
CO2: 24 MMOL/L (ref 21–32)
CREAT SERPL-MCNC: 2.6 MG/DL (ref 0.6–1.2)
EOSINOPHILS ABSOLUTE: 0.1 K/UL (ref 0–0.6)
EOSINOPHILS RELATIVE PERCENT: 1.9 %
GFR AFRICAN AMERICAN: 22
GFR NON-AFRICAN AMERICAN: 18
GLUCOSE BLD-MCNC: 107 MG/DL (ref 70–99)
GLUCOSE BLD-MCNC: 161 MG/DL (ref 70–99)
GLUCOSE BLD-MCNC: 208 MG/DL (ref 70–99)
GLUCOSE BLD-MCNC: 212 MG/DL (ref 70–99)
HBV SURFACE AB TITR SER: <3.5 MIU/ML
HCT VFR BLD CALC: 24.3 % (ref 36–48)
HEMOGLOBIN: 8 G/DL (ref 12–16)
INR BLD: 1.03 (ref 0.88–1.12)
LYMPHOCYTES ABSOLUTE: 1 K/UL (ref 1–5.1)
LYMPHOCYTES RELATIVE PERCENT: 15.7 %
MCH RBC QN AUTO: 33.2 PG (ref 26–34)
MCHC RBC AUTO-ENTMCNC: 32.9 G/DL (ref 31–36)
MCV RBC AUTO: 100.8 FL (ref 80–100)
MONOCYTES ABSOLUTE: 0.8 K/UL (ref 0–1.3)
MONOCYTES RELATIVE PERCENT: 12.9 %
NEUTROPHILS ABSOLUTE: 4.2 K/UL (ref 1.7–7.7)
NEUTROPHILS RELATIVE PERCENT: 68.8 %
PDW BLD-RTO: 22.1 % (ref 12.4–15.4)
PERFORMED ON: ABNORMAL
PLATELET # BLD: 130 K/UL (ref 135–450)
PMV BLD AUTO: 10.7 FL (ref 5–10.5)
POTASSIUM REFLEX MAGNESIUM: 5.3 MMOL/L (ref 3.5–5.1)
PROTHROMBIN TIME: 11.7 SEC (ref 9.9–12.7)
RBC # BLD: 2.41 M/UL (ref 4–5.2)
SODIUM BLD-SCNC: 146 MMOL/L (ref 136–145)
WBC # BLD: 6.1 K/UL (ref 4–11)

## 2021-10-05 PROCEDURE — 6370000000 HC RX 637 (ALT 250 FOR IP): Performed by: INTERNAL MEDICINE

## 2021-10-05 PROCEDURE — 6360000002 HC RX W HCPCS: Performed by: INTERNAL MEDICINE

## 2021-10-05 PROCEDURE — 36415 COLL VENOUS BLD VENIPUNCTURE: CPT

## 2021-10-05 PROCEDURE — 0J2TXYZ CHANGE OTHER DEVICE IN TRUNK SUBCUTANEOUS TISSUE AND FASCIA, EXTERNAL APPROACH: ICD-10-PCS | Performed by: RADIOLOGY

## 2021-10-05 PROCEDURE — 6360000002 HC RX W HCPCS

## 2021-10-05 PROCEDURE — 2500000003 HC RX 250 WO HCPCS

## 2021-10-05 PROCEDURE — 36581 REPLACE TUNNELED CV CATH: CPT | Performed by: RADIOLOGY

## 2021-10-05 PROCEDURE — 77001 FLUOROGUIDE FOR VEIN DEVICE: CPT | Performed by: RADIOLOGY

## 2021-10-05 PROCEDURE — 85025 COMPLETE CBC W/AUTO DIFF WBC: CPT

## 2021-10-05 PROCEDURE — 2580000003 HC RX 258

## 2021-10-05 PROCEDURE — 80048 BASIC METABOLIC PNL TOTAL CA: CPT

## 2021-10-05 PROCEDURE — 85610 PROTHROMBIN TIME: CPT

## 2021-10-05 PROCEDURE — C1769 GUIDE WIRE: HCPCS

## 2021-10-05 PROCEDURE — G0378 HOSPITAL OBSERVATION PER HR: HCPCS

## 2021-10-05 PROCEDURE — 96372 THER/PROPH/DIAG INJ SC/IM: CPT

## 2021-10-05 PROCEDURE — 99223 1ST HOSP IP/OBS HIGH 75: CPT | Performed by: INTERNAL MEDICINE

## 2021-10-05 PROCEDURE — 86706 HEP B SURFACE ANTIBODY: CPT

## 2021-10-05 PROCEDURE — 2580000003 HC RX 258: Performed by: INTERNAL MEDICINE

## 2021-10-05 PROCEDURE — 5A1D70Z PERFORMANCE OF URINARY FILTRATION, INTERMITTENT, LESS THAN 6 HOURS PER DAY: ICD-10-PCS | Performed by: INTERNAL MEDICINE

## 2021-10-05 PROCEDURE — 90935 HEMODIALYSIS ONE EVALUATION: CPT | Performed by: INTERNAL MEDICINE

## 2021-10-05 PROCEDURE — 90935 HEMODIALYSIS ONE EVALUATION: CPT

## 2021-10-05 RX ORDER — HEPARIN SODIUM 1000 [USP'U]/ML
3600 INJECTION, SOLUTION INTRAVENOUS; SUBCUTANEOUS PRN
Status: DISCONTINUED | OUTPATIENT
Start: 2021-10-05 | End: 2021-10-07 | Stop reason: HOSPADM

## 2021-10-05 RX ORDER — ALBUMIN (HUMAN) 12.5 G/50ML
25 SOLUTION INTRAVENOUS PRN
Status: COMPLETED | OUTPATIENT
Start: 2021-10-05 | End: 2021-10-07

## 2021-10-05 RX ORDER — HEPARIN SODIUM 5000 [USP'U]/ML
5000 INJECTION, SOLUTION INTRAVENOUS; SUBCUTANEOUS EVERY 8 HOURS SCHEDULED
Status: DISCONTINUED | OUTPATIENT
Start: 2021-10-05 | End: 2021-10-07 | Stop reason: HOSPADM

## 2021-10-05 RX ORDER — HEPARIN SODIUM 1000 [USP'U]/ML
1000 INJECTION, SOLUTION INTRAVENOUS; SUBCUTANEOUS ONCE
Status: DISCONTINUED | OUTPATIENT
Start: 2021-10-05 | End: 2021-10-05

## 2021-10-05 RX ADMIN — HEPARIN SODIUM 3600 UNITS: 1000 INJECTION INTRAVENOUS; SUBCUTANEOUS at 18:13

## 2021-10-05 RX ADMIN — OLANZAPINE 10 MG: 5 TABLET, FILM COATED ORAL at 22:36

## 2021-10-05 RX ADMIN — VALPROIC ACID 130 MG: 250 SOLUTION ORAL at 09:04

## 2021-10-05 RX ADMIN — Medication 10 ML: at 09:08

## 2021-10-05 RX ADMIN — VALPROIC ACID 130 MG: 250 SOLUTION ORAL at 22:36

## 2021-10-05 RX ADMIN — Medication 1 TABLET: at 09:04

## 2021-10-05 RX ADMIN — ATORVASTATIN CALCIUM 80 MG: 80 TABLET, FILM COATED ORAL at 22:36

## 2021-10-05 RX ADMIN — Medication 10 ML: at 22:14

## 2021-10-05 RX ADMIN — FAMOTIDINE 10 MG: 20 TABLET, FILM COATED ORAL at 09:04

## 2021-10-05 RX ADMIN — ASPIRIN 81 MG: 81 TABLET, CHEWABLE ORAL at 09:04

## 2021-10-05 RX ADMIN — HEPARIN SODIUM 5000 UNITS: 5000 INJECTION INTRAVENOUS; SUBCUTANEOUS at 22:14

## 2021-10-05 RX ADMIN — INSULIN GLARGINE 10 UNITS: 100 INJECTION, SOLUTION SUBCUTANEOUS at 22:15

## 2021-10-05 RX ADMIN — MIDODRINE HYDROCHLORIDE 10 MG: 5 TABLET ORAL at 09:04

## 2021-10-05 RX ADMIN — INSULIN LISPRO 4 UNITS: 100 INJECTION, SOLUTION INTRAVENOUS; SUBCUTANEOUS at 09:08

## 2021-10-05 RX ADMIN — MIDODRINE HYDROCHLORIDE 10 MG: 5 TABLET ORAL at 16:38

## 2021-10-05 ASSESSMENT — PAIN SCALES - PAIN ASSESSMENT IN ADVANCED DEMENTIA (PAINAD)
BODYLANGUAGE: 0
BREATHING: 0
TOTALSCORE: 0
BREATHING: 0
TOTALSCORE: 0
CONSOLABILITY: 0
TOTALSCORE: 0
BREATHING: 0
NEGVOCALIZATION: 0
CONSOLABILITY: 0
TOTALSCORE: 0
NEGVOCALIZATION: 0
BODYLANGUAGE: 0
BREATHING: 0
BODYLANGUAGE: 0
NEGVOCALIZATION: 0
FACIALEXPRESSION: 0
BODYLANGUAGE: 0
BODYLANGUAGE: 0
NEGVOCALIZATION: 0
BODYLANGUAGE: 0
FACIALEXPRESSION: 0
NEGVOCALIZATION: 0
CONSOLABILITY: 0
NEGVOCALIZATION: 0
FACIALEXPRESSION: 0
FACIALEXPRESSION: 0
NEGVOCALIZATION: 0
NEGVOCALIZATION: 0
TOTALSCORE: 0
CONSOLABILITY: 0
CONSOLABILITY: 0
FACIALEXPRESSION: 0
NEGVOCALIZATION: 0
FACIALEXPRESSION: 0
FACIALEXPRESSION: 0
BREATHING: 0
TOTALSCORE: 0
BREATHING: 0
TOTALSCORE: 0
CONSOLABILITY: 0
CONSOLABILITY: 0
TOTALSCORE: 0
BODYLANGUAGE: 0
BREATHING: 0
FACIALEXPRESSION: 0
TOTALSCORE: 0
CONSOLABILITY: 0
CONSOLABILITY: 0
TOTALSCORE: 0
NEGVOCALIZATION: 0
CONSOLABILITY: 0
BODYLANGUAGE: 0
FACIALEXPRESSION: 0
BREATHING: 0
BREATHING: 0
CONSOLABILITY: 0
FACIALEXPRESSION: 0
NEGVOCALIZATION: 0
BODYLANGUAGE: 0
TOTALSCORE: 0
FACIALEXPRESSION: 0
BODYLANGUAGE: 0
CONSOLABILITY: 0
BREATHING: 0
TOTALSCORE: 0
BREATHING: 0
BREATHING: 0
FACIALEXPRESSION: 0
BODYLANGUAGE: 0
NEGVOCALIZATION: 0
BODYLANGUAGE: 0

## 2021-10-05 NOTE — PROGRESS NOTES
Pt admitted to room 8706. Pt nonverbal. Disoriented x4. Cannot follow commands. Ostomy bag replaced. VS as noted in chart. Admission assessment complete. Fall precautions placed. Med rec from facility complete. Will continue to monitor.

## 2021-10-05 NOTE — CONSULTS
Comprehensive Nutrition Assessment    RECOMMENDATIONS:  1. PO Diet: Continue NPO per MD  2. Nutrition Support: EN recs below as needed:  · Recommend EN formula Renal  Nepro  @ goal rate 35 ml/hr to provide 840 ml total volume, 1512 kcal, 68 g protein and 611 ml free water. · Initiate EN @ 15 mL/hr and as tolerated, increase by 15 mL/hr q 4 hours until goal of 30 mL/hr is met. · Recommend water bolus 100 ml every 4 hours or per Nephrology. NUTRITION ASSESSMENT:   Type and Reason for Visit:  Type and Reason for Visit: Initial, Positive Nutrition Screen   Nutritional summary & status: Pt + screen for low BMI indicating pt is underweight. RD to provide TF recs above for when okat to start nutrition per MD. Noted pt with dementia and therefore a poor historian. Pt answered in one word answers. She reports that she has been experiencing some nausea although endorses good appetite and po intakes prior to adm. Patient admitted d/t: dialysis catheter clogged    PMH significant for: stroke, CAD, HTN, hypercholesterolemia, G-tube in place, NPO, colostomy, ESRD on HD, dementia     MALNUTRITION ASSESSMENT  Context of Malnutrition: Acute Illness   Malnutrition Status: Insufficient data    NUTRITION DIAGNOSIS   · Inadequate oral intake related to cognitive or neurological impairment as evidenced by nutrition support - enteral nutrition      NUTRITION INTERVENTION  Food and/or Nutrient Delivery:  Continue NPO  Nutrition Education/Counseling:  No recommendation at this time   Goals:  Pt will tolerate EN @ goal to meet 100% of nutrition needs. Nutrition Monitoring and Evaluation:   Food/Nutrient Intake Outcomes:  Enteral Nutrition Intake/Tolerance  Physical Signs/Symptoms Outcomes:  Biochemical Data, Weight     OBJECTIVE DATA: Significant to nutrition assessment  · Nutrition-Focused Physical Findings: Nutrition Related Findings: colostomy, no output yet noted   · Labs: Reviewed;  Na 146, K 5.3, POC   · Meds: Reviewed; B complex,   · Wounds: Wound Type: None     CURRENT NUTRITION THERAPIES  Diet NPO     PO Intake: Average Meal Intake: NPO   PO Supplement Intake:Average Supplements Intake: NPO  IVF: n/a     ANTHROPOMETRICS  Current Height: 5' 1\" (154.9 cm)  Current Weight: 95 lb 0.3 oz (43.1 kg)    Admission weight: 95 lb 0.3 oz (43.1 kg)  Ideal Body Weight (lbs) (Calculated): 105 lbs (Ideal Body Weight (Kg) (Calculated): 48 kg)  Usual Bodyweight ~95-97 lbs per EMR   Weight Changes no significant changes noted       BMI BMI (Calculated): 18    Wt Readings from Last 50 Encounters:   10/05/21 95 lb 0.3 oz (43.1 kg)   08/05/20 95 lb 1.6 oz (43.1 kg)   07/08/20 97 lb 12.8 oz (44.4 kg)       COMPARATIVE STANDARDS  Energy (kcal):  4762-6228 kcal/d; Weight Used for Energy Requirements:  Current (43.1 kg)     Protein (g):  65-77 g/d (1.5-108 g/kg); Weight Used for Protein Requirements:  Current (4301 kg)        Fluid (ml/day):  8499-2786 mL/d; Method Used for Fluid Requirements:  1 ml/kcal      The patient will still be monitored per nutrition standards of care. Consult dietitian if nutrition interventions essential to patient care is needed.      Sandhya Bolton, 67 [address], [address] [place] [address] Drive:  [phone]  Office:  [phone]

## 2021-10-05 NOTE — PROGRESS NOTES
4 Eyes Admission Assessment     I agree as the admission nurse that 2 RN's have performed a thorough Head to Toe Skin Assessment on the patient. ALL assessment sites listed below have been assessed on admission. Areas assessed by both nurses:   [x]   Head, Face, and Ears   [x]   Shoulders, Back, and Chest  [x]   Arms, Elbows, and Hands   [x]   Coccyx, Sacrum, and Ischum  [x]   Legs, Feet, and Heels        Does the Patient have Skin Breakdown? Yes Pt has redness/excoriation around ostomy site. Red heels. Open incision on abdomen.          Solitario Prevention initiated:  Yes   Wound Care Orders initiated:  Yes      [id number] nurse consulted for Pressure Injury (Stage 3,4, Unstageable, DTI, NWPT, and Complex wounds):  NA      Nurse 1 eSignature: Electronically signed by Gurmeet Major RN on 10/5/21 at 5:01 AM EDT    **SHARE this note so that the co-signing nurse is able to place an eSignature**    Nurse 2 eSignature: {Esignature:[id number]}

## 2021-10-05 NOTE — CARE COORDINATION
Case Management Assessment            Discharge Note                    Date / Time of Note: 10/5/2021 1:31 PM                  Discharge Note Completed by: Gavin Hodgkins, RN    Patient Name: Anand Mohan   YOB: 1950  Diagnosis: Complication associated with dialysis catheter [T82. 9XXA]   Date / Time: 10/4/2021  3:18 PM    Current PCP: [facility] patient: No    Hospitalization in the last 30 days: No    Advance Directives:  Code Status: Full Code  [place] DNR form completed and on chart: No    Financial:  Payor: MEDICARE / Plan: MEDICARE PART A AND B / Product Type: *No Product type* /      Pharmacy:    [address] [place] [address], [facility] [address] [phone]  283 [facility] [address]  [place] [address]  Phone: [phone] Fax: [phone] [address], [address] [place] [address] [place] [phone] [facility] [phone]  179[address]00 [facility] [address]  Phone: [phone] Fax: [facility] 44 142 [facility], [address] [address] [phone]  [address] [place] [address] [phone] [place] [address]  Phone: [phone] Fax: [phone]      Assistance purchasing medications?:    Assistance provided by Case Management: None at this time    Does patient want to participate in local refill/ meds to beds program?:      Meds To Beds General Rules:  1. Can ONLY be done Monday- Friday between 8:30am-5pm  2. Prescription(s) must be in pharmacy by 3pm to be filled same day  3. Copy of patient's insurance/ prescription drug card and patient face sheet must be sent along with the prescription(s)  4. Cost of Rx cannot be added to hospital bill. If financial assistance is needed, please contact unit  or ;  or  CANNOT provide pharmacy voucher for patients co-pays  5.  Patients can then  the prescription on their way out of the hospital at discharge, or pharmacy can deliver to the bedside if staff is available. (payment due at time of pick-up or delivery - cash, check, or card accepted)     Able to afford home medications/ co-pay costs: Yes    ADLS:  Current PT AM-PAC Score:   /24  Current OT AM-PAC Score:   /24      DISCHARGE Disposition:    [facility]               [address], [address] [place] [address] [place] [address]       Phone: [phone]       Fax: [phone]            LOC at discharge: [address] Completed: Yes    Notification completed in HENS/PAS?:  Not Applicable    IMM Completed:   Not Indicated    Transportation:  Transportation PLAN for discharge: EMS transportation   Mode of Transport: Ambulance stretcher - BLS  Reason for medical transport: Bed confined: Meets the following criteria 1) unable to get out of bed without assistance or ambulate, 2) unable to safely sit up in a wheelchair, 3) unable to maintain erect seating position in a chair for time needed for transport  Name of Transport Company: [employer]  Phone: [phone]  Time of Transport: 2000    Transport form completed: Yes    Home Care:  Home Care ordered at discharge: No  [address]: Not Applicable  Orders faxed: No    Durable Medical Equipment:  DME Provider: defer  Equipment obtained during hospitalization: defer    Home Oxygen and Respiratory Equipment:  Oxygen needed at discharge?: Not [address]: Not Applicable  Portable tank available for discharge?: No    Dialysis:  Dialysis patient: Yes    Dialysis Center:  Not Applicable  At  HD  Facility in house  ;      Hospice Services:  Location: Not Applicable  Agency: Not Applicable    Consents signed: Not Indicated    Referrals made at [facility] for outpatient continued care:  Not Applicable    Additional CM Notes:     CM  Called  And confirmed  [atient from [facility] [address] Rajiv Archer can return later this evening after  HD cath placed and  Has  HD run :  CM spoke with [employer]  [phone]  Ans  Aware  Of transport time ,  CM  Will print  VINCENT and she can pull Orders from Epic . RN to lilly report     Transport  At  9 pm  Provided  patient has  Had  Line placed and completed  HD  . If not  RN will cancel transport     D/W  HENRY Kramer      The Plan for Transition of Care is related to the following treatment goals of Complication associated with dialysis catheter [T82. 9XXA]    The Patient and/or patient representative India Kohli and her family were provided with a choice of provider and agrees with the discharge plan Yes    Freedom of choice list was provided with basic dialogue that supports the patient's individualized plan of care/goals and shares the quality data associated with the providers.  Yes    Care Transitions patient: No    Robin Dixon RN  [facility]  Case Management Department  Ph: [phone]

## 2021-10-05 NOTE — H&P
Hospital Medicine History & Physical      PCP: AYDEN Weinberg    Date of Admission: 10/4/2021    Date of Service: Pt seen/examined on 10/4/2021    Chief Complaint:      Chief Complaint   Patient presents with    Other     dialysis catheter clogged       History Of Present Illness:   81 yo F with PMH of ESRD on HD, Hx of CVA presented to the hospital with HD cath malfunction. Pt had last HD on 9/28. Per report from nursing staff at dialysis center, they tried Cathflo 3 times with no success and due to the clogged catheter she was sent to the ER.pt unable to provide any history due to her underlying mental status    Past Medical History:        Diagnosis Date    CAD (coronary artery disease)     s/p CABG 2000. Cath 9/10 1/3 grafts occluded. Patent LIMA-LAD, patent SVG-OM, 60% distal LAD lesion distal to anastamosis, occluded SVG-LCX,, 70% prox native LCx, diffuse RCA dz. Declines redo CABG. will consider PCI of LAD and LCX    [facility] [facility]     Diabetes mellitus ([facility] [facility]     followed by PCP    Hyperlipidemia     rec semi annual lipids w/ LDL goal <70    Lung mass     likely benign    Syncope     secondary to medications       Past Surgical History:        Procedure Laterality Date    BREAST SURGERY      left breast tumor removed    CARDIAC SURGERY  2000    CABG    CHOLECYSTECTOMY      CORONARY ANGIOPLASTY WITH STENT PLACEMENT  2019    CORONARY ARTERY BYPASS GRAFT      DIAGNOSTIC CARDIAC CATH LAB PROCEDURE      KNEE SURGERY      UPPER GASTROINTESTINAL ENDOSCOPY N/A 10/14/2019    EGD DIAGNOSTIC ONLY performed by Roya Schwartz MD at [facility]       Medications Prior to Admission:    Prior to Admission medications    Medication Sig Start Date End Date Taking?  Authorizing Provider   acetaminophen (TYLENOL) 325 MG tablet 650 mg by Per G Tube route every 4 hours as needed for Pain or Fever   Yes Historical Provider, MD   insulin lispro (HUMALOG) 100 UNIT/ML injection vial Inject 0-10 Units into the skin every 6 hours Per Sliding scale   Yes Historical Provider, MD   aspirin 81 MG chewable tablet 81 mg by Per G Tube route daily   Yes Historical Provider, MD   vitamin B complex (NATURES BLEND B COMPLEX) TABS tablet 1 tablet by Per G Tube route daily   Yes Historical Provider, MD   famotidine (PEPCID) 10 MG tablet 10 mg by Per G Tube route daily For GERD for 30 days. 9/24/21 10/24/21 Yes Historical Provider, MD   atorvastatin (LIPITOR) 80 MG tablet 80 mg by Per G Tube route nightly   Yes Historical Provider, MD   midodrine (PROAMATINE) 10 MG tablet 10 mg 3 times daily Via G tube   Yes Historical Provider, MD   OLANZapine (ZYPREXA) 10 MG tablet 10 mg by Per G Tube route nightly   Yes Historical Provider, MD   insulin glargine (SEMGLEE) 100 UNIT/ML injection vial Inject 27 Units into the skin 2 times daily   Yes Historical Provider, MD   valproic acid (DEPAKENE) 250 MG/5ML SOLN oral solution 130 mg by Per G Tube route 3 times daily   Yes Historical Provider, MD   gabapentin (NEURONTIN) 100 MG capsule Take 100 mg by mouth 3 times daily. Historical Provider, MD       Allergies:  Iodine    Social History:       reports that she has never smoked. She has never used smokeless tobacco. She reports that she does not drink alcohol and does not use drugs. Family History:  Reviewed in detail and Positive as follows:        Problem Relation Age of Onset    Stroke Mother     Heart Disease Father        REVIEW OF SYSTEMS:   Positive review  noted in the HPI. All other systems reviewed and negative.     PHYSICAL EXAM:    /62   Pulse 94   Temp 98.1 °F (36.7 °C) (Axillary)   Resp 18   Ht 5' 1\" (1.549 m)   LMP  (LMP Unknown)   SpO2 97%   BMI 17.97 kg/m²   General Appearance: NAD  Skin: warm and dry, no rash or erythema  Head: normocephalic and atraumatic  Eyes: pupils equal, round, and reactive to light, extraocular eye movements intact, conjunctivae normal  ENT: tympanic membrane, external hours. Nasal Culture: No results for input(s): ORG, MRSAPCR in the last 72 hours. Blood Culture: No results for input(s): BC, BLOODCULT2, ORG in the last 72 hours. Fungal Culture:   No results for input(s): FUNGSM in the last 72 hours. No results for input(s): FUNCXBLD in the last 72 hours. CSF Culture:  No results for input(s): COLORCSF, APPEARCSF, CFTUBE, CLOTCSF, WBCCSF, RBCCSF, NEUTCSF, NUMCELLSCSF, LYMPHSCSF, MONOCSF, GLUCCSF, VOLCSF in the last 72 hours. Respiratory Culture:  No results for input(s): Earlis Card in the last 72 hours. AFB:No results for input(s): AFBSMEAR in the last 72 hours. Urine Culture  No results for input(s): LABURIN in the last 72 hours. RADIOLOGY:    XR CHEST PORTABLE   Final Result    Impression: Right basilar somewhat nodular opacity. This is new from prior. Previous medical records personally reviewed and analyzed         PHYSICIAN CERTIFICATION    I certify that Danielle Lee is expected to be hospitalized for <2 midnights based on the following assessment and plan:    ASSESSMENT/PLAN:  Active Hospital Problems    Diagnosis Date Noted    Complication associated with dialysis catheter [T82. 9XXA] 10/04/2021     Clogged dialysis catheter  ESRD on HD  Hypernatremia  Anemia of CKD  Hx of CVA  Diabetes mellitus    Plan:  -Nephrology has been consulted  -plan for catheter exchange tomm with HD  -ISS, lantus  -continue home meds    DVT Prophylaxis: lovenox  Diet: Diet NPO  Code Status: Full Code    Dispo - pending clinical course       Evans Mcdonald MD  The note was completed using EMR. Every effort was made to ensure accuracy; however, inadvertent computerized transcription errors may be present. Thank you [facility] for the opportunity to be involved in this patient's care. If you have any questions or concerns please feel free to contact me at [phone].

## 2021-10-05 NOTE — PROGRESS NOTES
Spoke with patient's son Carlita Cross. Obtained phone consent for tunneled line placement with 2nd RN Ej Jules as witness. Signed and placed on chart.

## 2021-10-05 NOTE — PROGRESS NOTES
2nd attempt to contact patient's son Kamla Breen to obtain phone consent for HD line placement. Left HIPAA compliant message for him to call me back.

## 2021-10-05 NOTE — H&P
Nephrology Consult Note                                                                                                                                                                                                                                                                                                                                                               Office : [phone]     Fax :[phone]              Patient's Name: Christina Thomason  1:19 PM  10/5/2021    Reason for Consult: Dialysis Catheter Replacement    History of Present Ilness:    Christina Thomason is a 80 y.o. female with PMH ESRD on HD MWF, anemia, HTN, dementia, CAD, stroke, gastric tube in place and NPO with colostomy in place. Patient presented to the ED on 10/04/21 from her dialysis center with a clogged dialysis catheter. It was reported that nursing staff at the dialysis center administered cathflo x 3 without any successful flow through the catheter. Patient subsequently presented to the ED afebrile and with vital signs within normal limits. Patient was admitted to the hospital for dialysis catheter replacement. Upon arrival patient had a right IJ tunneled catheter in place. Since arrival serum potassium increased from 5.1 --> 5.3. This AM patient was alert and responsive and was awake and lying in her bed. ROS was limited due to patient's PMH of dementia. Past Medical History:   Diagnosis Date    CAD (coronary artery disease)     s/p CABG 2000. Cath 9/10 1/3 grafts occluded. Patent LIMA-LAD, patent SVG-OM, 60% distal LAD lesion distal to anastamosis, occluded SVG-LCX,, 70% prox native LCx, diffuse RCA dz. Declines redo CABG.   will consider PCI of LAD and LCX    Carotid artery disease ([address]     Diabetes mellitus ([facility] [address]     followed by PCP    Hyperlipidemia     rec semi annual lipids w/ LDL goal <70    Lung mass     likely benign    Syncope     secondary to medications       Past Surgical History:   Procedure Laterality Date    BREAST SURGERY      left breast tumor removed    CARDIAC SURGERY  2000    CABG    CHOLECYSTECTOMY      CORONARY ANGIOPLASTY WITH STENT PLACEMENT  2019    CORONARY ARTERY BYPASS GRAFT      DIAGNOSTIC CARDIAC CATH LAB PROCEDURE      KNEE SURGERY      UPPER GASTROINTESTINAL ENDOSCOPY N/A 10/14/2019    EGD DIAGNOSTIC ONLY performed by Arturo Vizcaino MD at [facility] Hospital History   Problem Relation Age of Onset    Stroke Mother     Heart Disease Father         reports that she has never smoked. She has never used smokeless tobacco. She reports that she does not drink alcohol and does not use drugs.     Allergies:  Iodine    Current Medications:    heparin (porcine) injection 5,000 Units, 3 times per day  aspirin chewable tablet 81 mg, Daily  famotidine (PEPCID) tablet 10 mg, Daily  atorvastatin (LIPITOR) tablet 80 mg, Nightly  OLANZapine (ZYPREXA) tablet 10 mg, Nightly  valproic acid (DEPAKENE) 250 MG/5ML oral solution 130 mg, TID  midodrine (PROAMATINE) tablet 10 mg, TID WC  sodium chloride flush 0.9 % injection 5-40 mL, 2 times per day  sodium chloride flush 0.9 % injection 5-40 mL, PRN  0.9 % sodium chloride infusion, PRN  ondansetron (ZOFRAN-ODT) disintegrating tablet 4 mg, Q8H PRN   Or  ondansetron (ZOFRAN) injection 4 mg, Q6H PRN  polyethylene glycol (GLYCOLAX) packet 17 g, Daily PRN  acetaminophen (TYLENOL) tablet 650 mg, Q6H PRN   Or  acetaminophen (TYLENOL) suppository 650 mg, Q6H PRN  glucose (GLUTOSE) 40 % oral gel 15 g, PRN  dextrose 50 % IV solution, PRN  glucagon (rDNA) injection 1 mg, PRN  dextrose 5 % solution, PRN  insulin glargine (LANTUS;BASAGLAR) injection pen 27 Units, BID  insulin lispro (1 Unit Dial) 0-12 Units, TID WC  insulin lispro (1 Unit Dial) 0-6 Units, Nightly  b complex-C-E-zinc (STRESS FORMULA W/ ZINC) 1 tablet, Daily            Physical exam:     Vitals:  BP (!) 157/73   Pulse 89   Temp 97.9 °F (36.6 °C) (Oral)   Resp 18   Ht 5' 1\" (1.549 m) Wt 95 lb 0.3 oz (43.1 kg)   LMP  (LMP Unknown)   SpO2 99%   BMI 17.95 kg/m²   Physical Exam  Cardiovascular:      Rate and Rhythm: Normal rate and regular rhythm. Pulmonary:      Effort: Pulmonary effort is normal. No respiratory distress. Breath sounds: Normal breath sounds. Abdominal:      General: There is no distension. Palpations: Abdomen is soft. Musculoskeletal:      Right lower leg: No edema. Left lower leg: No edema. Skin:     General: Skin is warm and dry. Neurological:      Mental Status: She is alert. Data:   Labs:  CBC:   Recent Labs     10/04/21  1607 10/05/21  0718   WBC 11.6* 6.1   HGB 9.2* 8.0*    130*     BMP:    Recent Labs     10/04/21  1607 10/05/21  0718   * 146*   K 5.1 5.3*   * 110   CO2 24 24   BUN 93* 101*   CREATININE 2.3* 2.6*   GLUCOSE 191* 208*     Ca/Mg/Phos:   Recent Labs     10/04/21  1607 10/05/21  0718   CALCIUM 9.6 9.2     Hepatic: No results for input(s): AST, ALT, ALB, BILITOT, ALKPHOS in the last 72 hours. Troponin: No results for input(s): TROPONINI in the last 72 hours. BNP: No results for input(s): BNP in the last 72 hours. Lipids: No results for input(s): CHOL, TRIG, HDL, LDLCALC, LABVLDL in the last 72 hours. ABGs: No results for input(s): PHART, PO2ART, PNN9TJF in the last 72 hours. INR:   Recent Labs     10/05/21  0930   INR 1.03     UA:No results for input(s): Renu John, GLUCOSEU, BILIRUBINUR, Jerline Parker, BLOODU, PHUR, PROTEINU, UROBILINOGEN, NITRU, LEUKOCYTESUR, Arvell Roes in the last 72 hours. Urine Microscopic: No results for input(s): LABCAST, BACTERIA, COMU, HYALCAST, WBCUA, RBCUA, EPIU in the last 72 hours. Urine Culture: No results for input(s): LABURIN in the last 72 hours. Urine Chemistry: No results for input(s): Lonie [place], PROTEINUR, NAUR in the last 72 hours. IMAGING:  XR CHEST PORTABLE   Final Result    Impression: Right basilar somewhat nodular opacity. This is new from prior. Assessment/Plan   1. ESRD on HD MWF with dialysis catheter replacement needed    2. HTN    3.  Anemia    4. Acid- base/ Electrolyte imbalance     ___________________  -IR consulted by nephrology for dialysis catheter exchange   -patient to go for dialysis today   -monitor electrolytes   -monitor BP  -Anemia management   -MBD management                 Thank you for allowing us to participate in care of Frank Jeffries  MS-4 acted as my scribe     Pt seen on HD   BP drop noted   Albumin x 1     Elier Herndon MD

## 2021-10-05 NOTE — SIGNIFICANT EVENT
At 17:18 a rapid response was called due to reported AMS and hypotension while patient was undergoing volume neutral hemodialysis. According to nursing staff her SBP dropped to 60's for a few minutes while receiving dialysis. During that time patient was also increasingly altered and would not respond to name and pain which she does at baseline. At baseline patient is largely aphasic but can occasionally respond with words and follow commands. Pt had returned to mental baseline when the ICU team arrived and was able to follow commands and respond to her name. There were no clear focal motor or neurological deficits from her reported baseline. SBP quickly hyacinth back up to an SBP of 140's for multiple readings. Hypotension and AMS only lasted a few minutes and patient self-resolved. Other vitals remained stable. Presentation likely due to dysequilibrium during dialysis. Patient dialysis will remain volume neutral to avoid exacerbating hypotension.     Denae Gallo MD  Internal Medicine PGY1

## 2021-10-05 NOTE — PLAN OF CARE
Problem: Skin Integrity:  Goal: Absence of new skin breakdown  Description: Absence of new skin breakdown  Outcome: Ongoing  Note: Pt is at risk for skin breakdown. Mepilex applied to bilat heels. Specialty mattress ordered. Pt turned Q2. Heels elevated off bed. Will continue to monitor. Problem: Falls - Risk of:  Goal: Will remain free from falls  Description: Will remain free from falls  Outcome: Ongoing  Note: Pt at risk for falls. Bed locked in the lowest position with the alarm on. 3/4 siderails up.

## 2021-10-05 NOTE — PROGRESS NOTES
Messaged Dr. Flavio Magdaleno and made aware of brief episode of AMS, and drop in blood pressure. Asked if he thought discharge order should be cancelled.     Per Dr. Flavio Magdaleno:    10/5/21 6:24 PM   How is she doing now     10/5/21 6:24 PM   We can observe het another couple of hours     10/5/21 6:24 PM   If she is finr she can still leave

## 2021-10-05 NOTE — FLOWSHEET NOTE
10/05/21 1627 10/05/21 1755   Vital Signs   BP (!) 110/50 (!) 157/64   Pulse 98 98   Weight 113 lb 5.1 oz (51.4 kg) 116 lb 2.9 oz (52.7 kg)   Weight Method Bed scale  (1 pillow and 1 sheet left.) Bed scale  (1 pillow and 1 sheet left.)   Treatment time: 3 hours     Net UF: 500 ml positive today. Pre weight: 51.4kg  Post weight: 52.7 kg  EDW: 58 kg per clinic. (To be reevaluated.)    Access used: Rt. TDC   Access function: Poor. Best  ml/min  d/t sluggish aspiration at arterial port. Medications or blood products given: Midodrine 10 mg given prior to HD by primary RN. Albumin 12.5g given. Regular outpatient schedule: MW    Summary of response to treatment: Tx terminated 2 hours 19 mins early d/t episode of unresponsive per MD. Pt came back after rinse back. Called rapid response team in case. Low BP during tx even though NSS given for keeping even. MD aware. Pt returned to room with stable condition with RNs. Copy of dialysis treatment record placed in chart, to be scanned into EMR.

## 2021-10-05 NOTE — PRE SEDATION
Sedation Pre-Procedure Note    Patient Name: Enrique Saxena   YOB: 1950  Room/Bed: [facility]3/7763-69  Medical Record Number: [id number]  Date: 10/5/2021   Time: 1500    Indication:  Malfunctioning tunneled dialysis catheter. Request for exchange    Consent: I have discussed with the patient and/or the patient representative the indication, alternatives, and the possible risks and/or complications of the planned procedure and the anesthesia methods. The patient and/or patient representative appear to understand and agree to proceed. Vital Signs:   Vitals:    10/05/21 1225   BP: (!) 157/73   Pulse: 89   Resp: 18   Temp: 97.9 °F (36.6 °C)   SpO2: 99%       Past Medical History:   has a past medical history of CAD (coronary artery disease), Carotid artery disease ([facility] [address] Diabetes mellitus ([facility] [address] Hyperlipidemia, Lung mass, and Syncope. Past Surgical History:   has a past surgical history that includes Cardiac surgery (2000); Cholecystectomy; Breast surgery; Coronary artery bypass graft; knee surgery; Upper gastrointestinal endoscopy (N/A, 10/14/2019); Diagnostic Cardiac Cath Lab Procedure; and Coronary angioplasty with stent (2019).     Medications:   Scheduled Meds:    heparin (porcine)  5,000 Units SubCUTAneous 3 times per day    aspirin  81 mg Per G Tube Daily    famotidine  10 mg Per G Tube Daily    atorvastatin  80 mg Per G Tube Nightly    OLANZapine  10 mg Per G Tube Nightly    valproic acid  130 mg Per G Tube TID    midodrine  10 mg Per G Tube TID WC    sodium chloride flush  5-40 mL IntraVENous 2 times per day    insulin glargine  27 Units SubCUTAneous BID    insulin lispro  0-12 Units SubCUTAneous TID WC    insulin lispro  0-6 Units SubCUTAneous Nightly    b complex-C-E-zinc  1 tablet Per G Tube Daily     Continuous Infusions:    sodium chloride      dextrose       PRN Meds: sodium chloride flush, sodium chloride, ondansetron **OR** ondansetron, polyethylene glycol, acetaminophen **OR** acetaminophen, glucose, dextrose, glucagon (rDNA), dextrose  Home Meds:   Prior to Admission medications    Medication Sig Start Date End Date Taking? Authorizing Provider   acetaminophen (TYLENOL) 325 MG tablet 650 mg by Per G Tube route every 4 hours as needed for Pain or Fever   Yes Historical Provider, MD   insulin lispro (HUMALOG) 100 UNIT/ML injection vial Inject 0-10 Units into the skin every 6 hours Per Sliding scale   Yes Historical Provider, MD   aspirin 81 MG chewable tablet 81 mg by Per G Tube route daily   Yes Historical Provider, MD   vitamin B complex (NATURES BLEND B COMPLEX) TABS tablet 1 tablet by Per G Tube route daily   Yes Historical Provider, MD   famotidine (PEPCID) 10 MG tablet 10 mg by Per G Tube route daily For GERD for 30 days. 9/24/21 10/24/21 Yes Historical Provider, MD   atorvastatin (LIPITOR) 80 MG tablet 80 mg by Per G Tube route nightly   Yes Historical Provider, MD   midodrine (PROAMATINE) 10 MG tablet 10 mg 3 times daily Via G tube   Yes Historical Provider, MD   OLANZapine (ZYPREXA) 10 MG tablet 10 mg by Per G Tube route nightly   Yes Historical Provider, MD   insulin glargine (SEMGLEE) 100 UNIT/ML injection vial Inject 27 Units into the skin 2 times daily   Yes Historical Provider, MD   valproic acid (DEPAKENE) 250 MG/5ML SOLN oral solution 130 mg by Per G Tube route 3 times daily   Yes Historical Provider, MD   gabapentin (NEURONTIN) 100 MG capsule Take 100 mg by mouth 3 times daily. Historical Provider, MD     Coumadin Use Last 7 Days:  no  Antiplatelet drug therapy use last 7 days: no  Other anticoagulant use last 7 days: no  Additional Medication Information:  -      Pre-Sedation Documentation and Exam:   Vital signs have been reviewed (see flow sheet for vitals).     Mallampati Airway Assessment:  Mallampati Class II - (soft palate, fauces & uvula are visible)    Prior History of Anesthesia Complications:   none    ASA Classification:  Class 3 - A patient with

## 2021-10-05 NOTE — DISCHARGE INSTR - COC
Continuity of Care Form    Patient Name: Anand Mohan   :  1950  MRN:  [id number]    Admit date:  10/4/2021  Discharge date:  ***    Code Status Order: Full Code   Advance Directives:      Admitting Physician:  Aline Bravo MD  PCP: [facility]    Discharging Nurse: [facility] Unit/Room#: 9908/2892-17  Discharging Unit Phone Number: ***    Emergency Contact:   Extended Emergency Contact Information  Primary Emergency Contact: Jaden Arndt   [address] Phone: [phone]  Relation: Child    Past Surgical History:  Past Surgical History:   Procedure Laterality Date    BREAST SURGERY      left breast tumor removed    CARDIAC SURGERY      CABG    CHOLECYSTECTOMY      CORONARY ANGIOPLASTY WITH STENT PLACEMENT      CORONARY ARTERY BYPASS GRAFT      DIAGNOSTIC CARDIAC CATH LAB PROCEDURE      KNEE SURGERY      UPPER GASTROINTESTINAL ENDOSCOPY N/A 10/14/2019    EGD DIAGNOSTIC ONLY performed by Brigitte Mccarthy MD at [facility]       Immunization History:   Immunization History   Administered Date(s) Administered    Pneumococcal Conjugate 13-valent ([id number]) 2015    Pneumococcal Polysaccharide ([id number]) 2003       Active Problems:  Patient Active Problem List   Diagnosis Code    Peripheral vascular disease ([facility] [address] I73.9    Shortness of breath R06.02    Other chest pain R07.89    Diabetes mellitus ([facility] [address] E11.9    Carotid stenosis I65.29    CAD (coronary artery disease) I25.10    Hyperlipidemia E78.5    Vasovagal syncope R55    Pure hypercholesterolemia E78.00    Status post left heart catheterization Z98.890    Anemia D64.9    NSTEMI (non-ST elevated myocardial infarction) ([facility] [address] I21.4    CAD S/P percutaneous coronary angioplasty I25.10, Z98.61    Essential hypertension I10    Unstable angina (HCC) I20.0    Fatigue A39.68    Complication associated with dialysis catheter T82. 9XXA       Isolation/Infection: Isolation            No Isolation          Unreconciled Outside Infections       Enable clinical decision support by reconciling outside information with the patient's chart. .      Infection Onset Last Indicated Last Received Source    VRE 09/15/21 09/15/21 09/28/21 21 Diane Cuevas          Patient Infection Status       None to display            Nurse Assessment:  Last Vital Signs: BP (!) 123/59   Pulse 99   Temp 98.5 °F (36.9 °C) (Oral)   Resp 18   Ht 5' 1\" (1.549 m)   Wt 95 lb 0.3 oz (43.1 kg)   LMP  (LMP Unknown)   SpO2 100%   BMI 17.95 kg/m²     Last documented pain score (0-10 scale):    Last Weight:   Wt Readings from Last 1 Encounters:   10/05/21 95 lb 0.3 oz (43.1 kg)     Mental Status:  disoriented, alert and thought processes intact    IV Access:  - None  - Dialysis Catheter  - site  right and subclavian, insertion date: 10/5/2021    Nursing Mobility/ADLs:  Walking   Dependent  Transfer  Dependent  Bathing  Dependent  Dressing  Dependent  Toileting  Dependent  Feeding  Dependent  Med Admin  Dependent  Med Delivery   crushed through g tube. Wound Care Documentation and Therapy:        Elimination:  Continence:   · Bowel: Yes - Colostomy  · Bladder: No  Urinary Catheter: None   Colostomy/Ileostomy/Ileal Conduit: Yes  Colostomy RLQ-Stoma  Assessment: Pink  Colostomy RLQ-Peristomal Assessment: Clean, Intact  Colostomy RLQ-Treatment: Bag change    Date of Last BM: ***  No intake or output data in the 24 hours ending 10/05/21 1002  No intake/output data recorded. Safety Concerns:      At Risk for Falls and Aspiration Risk    Impairments/Disabilities:      Contractures - Right arm    Nutrition Therapy:  Current Nutrition Therapy:   - Oral Diet:  Dysphagia 1 pureed    Routes of Feeding: Oral and Gastrostomy Tube  Liquids: Honey Thickened  Daily Fluid Restriction: no  Last Modified Barium Swallow with Video (Video Swallowing Test): done on 10/06/2021    Treatments at the Time of Hospital Discharge:   Respiratory Treatments: ***  Oxygen Therapy:  is not on home oxygen therapy. Ventilator:    - No ventilator support    Rehab Therapies: N/A  Weight Bearing Status/Restrictions: No weight bearing restirctions  Other Medical Equipment (for information only, NOT a DME order):  {EQUIPMENT:[id number]}  Other Treatments:     Patient's personal belongings (please select all that are sent with patient):  None    RN SIGNATURE:  Electronically signed by Gisele Acuña RN on 10/7/21 at 1:43 PM EDT    CASE MANAGEMENT/SOCIAL WORK SECTION    Inpatient Status Date: ***    Readmission Risk Assessment Score:  Readmission Risk              Risk of Unplanned Readmission:  0           Discharging to Facility/ Agency   · Name:   · Address:  · Phone:  · Fax:    Dialysis Facility (if applicable)   · Name:  · Address:  · Dialysis Schedule:  · Phone:  · Fax:    / signature: {Esignature:[id number]:::0}    PHYSICIAN SECTION    Prognosis: Fair    Condition at Discharge: Stable    Rehab Potential (if transferring to Rehab): Fair    Recommended Labs or Other Treatments After Discharge: pt/ot    Physician Certification: I certify the above information and transfer of Cristina Simmons  is necessary for the continuing treatment of the diagnosis listed and that she requires [facility] for greater 30 days.      Update Admission H&P: No change in H&P    PHYSICIAN SIGNATURE:  Electronically signed by Satinder Espino MD on 10/5/21 at 10:03 AM EDT

## 2021-10-05 NOTE — PROGRESS NOTES
Patient to dialysis from new vas cath placement. Small amount of blood noted under the dressing, and small hematoma noted. VSS. Radiologist aware.

## 2021-10-05 NOTE — PLAN OF CARE
Problem: Skin Integrity:  Goal: Will show no infection signs and symptoms  Description: Will show no infection signs and symptoms  Outcome: Ongoing  Note: Patient shows no s/s of new skin breakdown. Patient on specialty mattress, high ag score, pressure ulcer precautions in place. Turned and repositioned q 2 hours and as needed.       Problem: Falls - Risk of:  Goal: Will remain free from falls  Description: Will remain free from falls  10/5/2021 1208 by Nikki Griffin RN  Outcome: Ongoing

## 2021-10-05 NOTE — DISCHARGE SUMMARY
Hospitalist Discharge Summary    Patient ID:  Vikas Salas  [id number]  80 y.o.  1950    Admit date: 10/4/2021    Discharge date: 10/5/2021    Disposition: SNF    Admission Diagnoses:   Patient Active Problem List   Diagnosis    Peripheral vascular disease ([facility] [address]    Shortness of breath    Other chest pain    Diabetes mellitus ([facility] [address]    Carotid stenosis    CAD (coronary artery disease)    Hyperlipidemia    Vasovagal syncope    Pure hypercholesterolemia    Status post left heart catheterization    Anemia    NSTEMI (non-ST elevated myocardial infarction) ([facility] [address]    CAD S/P percutaneous coronary angioplasty    Essential hypertension    Unstable angina (HCC)    Fatigue    Complication associated with dialysis catheter       Discharge Diagnoses: Active Problems:    Complication associated with dialysis catheter  Resolved Problems:    * No resolved hospital problems. *      Code Status:  Full Code    Condition:  Stable    Discharge Diet: Diet:  Diet NPO    PCP to do list: Follow for improvement of symptoms    Hospital Course: 81 yo F with PMH of ESRD on HD, Hx of CVA presented to the hospital with HD cath malfunction. Pt had last HD on 9/28. Per report from nursing staff at dialysis center, they tried Cathflo 3 times with no success and due to the clogged catheter she was sent to the ER.pt unable to provide any history due to her underlying mental status     Clogged dialysis catheter  ESRD on HD  Hypernatremia  Anemia of CKD  Hx of CVA  Diabetes mellitus     Plan:  -Nephrology was consulted. IR was consulted for HD catheter exchange.   Patient to receive dialysis today and stable for discharge after that  -ISS, lantus  -continue home meds    Discharge Medications:   Current Discharge Medication List        Current Discharge Medication List        Current Discharge Medication List      CONTINUE these medications which have NOT CHANGED    Details   acetaminophen (TYLENOL) 325 MG tablet 650 mg by Per G Tube route every 4 hours as needed for Pain or Fever      insulin lispro (HUMALOG) 100 UNIT/ML injection vial Inject 0-10 Units into the skin every 6 hours Per Sliding scale      aspirin 81 MG chewable tablet 81 mg by Per G Tube route daily      vitamin B complex (NATURES BLEND B COMPLEX) TABS tablet 1 tablet by Per G Tube route daily      famotidine (PEPCID) 10 MG tablet 10 mg by Per G Tube route daily For GERD for 30 days. atorvastatin (LIPITOR) 80 MG tablet 80 mg by Per G Tube route nightly      midodrine (PROAMATINE) 10 MG tablet 10 mg 3 times daily Via G tube      OLANZapine (ZYPREXA) 10 MG tablet 10 mg by Per G Tube route nightly      insulin glargine (SEMGLEE) 100 UNIT/ML injection vial Inject 27 Units into the skin 2 times daily      valproic acid (DEPAKENE) 250 MG/5ML SOLN oral solution 130 mg by Per G Tube route 3 times daily      gabapentin (NEURONTIN) 100 MG capsule Take 100 mg by mouth 3 times daily.            Current Discharge Medication List              Procedures: HD catheter exchange    Assessment on Discharge: Stable, improved     Discharge ROS:  A complete review of systems was asked and negative except for none    Discharge Exam:  BP (!) 157/73   Pulse 89   Temp 97.9 °F (36.6 °C) (Oral)   Resp 18   Ht 5' 1\" (1.549 m)   Wt 95 lb 0.3 oz (43.1 kg)   LMP  (LMP Unknown)   SpO2 99%   BMI 17.95 kg/m²     General Appearance: NAD  Skin: warm and dry, no rash or erythema  Head: normocephalic and atraumatic  Eyes: pupils equal, round, and reactive to light, extraocular eye movements intact, conjunctivae normal  ENT: tympanic membrane, external ear and ear canal normal bilaterally, nose without deformity, nasal mucosa and turbinates normal without polyps  Neck: supple and non-tender without mass, no thyromegaly or thyroid nodules, no cervical lymphadenopathy  Pulmonary/Chest: clear to auscultation bilaterally- no wheezes, rales or rhonchi, normal air movement, no respiratory distress  Cardiovascular: normal rate, regular rhythm, normal S1 and S2, no murmurs, rubs, clicks, or gallops, Peripheral pulses good, Cap refill <3 sec, no carotid bruits  Abdomen: soft, non-tender, non-distended, PEG tube in place  Extremities: no cyanosis, clubbing or edema  Musculoskeletal: normal range of motion, no joint swelling, deformity or tenderness  Neurologic: moving all extremities, unable to fully assess due to pt mental status    Pertinent Studies During Hospital Stay:  Radiology:  XR CHEST PORTABLE    Result Date: 10/4/2021  XR CHEST PORTABLE Indication: missed dialysis COMPARISON: September 10, 2021 Findings: Portable AP upright view of the chest was obtained. The right internal jugular approach tunneled dialysis catheter is unchanged terminating within the region of the mid superior vena cava. Sternotomy wires are noted. The heart is stable in size  and configuration. Bibasilar opacity is noted. Otherwise the lungs are clear. No pneumothorax or effusion. Impression: Right basilar somewhat nodular opacity. This is new from prior. XR CHEST 1 VIEW    Result Date: 9/10/2021  Chest AP portable at 2320 INDICATIONS: Tube removal, nonspecified HISTORY OF PRESENT ILLNESS: who presents  Right internal jugular dialysis catheter with its tip in the distal SVC Sternotomy sutures Cardiomegaly Small left effusion Low lung volumes with basilar atelectasis No congestive changes. 1. Low lung volumes with basilar atelectasis 2. Right internal jugular dialysis catheter    IR REPLACE TUNNELED CVC WO SQ PORT/PUMP SAME ACCESS    Result Date: 10/5/2021  IR REPLACE TUNNELED CVC WO SQ PORT/PUMP SAME ACCESS IR tunneled central venous dialysis catheter exchange utilizing fluoroscopic guidance Indication: 71-year-old female with end-stage renal disease and malfunctioning dialysis catheter. : Dr. Delores Diaz Procedure:  The patient was advised of the benefits, risks, and alternatives of the procedure

## 2021-10-05 NOTE — PLAN OF CARE
Problem: Nutrition  Goal: Optimal nutrition therapy  Note: Nutrition Problem #1: Inadequate oral intake  Intervention: Food and/or Nutrient Delivery: Continue NPO  Nutritional Goals: Pt will tolerate EN @ goal to meet 100% of nutrition needs.

## 2021-10-05 NOTE — PROCEDURES
IR Procedure Note    Requested procedure: tunneled dialysis catheter exchange    Procedure: Successful exchange to tunneled dialysis catheter. New catheter is longer and extends to cavoatrial junction. The previous catheter terminated in the mid SVC (likely cause of problem). Ready for use.     Anesthesia: local lidocaine    Surgeons/Assistants: Evelyn Cortez    Estimated Blood Loss: Minimal    Complications: None    Heidy Gloria MD MD  10/5/2021

## 2021-10-06 ENCOUNTER — APPOINTMENT (OUTPATIENT)
Dept: GENERAL RADIOLOGY | Age: 71
DRG: 312 | End: 2021-10-06
Payer: MEDICARE

## 2021-10-06 LAB
ALBUMIN SERPL-MCNC: 3.5 G/DL (ref 3.4–5)
ANION GAP SERPL CALCULATED.3IONS-SCNC: 13 MMOL/L (ref 3–16)
BASOPHILS ABSOLUTE: 0 K/UL (ref 0–0.2)
BASOPHILS RELATIVE PERCENT: 0.7 %
BUN BLDV-MCNC: 69 MG/DL (ref 7–20)
CALCIUM SERPL-MCNC: 8.9 MG/DL (ref 8.3–10.6)
CHLORIDE BLD-SCNC: 110 MMOL/L (ref 99–110)
CO2: 24 MMOL/L (ref 21–32)
CREAT SERPL-MCNC: 2.1 MG/DL (ref 0.6–1.2)
EOSINOPHILS ABSOLUTE: 0.2 K/UL (ref 0–0.6)
EOSINOPHILS RELATIVE PERCENT: 3.2 %
GFR AFRICAN AMERICAN: 28
GFR NON-AFRICAN AMERICAN: 23
GLUCOSE BLD-MCNC: 102 MG/DL (ref 70–99)
GLUCOSE BLD-MCNC: 146 MG/DL (ref 70–99)
GLUCOSE BLD-MCNC: 306 MG/DL (ref 70–99)
GLUCOSE BLD-MCNC: 74 MG/DL (ref 70–99)
GLUCOSE BLD-MCNC: 88 MG/DL (ref 70–99)
GLUCOSE BLD-MCNC: 98 MG/DL (ref 70–99)
HCT VFR BLD CALC: 21.8 % (ref 36–48)
HEMOGLOBIN: 7.2 G/DL (ref 12–16)
LYMPHOCYTES ABSOLUTE: 1.1 K/UL (ref 1–5.1)
LYMPHOCYTES RELATIVE PERCENT: 20 %
MCH RBC QN AUTO: 33.2 PG (ref 26–34)
MCHC RBC AUTO-ENTMCNC: 32.9 G/DL (ref 31–36)
MCV RBC AUTO: 100.8 FL (ref 80–100)
MONOCYTES ABSOLUTE: 0.7 K/UL (ref 0–1.3)
MONOCYTES RELATIVE PERCENT: 13 %
NEUTROPHILS ABSOLUTE: 3.4 K/UL (ref 1.7–7.7)
NEUTROPHILS RELATIVE PERCENT: 63.1 %
PDW BLD-RTO: 21.3 % (ref 12.4–15.4)
PERFORMED ON: ABNORMAL
PERFORMED ON: NORMAL
PERFORMED ON: NORMAL
PHOSPHORUS: 4.6 MG/DL (ref 2.5–4.9)
PLATELET # BLD: 126 K/UL (ref 135–450)
PMV BLD AUTO: 10.4 FL (ref 5–10.5)
POTASSIUM REFLEX MAGNESIUM: 4.3 MMOL/L (ref 3.5–5.1)
POTASSIUM SERPL-SCNC: 4.3 MMOL/L (ref 3.5–5.1)
RBC # BLD: 2.16 M/UL (ref 4–5.2)
SODIUM BLD-SCNC: 147 MMOL/L (ref 136–145)
WBC # BLD: 5.3 K/UL (ref 4–11)

## 2021-10-06 PROCEDURE — 1200000000 HC SEMI PRIVATE

## 2021-10-06 PROCEDURE — 2580000003 HC RX 258: Performed by: INTERNAL MEDICINE

## 2021-10-06 PROCEDURE — 36415 COLL VENOUS BLD VENIPUNCTURE: CPT

## 2021-10-06 PROCEDURE — 96372 THER/PROPH/DIAG INJ SC/IM: CPT

## 2021-10-06 PROCEDURE — 74230 X-RAY XM SWLNG FUNCJ C+: CPT

## 2021-10-06 PROCEDURE — 6360000002 HC RX W HCPCS: Performed by: INTERNAL MEDICINE

## 2021-10-06 PROCEDURE — 99233 SBSQ HOSP IP/OBS HIGH 50: CPT | Performed by: INTERNAL MEDICINE

## 2021-10-06 PROCEDURE — 92610 EVALUATE SWALLOWING FUNCTION: CPT

## 2021-10-06 PROCEDURE — 80069 RENAL FUNCTION PANEL: CPT

## 2021-10-06 PROCEDURE — 6370000000 HC RX 637 (ALT 250 FOR IP): Performed by: INTERNAL MEDICINE

## 2021-10-06 PROCEDURE — 92611 MOTION FLUOROSCOPY/SWALLOW: CPT

## 2021-10-06 PROCEDURE — 85025 COMPLETE CBC W/AUTO DIFF WBC: CPT

## 2021-10-06 RX ADMIN — VALPROIC ACID 130 MG: 250 SOLUTION ORAL at 14:30

## 2021-10-06 RX ADMIN — HEPARIN SODIUM 5000 UNITS: 5000 INJECTION INTRAVENOUS; SUBCUTANEOUS at 22:11

## 2021-10-06 RX ADMIN — INSULIN LISPRO 2 UNITS: 100 INJECTION, SOLUTION INTRAVENOUS; SUBCUTANEOUS at 13:11

## 2021-10-06 RX ADMIN — ASPIRIN 81 MG: 81 TABLET, CHEWABLE ORAL at 10:41

## 2021-10-06 RX ADMIN — INSULIN GLARGINE 27 UNITS: 100 INJECTION, SOLUTION SUBCUTANEOUS at 13:11

## 2021-10-06 RX ADMIN — VALPROIC ACID 130 MG: 250 SOLUTION ORAL at 22:11

## 2021-10-06 RX ADMIN — MIDODRINE HYDROCHLORIDE 10 MG: 5 TABLET ORAL at 13:35

## 2021-10-06 RX ADMIN — ATORVASTATIN CALCIUM 80 MG: 80 TABLET, FILM COATED ORAL at 22:11

## 2021-10-06 RX ADMIN — HEPARIN SODIUM 5000 UNITS: 5000 INJECTION INTRAVENOUS; SUBCUTANEOUS at 06:23

## 2021-10-06 RX ADMIN — MIDODRINE HYDROCHLORIDE 10 MG: 5 TABLET ORAL at 10:41

## 2021-10-06 RX ADMIN — FAMOTIDINE 10 MG: 20 TABLET, FILM COATED ORAL at 10:40

## 2021-10-06 RX ADMIN — Medication 10 ML: at 22:11

## 2021-10-06 RX ADMIN — OLANZAPINE 10 MG: 5 TABLET, FILM COATED ORAL at 22:10

## 2021-10-06 RX ADMIN — Medication 10 ML: at 10:41

## 2021-10-06 RX ADMIN — MIDODRINE HYDROCHLORIDE 10 MG: 5 TABLET ORAL at 17:14

## 2021-10-06 RX ADMIN — HEPARIN SODIUM 5000 UNITS: 5000 INJECTION INTRAVENOUS; SUBCUTANEOUS at 13:33

## 2021-10-06 RX ADMIN — VALPROIC ACID 130 MG: 250 SOLUTION ORAL at 10:56

## 2021-10-06 RX ADMIN — Medication 1 TABLET: at 10:40

## 2021-10-06 RX ADMIN — INSULIN LISPRO 8 UNITS: 100 INJECTION, SOLUTION INTRAVENOUS; SUBCUTANEOUS at 17:14

## 2021-10-06 ASSESSMENT — PAIN SCALES - PAIN ASSESSMENT IN ADVANCED DEMENTIA (PAINAD)
NEGVOCALIZATION: 0
NEGVOCALIZATION: 0
FACIALEXPRESSION: 0
TOTALSCORE: 0
TOTALSCORE: 0
CONSOLABILITY: 0
BODYLANGUAGE: 0
BREATHING: 0
FACIALEXPRESSION: 0
BREATHING: 0
NEGVOCALIZATION: 0
TOTALSCORE: 0
BODYLANGUAGE: 0
FACIALEXPRESSION: 0
CONSOLABILITY: 0
BREATHING: 0
CONSOLABILITY: 0
BODYLANGUAGE: 0

## 2021-10-06 NOTE — PROGRESS NOTES
BP remains slightly soft this evening - 102/53. Pt remains very lethargic, responds to voice. Pt supposed to discharge via transport at 2130. Notified Dr. Lalo Johnston via perfectserve regarding current condition and scheduled dishcarge to see if pt should be monitored overnight. Per Vicente Duque Grief please watch overnight and dayteam provider to reassess please\". Will continue to monitor.

## 2021-10-06 NOTE — PROGRESS NOTES
Speech Language Pathology  Facility/Department: [facility]  [address] SWALLOW EVALUATION    NAME: Lj Vega  : 1950  MRN: [id number]    ADMISSION DATE: 10/4/2021  ADMITTING DIAGNOSIS: has Peripheral vascular disease ([facility] [address] Shortness of breath; Other chest pain; Diabetes mellitus ([facility] [address] Carotid stenosis; CAD (coronary artery disease); Hyperlipidemia; Vasovagal syncope; Pure hypercholesterolemia; Status post left heart catheterization; Anemia; NSTEMI (non-ST elevated myocardial infarction) [facility]); CAD S/P percutaneous coronary angioplasty; Essential hypertension; Unstable angina ([facility] [address] Fatigue; Complication associated with dialysis catheter; ESRD (end stage renal disease) ([facility] [address] Anemia in ESRD (end-stage renal disease) ([facility] [address] and Electrolyte imbalance on their problem list.  ONSET DATE: 10/4/21    Recent Chest Xray 10/4/21  Impression    Impression: Right basilar somewhat nodular opacity. This is new from prior. Date of Eval: 10/6/2021  Evaluating Therapist: RIO Nazario    Current Diet level:  Current Diet : Regular (prior to admit, Pt NPO with tube feeds only)  Current Liquid Diet : Thin    Primary Complaint  Patient Complaint: not able to state appropriately    Pain:  Pain Assessment  Pain Assessment: denies    Reason for Referral  Lj Vega was referred for a bedside swallow evaluation to assess the efficiency of her swallow function, identify signs and symptoms of aspiration and make recommendations regarding safe dietary consistencies, effective compensatory strategies, and safe eating environment. History Of Present Illness:   Per MD notes:  \" 79 yo F with PMH of ESRD on HD, Hx of CVA presented to the hospital with HD cath malfunction. Pt had last HD on .  Per report from nursing staff at dialysis center, they tried Cathflo 3 times with no success and due to the clogged catheter she was sent to the ER.pt unable to provide any history due to her underlying mental status \"    Impression  Dysphagia Diagnosis: Suspected needs further assessment  Dysphagia Impression : Pt alert, cooperative, oriented to this being a hospital, not oriented to time, followed simple directions. Oral structures grossly intact, pt is edentulous. RN states attempted to feed pt and coughing noted especially with liquids. Per chart and nursing home staff, pt is NPO with tube feeds only. Spoke with therapist (not speech), who reported they had no records of instrumental when pt came to them, but has been NPO. Pt analyzed with ice chips, water via tsp/cup and puree. Pt demonstrated adequate labial seal around spoon and cup, no anterior loss. Pt with congested sounding cough, did not expectorate any secretions. Vocal quality slightly wet, swallow movement was felt upon palpation of anterior neck. Recommend MBS to fully assess swallow physiology and determine ability to take PO safely, as pt has been NPO (staff at [facility] stated order for NPO has been since 9/23 when admitted to their facility). Dysphagia Outcome Severity Scale: Level 2: Moderate Severe dysphagia- Maximum assistance or maximum use of strategies with partial PO only     Treatment Plan  Requires SLP Intervention: Yes  Duration/Frequency of Treatment: TBD after MBS  D/C Recommendations: To be determined     Recommended Diet and Intervention  Diet Recommendation:  (TBD after MBS)  Recommended Form of Meds: Via alternative means of nutrition (until MBS completed)  Recommendations: Modified barium swallow study  Therapeutic Interventions: Oral care; Patient/Family education    Compensatory Swallowing Strategies   TBD after MBS    Treatment/Goals  1- The patient will tolerate instrumental swallowing procedure    General  Chart Reviewed: Yes  Behavior/Cognition: Alert; Cooperative  Respiratory Status: Room air  Communication Observation: Functional  Follows Directions: Simple  Dentition: Edentulous  Patient Positioning: Upright in bed  Baseline Vocal Quality:  (slightly wet)  Prior Dysphagia History: No formal instrumental evaluations located. Spoke with staff at [facility], they state pt came to them from hospital NPO with tube feeds, no evidence of instrumental completed  Consistencies Administered: Dysphagia Pureed (Dysphagia I); Ice Chips; Thin - teaspoon; Thin - cup    Vision/Hearing  Vision  Vision: Within Functional Limits  Hearing  Hearing: Within functional limits    Oral Motor Deficits  Oral/Motor  Oral Motor: Within functional limits    Oral Phase Dysfunction  Pt demonstrated adequate labial seal around spoon and cup, no anterior loss. Indicators of Pharyngeal Phase Dysfunction   Pt with congested sounding cough, did not expectorate any secretions. Vocal quality slightly wet, swallow movement was felt upon palpation of anterior neck. Prognosis  Prognosis  Prognosis for safe diet advancement: fair  Individuals consulted  Consulted and agree with results and recommendations: Patient;RN    Education  Patient Education: Pt educated to purpose of visit  Patient Education Response: Needs reinforcement  Safety Devices in place: Yes  Type of devices: Call light within reach       Therapy Time  SLP Individual Minutes  Time In: 0110  Time Out: 0130  Minutes: 20     Plan:   Recommended diet:  TBD after MBS  MBS today  Pt therapy goal: not able to state  Pt dc goal: not able to state    Jairon Miller M.S./[facility]-SLP #[id number]  Pg.  # [phone]  Needs met prior to leaving room, call light within reach, d/w HENRY Montalvo  This document will serve as a dc summary if pt dc prior to next visit  10/6/2021 1:41 PM

## 2021-10-06 NOTE — PROGRESS NOTES
Office : [phone]     Fax :[phone]         Renal Progress Note  Subjective:   Admit Date: 10/4/2021     Sade Fonseca is a 80 y.o. female with PMH ESRD on HD MWF, anemia, HTN, dementia, CAD, stroke, gastric tube in place and NPO with colostomy in place. Patient presented to the ED on 10/04/21 from her dialysis center with a clogged dialysis catheter. It was reported that nursing staff at the dialysis center administered cathflo x 3 without any successful flow through the catheter. Patient subsequently presented to the ED afebrile and with vital signs within normal limits. Patient was admitted to the hospital for dialysis catheter replacement. Upon arrival patient had a right IJ tunneled catheter in place. Patient underwent catheter exchange but with complications during dialysis yesterday including reported hypotension with systolic pressures in the 60s with a change in mental status from baseline. Patient's hypotension and change in mental status from baseline resolved. This AM patient was lying in bed and alert and responsive as she presented yesterday morning at previous encounter. ROS was limited due to patient's PMH of dementia. DIET ADULT DIET;  Regular  Medications:   Scheduled Meds:   heparin (porcine)  5,000 Units SubCUTAneous 3 times per day    aspirin  81 mg Per G Tube Daily    famotidine  10 mg Per G Tube Daily    atorvastatin  80 mg Per G Tube Nightly    OLANZapine  10 mg Per G Tube Nightly    valproic acid  130 mg Per G Tube TID    midodrine  10 mg Per G Tube TID WC    sodium chloride flush  5-40 mL IntraVENous 2 times per day    insulin glargine  27 Units SubCUTAneous BID    insulin lispro  0-12 Units SubCUTAneous TID WC    insulin lispro  0-6 Units SubCUTAneous Nightly    b complex-C-E-zinc  1 tablet Per G Tube Daily     Continuous Infusions:   sodium chloride      dextrose         Labs:  CBC:   Recent Labs     10/04/21  1607 10/05/21  0718   WBC 11.6* 6.1   HGB 9.2* 8.0*    130*     BMP:    Recent Labs     10/04/21  1607 10/05/21  0718   * 146*   K 5.1 5.3*   * 110   CO2 24 24   BUN 93* 101*   CREATININE 2.3* 2.6*   GLUCOSE 191* 208*     Ca/Mg/Phos:   Recent Labs     10/04/21  1607 10/05/21  0718   CALCIUM 9.6 9.2     Hepatic: No results for input(s): AST, ALT, ALB, BILITOT, ALKPHOS in the last 72 hours. Troponin: No results for input(s): TROPONINI in the last 72 hours. BNP: No results for input(s): BNP in the last 72 hours. Lipids: No results for input(s): CHOL, TRIG, HDL, LDLCALC, LABVLDL in the last 72 hours. ABGs: No results for input(s): PHART, PO2ART, [id number] in the last 72 hours. INR:   Recent Labs     10/05/21  0930   INR 1.03     UA:No results for input(s): Martha Herder, GLUCOSEU, BILIRUBINUR, Sarah Nay, BLOODU, PHUR, PROTEINU, UROBILINOGEN, NITRU, LEUKOCYTESUR, Oletha Gash in the last 72 hours. Urine Microscopic: No results for input(s): LABCAST, BACTERIA, COMU, HYALCAST, WBCUA, RBCUA, EPIU in the last 72 hours. Urine Culture: No results for input(s): LABURIN in the last 72 hours. Urine Chemistry: No results for input(s): Sallyanne Ly, PROTEINUR, NAUR in the last 72 hours.     Objective:   Vitals: BP 94/61   Pulse 95   Temp 97.1 °F (36.2 °C) (Oral)   Resp 18   Ht 5' 1\" (1.549 m)   Wt 116 lb 2.9 oz (52.7 kg)   LMP  (LMP Unknown)   SpO2 100%   BMI 21.95 kg/m²    Wt Readings from Last 3 Encounters:   10/05/21 116 lb 2.9 oz (52.7 kg)   08/05/20 95 lb 1.6 oz (43.1 kg)   07/08/20 97 lb 12.8 oz (44.4 kg)      24HR INTAKE/OUTPUT:      Intake/Output Summary (Last 24 hours) at 10/6/2021 1100  Last data filed at 10/5/2021 1755  Gross per 24 hour   Intake 700 ml   Output 200 ml   Net 500 ml         IMAGING:  IR REPLACE TUNNELED CVC WO SQ PORT/PUMP SAME ACCESS   Final Result   Impression:      Successful tunneled dialysis catheter exchange. The new catheter tip is in improved position near the cavoatrial junction. The catheter is ready for immediate use. XR CHEST PORTABLE   Final Result    Impression: Right basilar somewhat nodular opacity. This is new from prior. Assessment and Plan:       Assessment/Plan     1. ESRD on HD MWF with dialysis catheter replaced     2. HTN     3.  Anemia     4. Acid- base/ Electrolyte imbalance      ___________________  -dialysis catheter exchanged yesterday   -AMS and hypotension at last dialysis with resolution   -obtain serum electrolytes  -monitor BP  -dialysis for tomorrow  -monitor for any complications with dialysis tomorrow  -Anemia + MBD management          Kaitlyn Lubin   Medical Student   MS-4      Acted as my scribe      Yash Casas MD

## 2021-10-06 NOTE — PROGRESS NOTES
Patient did not tolerate dialysis well yesterday. We will try dialysis again in the morning.   If he tolerated dialysis well tomorrow she can be discharged back to nursing home

## 2021-10-06 NOTE — PROGRESS NOTES
Hospitalist Progress Note      PCP: Kayden Prieto    Date of Admission: 10/4/2021    Chief Complaint: Clotted HD catheter    Hospital Course: 81 yo F with PMH of ESRD on HD, Hx of CVA presented to the hospital with HD cath malfunction. Subjective: Patient underwent new HD catheter placement yesterday. While undergoing dialysis patient became hypotensive and systolic blood pressure dropped to 60s. Her hemodialysis was stopped. Patient was also noted to be altered and slightly drowsy. her discharge was canceled for overnight observation      Medications:  Reviewed    Infusion Medications    sodium chloride      dextrose       Scheduled Medications    heparin (porcine)  5,000 Units SubCUTAneous 3 times per day    aspirin  81 mg Per G Tube Daily    famotidine  10 mg Per G Tube Daily    atorvastatin  80 mg Per G Tube Nightly    OLANZapine  10 mg Per G Tube Nightly    valproic acid  130 mg Per G Tube TID    midodrine  10 mg Per G Tube TID WC    sodium chloride flush  5-40 mL IntraVENous 2 times per day    insulin glargine  27 Units SubCUTAneous BID    insulin lispro  0-12 Units SubCUTAneous TID WC    insulin lispro  0-6 Units SubCUTAneous Nightly    b complex-C-E-zinc  1 tablet Per G Tube Daily     PRN Meds: albumin human, heparin (porcine), sodium chloride flush, sodium chloride, ondansetron **OR** ondansetron, polyethylene glycol, acetaminophen **OR** acetaminophen, glucose, dextrose, glucagon (rDNA), dextrose      Intake/Output Summary (Last 24 hours) at 10/6/2021 1129  Last data filed at 10/5/2021 1755  Gross per 24 hour   Intake 700 ml   Output 200 ml   Net 500 ml       Physical Exam Performed:    BP 94/61   Pulse 95   Temp 97.1 °F (36.2 °C) (Oral)   Resp 18   Ht 5' 1\" (1.549 m)   Wt 116 lb 2.9 oz (52.7 kg)   LMP  (LMP Unknown)   SpO2 100%   BMI 21.95 kg/m²     General appearance: Appears awake and alert, fatigued  HEENT: Pupils equal, round, and reactive to light. Conjunctivae/corneas clear. Neck: Supple, with full range of motion. No jugular venous distention. Trachea midline. Respiratory:  Normal respiratory effort. Clear to auscultation, bilaterally without Rales/Wheezes/Rhonchi. Cardiovascular: Regular rate and rhythm with normal S1/S2 without murmurs, rubs or gallops. Abdomen: Soft, non-tender, non-distended with normal bowel sounds. Musculoskeletal: No clubbing, cyanosis or edema bilaterally. Full range of motion without deformity. Skin: Skin color, texture, turgor normal.  No rashes or lesions. Neurologic: Generalized weakness without any focal motor deficits  Psychiatric: Alert and alert  Capillary Refill: Brisk,3 seconds, normal   Peripheral Pulses: +2 palpable, equal bilaterally       Labs:   Recent Labs     10/04/21  1607 10/05/21  0718 10/06/21  1011   WBC 11.6* 6.1 5.3   HGB 9.2* 8.0* 7.2*   HCT 28.4* 24.3* 21.8*    130* 126*     Recent Labs     10/04/21  1607 10/05/21  0718 10/06/21  1011   * 146* 147*   K 5.1 5.3* 4.3   * 110 110   CO2 24 24 24   BUN 93* 101* 69*   CREATININE 2.3* 2.6* 2.1*   CALCIUM 9.6 9.2 8.9   PHOS  --   --  4.6     No results for input(s): AST, ALT, BILIDIR, BILITOT, ALKPHOS in the last 72 hours. Recent Labs     10/05/21  0930   INR 1.03     No results for input(s): Prentis Revels in the last 72 hours. Urinalysis:      Lab Results   Component Value Date    NITRU Negative 10/11/2019    WBCUA 0-2 10/11/2019    RBCUA 0-2 10/11/2019    BLOODU Negative 10/11/2019    SPECGRAV 1.014 10/11/2019    GLUCOSEU 250 10/11/2019       Radiology:  IR REPLACE TUNNELED CVC WO SQ PORT/PUMP SAME ACCESS   Final Result   Impression:      Successful tunneled dialysis catheter exchange. The new catheter tip is in improved position near the cavoatrial junction. The catheter is ready for immediate use. XR CHEST PORTABLE   Final Result    Impression: Right basilar somewhat nodular opacity. This is new from prior. Assessment/Plan:    Active Hospital Problems    Diagnosis     ESRD (end stage renal disease) ([facility] [address] [N18.6]     Anemia in ESRD (end-stage renal disease) ([facility] [address] [N18.6, D63.1]     Electrolyte imbalance [P72.3]     Complication associated with dialysis catheter [T82. 9XXA]      Clogged dialysis catheter  Hypotension while undergoing dialysis-resolved  ESRD on HD  Hypernatremia  Anemia of CKD  Hx of CVA  Diabetes mellitus     Plan:  -Nephrology following  -Status post HD catheter exchange  -ISS, lantus  -continue home meds    DVT Prophylaxis: Subcu heparin  Diet: ADULT DIET; Regular  Code Status: Full Code    Dispo -nephrology would like to observe the patient 1 more day and repeat dialysis tomorrow.   If she is able to tolerate dialysis tomorrow then she can be discharged    Satinder Espino MD

## 2021-10-06 NOTE — PROGRESS NOTES
BP improved overnight, last /68. Other VS have remained stable. Pt very lethargic overnight, this AM pt slightly more alert and answering simple questions intermittently. Small amount of old drainage noted to right vascath site, dressing reinforced. No new drainage noted. Fall precautions remain in place. Will continue to monitor.

## 2021-10-06 NOTE — PROCEDURES
INSTRUMENTAL SWALLOW REPORT  MODIFIED BARIUM SWALLOW    NAME: Ramonita Riedel   : 1950  MRN: [id number]       Date of Eval: 10/6/2021     Ordering Physician: Dr. Mario Murdock  Radiologist:      Referring Diagnosis(es): Referring Diagnosis: dysphagia    Past Medical History:  has a past medical history of CAD (coronary artery disease), Carotid artery disease ([facility] [address] Diabetes mellitus ([facility] [address] Hyperlipidemia, Lung mass, and Syncope. Past Surgical History:  has a past surgical history that includes Cardiac surgery (); Cholecystectomy; Breast surgery; Coronary artery bypass graft; knee surgery; Upper gastrointestinal endoscopy (N/A, 10/14/2019); Diagnostic Cardiac Cath Lab Procedure; and Coronary angioplasty with stent (). Current Diet Solid Consistency: Regular (however pt has been NPO with tube feeds prior to admit)  Current Diet Liquid Consistency: Thin  Type of Study: Initial MBS     Recent Chest Xray 10/4/21  Impression    Impression: Right basilar somewhat nodular opacity. This is new from prior.      Patient Complaints/Reason for Referral:  Ramonita Riedel was referred for a MBS to assess the efficiency of his/her swallow function, assess for aspiration, and to make recommendations regarding safe dietary consistencies, effective compensatory strategies, and safe eating environment. Onset of problem:   Date of Onset: 21    Behavior/Cognition/Vision/Hearing:  Behavior/Cognition: Alert; Cooperative  Vision: Within Functional Limits  Hearing: Within functional limits    Impressions:  Pt exhibiting mod-severe oropharyngeal dysphagia. Pt exhibits poor posterior propulsion of bolus with piecemeal swallow, especially with solid texture. Pt is edentulous, which also contributes to this. Swallow initiation significantly delayed with bolus spilling over base of tongue into valleculae and pyriforms.   With thin and nectar thick liquids, bolus intermittently spilled over from pyriforms into airway, resulting in aspiration with no cough reflex. Pt was not able to produce an effective volitional cough to clear and could not follow other strategies. Material cleared from valleculae and pyriforms with initiation of swallow. No penetration or aspiration noted with puree and honey thick liquids. Treatment Dx and ICD 10: oropharyngeal dysphagia   Patient Position: Lateral and Patient Degrees: 90  Consistencies Administered: Thin teaspoon;Dysphagia Pureed (Dysphagia I); Reg solid; Honey teaspoon;Honey straw  Dysphagia Outcome Severity Scale: Level 3: Moderate dysphagia- Total assisstance, supervision or strategies.  Two or more diet consistencies restricted  Penetration-Aspiration Scale (PAS): 7 - Material enters the airway, passes below the vocal folds, and is not ejected from the trachea despite effort    Recommended Diet: (pt was NPO with feeding tube prior to admit)  Solid consistency: Dysphagia Pureed (Dysphagia I)  Liquid consistency: Moderately Thick (Honey)  Liquid administration via: Cup;Straw  Medication administration: Meds in puree    Safe Swallow Protocol:  Supervision: Close  Upright as possible for all oral intake  Eat/Feed slowly  Remain upright for 30-45 minutes after meals  Alternate consistencies    Recommendations/Treatment  Requires SLP Intervention: Yes  D/C Recommendations:  (ongoing treatment indicated)    Recommended Exercises: effortful swallow; bolus control exercises   Therapeutic Interventions: Oral care;Diet tolerance monitoring;Patient/Family education    Education: Images and recommendations were reviewed with pt following this exam.   Patient Education: pt educated to results of MBS  Patient Education Response: Needs reinforcement    Prognosis for safe diet advancement: fair  Barriers to reach goals: severity of dysphagia  Duration/Frequency of Treatment  Duration/Frequency of Treatment: 3-5 x    Goals:    1- The patient will tolerate instrumental swallowing procedure  10/6: goal met    New goal:  2-  Pt will consume PO safely without signs or symptoms of aspiration  3- Pt will participate in swallowing exercises to improve safety of swallow      Oral Preparation / Oral Phase  Pt exhibits poor posterior propulsion of bolus with piecemeal swallow, especially with solid texture. Pt is edentulous, which also contributes to this. Pharyngeal Phase  Swallow initiation significantly delayed with bolus spilling over base of tongue into valleculae and pyriforms. With thin and nectar thick liquids, bolus intermittently spilled over from pyriforms into airway, resulting in aspiration with no cough reflex. Pt was not able to produce an effective volitional cough to clear and could not follow other strategies. Material cleared from valleculae and pyriforms with initiation of swallow. No penetration or aspiration noted with puree and honey thick liquids.      Esophageal Phase  Appears to clear UES adequately    Pain   Patient Currently in Pain: No     Therapy Time:   Individual Concurrent Group Co-treatment   Time In 0215         Time Out 0235         Minutes 20            Plan:  Recommended diet: puree/honey thick liquids  Dc recommendation: ongoing treatment indicated  Needs met prior to leaving room, call light within reach, d/w HENRY Loera  This document will serve as a dc summary if pt dc prior to next visit  10/6/2021, 2:52 PM

## 2021-10-07 VITALS
DIASTOLIC BLOOD PRESSURE: 64 MMHG | WEIGHT: 113.32 LBS | TEMPERATURE: 97.8 F | SYSTOLIC BLOOD PRESSURE: 104 MMHG | OXYGEN SATURATION: 100 % | RESPIRATION RATE: 17 BRPM | HEIGHT: 61 IN | BODY MASS INDEX: 21.39 KG/M2 | HEART RATE: 100 BPM

## 2021-10-07 LAB
ALBUMIN SERPL-MCNC: 3.5 G/DL (ref 3.4–5)
ANION GAP SERPL CALCULATED.3IONS-SCNC: 12 MMOL/L (ref 3–16)
BUN BLDV-MCNC: 62 MG/DL (ref 7–20)
CALCIUM SERPL-MCNC: 8.7 MG/DL (ref 8.3–10.6)
CHLORIDE BLD-SCNC: 110 MMOL/L (ref 99–110)
CO2: 24 MMOL/L (ref 21–32)
CREAT SERPL-MCNC: 2.2 MG/DL (ref 0.6–1.2)
GFR AFRICAN AMERICAN: 27
GFR NON-AFRICAN AMERICAN: 22
GLUCOSE BLD-MCNC: 105 MG/DL (ref 70–99)
GLUCOSE BLD-MCNC: 121 MG/DL (ref 70–99)
GLUCOSE BLD-MCNC: 123 MG/DL (ref 70–99)
GLUCOSE BLD-MCNC: 124 MG/DL (ref 70–99)
HCT VFR BLD CALC: 22.3 % (ref 36–48)
HEMOGLOBIN: 7.4 G/DL (ref 12–16)
HEPATITIS B SURFACE ANTIGEN INTERPRETATION: NORMAL
MCH RBC QN AUTO: 33.1 PG (ref 26–34)
MCHC RBC AUTO-ENTMCNC: 33 G/DL (ref 31–36)
MCV RBC AUTO: 100.2 FL (ref 80–100)
PDW BLD-RTO: 21.3 % (ref 12.4–15.4)
PERFORMED ON: ABNORMAL
PHOSPHORUS: 5.6 MG/DL (ref 2.5–4.9)
PLATELET # BLD: 136 K/UL (ref 135–450)
PMV BLD AUTO: 10.7 FL (ref 5–10.5)
POTASSIUM SERPL-SCNC: 4.2 MMOL/L (ref 3.5–5.1)
RBC # BLD: 2.23 M/UL (ref 4–5.2)
SODIUM BLD-SCNC: 146 MMOL/L (ref 136–145)
WBC # BLD: 4.5 K/UL (ref 4–11)

## 2021-10-07 PROCEDURE — 86704 HEP B CORE ANTIBODY TOTAL: CPT

## 2021-10-07 PROCEDURE — 90935 HEMODIALYSIS ONE EVALUATION: CPT

## 2021-10-07 PROCEDURE — 6370000000 HC RX 637 (ALT 250 FOR IP): Performed by: INTERNAL MEDICINE

## 2021-10-07 PROCEDURE — P9047 ALBUMIN (HUMAN), 25%, 50ML: HCPCS | Performed by: INTERNAL MEDICINE

## 2021-10-07 PROCEDURE — 2580000003 HC RX 258: Performed by: INTERNAL MEDICINE

## 2021-10-07 PROCEDURE — 6360000002 HC RX W HCPCS: Performed by: INTERNAL MEDICINE

## 2021-10-07 PROCEDURE — 85027 COMPLETE CBC AUTOMATED: CPT

## 2021-10-07 PROCEDURE — 90935 HEMODIALYSIS ONE EVALUATION: CPT | Performed by: INTERNAL MEDICINE

## 2021-10-07 PROCEDURE — 87340 HEPATITIS B SURFACE AG IA: CPT

## 2021-10-07 PROCEDURE — 36415 COLL VENOUS BLD VENIPUNCTURE: CPT

## 2021-10-07 PROCEDURE — 80069 RENAL FUNCTION PANEL: CPT

## 2021-10-07 RX ADMIN — Medication 1 TABLET: at 12:09

## 2021-10-07 RX ADMIN — MIDODRINE HYDROCHLORIDE 10 MG: 5 TABLET ORAL at 08:08

## 2021-10-07 RX ADMIN — HEPARIN SODIUM 3600 UNITS: 1000 INJECTION INTRAVENOUS; SUBCUTANEOUS at 11:28

## 2021-10-07 RX ADMIN — ACETAMINOPHEN 650 MG: 325 TABLET ORAL at 10:11

## 2021-10-07 RX ADMIN — ASPIRIN 81 MG: 81 TABLET, CHEWABLE ORAL at 12:08

## 2021-10-07 RX ADMIN — HEPARIN SODIUM 5000 UNITS: 5000 INJECTION INTRAVENOUS; SUBCUTANEOUS at 14:32

## 2021-10-07 RX ADMIN — VALPROIC ACID 130 MG: 250 SOLUTION ORAL at 14:31

## 2021-10-07 RX ADMIN — ALBUMIN (HUMAN) 25 G: 0.25 INJECTION, SOLUTION INTRAVENOUS at 09:09

## 2021-10-07 RX ADMIN — MIDODRINE HYDROCHLORIDE 10 MG: 5 TABLET ORAL at 12:09

## 2021-10-07 RX ADMIN — FAMOTIDINE 10 MG: 20 TABLET, FILM COATED ORAL at 12:09

## 2021-10-07 RX ADMIN — Medication 10 ML: at 08:08

## 2021-10-07 RX ADMIN — HEPARIN SODIUM 5000 UNITS: 5000 INJECTION INTRAVENOUS; SUBCUTANEOUS at 06:24

## 2021-10-07 ASSESSMENT — PAIN SCALES - PAIN ASSESSMENT IN ADVANCED DEMENTIA (PAINAD)
CONSOLABILITY: 0
TOTALSCORE: 0
NEGVOCALIZATION: 0
FACIALEXPRESSION: 0
CONSOLABILITY: 0
BODYLANGUAGE: 0
BREATHING: 0
BREATHING: 0
NEGVOCALIZATION: 0
NEGVOCALIZATION: 0
TOTALSCORE: 0
BREATHING: 0
BREATHING: 0
CONSOLABILITY: 0
FACIALEXPRESSION: 0
BREATHING: 0
BODYLANGUAGE: 0
BODYLANGUAGE: 0
FACIALEXPRESSION: 0
NEGVOCALIZATION: 0
FACIALEXPRESSION: 0
BODYLANGUAGE: 0
TOTALSCORE: 0
BODYLANGUAGE: 0
TOTALSCORE: 0
NEGVOCALIZATION: 0
TOTALSCORE: 0
FACIALEXPRESSION: 0

## 2021-10-07 ASSESSMENT — PAIN SCALES - GENERAL: PAINLEVEL_OUTOF10: 4

## 2021-10-07 NOTE — PROGRESS NOTES
Dialysis called for patient at [phone]. Patient assessed, vital signs taken and stable, midodrine given. Patient taken to dialysis at 0810.

## 2021-10-07 NOTE — CARE COORDINATION
Case Management Assessment            Discharge Note                    Date / Time of Note: 10/7/2021 10:46 AM                  Discharge Note Completed by: Maxime Payton RN    Patient Name: Katty Watkins   YOB: 1950  Diagnosis: Complication associated with dialysis catheter [T82. 9XXA]   Date / Time: 10/4/2021  3:18 PM    Current PCP: [facility] patient: No    Hospitalization in the last 30 days: No    Advance Directives:  Code Status: Full Code  [place] DNR form completed and on chart: No    Financial:  Payor: MEDICARE / Plan: MEDICARE PART A AND B / Product Type: *No Product type* /      Pharmacy:    [address] [place] [address], Critical access hospital0 [facility] 5[address]  [place] [phone]  [address] [place] [address] [address]  [place] [address]  Phone: [phone] Fax: [phone]    [facility]2 [facility], [address] [facility] [place] [phone] [facility] [phone]  [address] [facility] [address]  Phone: [phone] Fax: [facility] 44 142 [facility], [address] [address] [phone]  [address] [place] [address] [address] [place] [address] [place] [address]  Phone: [phone] Fax: [phone]      Assistance purchasing medications?:    Assistance provided by Case Management: None at this time    Does patient want to participate in local refill/ meds to beds program?:      Meds To Beds General Rules:  1. Can ONLY be done Monday- Friday between 8:30am-5pm  2. Prescription(s) must be in pharmacy by 3pm to be filled same day  3. Copy of patient's insurance/ prescription drug card and patient face sheet must be sent along with the prescription(s)  4. Cost of Rx cannot be added to hospital bill. If financial assistance is needed, please contact unit  or ;  or  CANNOT provide pharmacy voucher for patients co-pays  5.  Patients can then  the prescription on their way out of the hospital at discharge, or pharmacy can deliver to the bedside if staff is available. (payment due at time of pick-up or delivery - cash, check, or card accepted)     Able to afford home medications/ co-pay costs: Yes    ADLS:  Current PT AM-PAC Score:   /24  Current OT AM-PAC Score:   /24      DISCHARGE Disposition:    [facility]               [address], [address] [facility] [address]      REPORT Phone: [phone]      FAX Fax: [phone]            LOC at discharge: [address] Completed: Yes    Notification completed in HENS/PAS?:  Not Applicable    IMM Completed:   Not Indicated    Transportation:  Transportation PLAN for discharge: EMS transportation   Mode of Transport: Ambulance stretcher - BLS  Reason for medical transport: Bed confined: Meets the following criteria 1) unable to get out of bed without assistance or ambulate, 2) unable to safely sit up in a wheelchair, 3) unable to maintain erect seating position in a chair for time needed for transport  Name of Transport Company: [id number]  Phone: [phone]  Time of Transport: 2000    Transport form completed: Yes    Home Care:  [address] ordered at discharge: No  [address]: Not Applicable  Orders faxed: No    Durable Medical Equipment:  DME Provider: defer  Equipment obtained during hospitalization: defer    Home Oxygen and Respiratory Equipment:  Oxygen needed at discharge?: Not [address]: Not Applicable  Portable tank available for discharge?: No    Dialysis:  Dialysis patient: Yes    Dialysis Center:  Not Applicable  At  HD  Facility in house  ;      Hospice Services:  Location: Not Applicable  Agency: Not Applicable    Consents signed: Not Indicated    Referrals made at [facility] for outpatient continued care:  Not Applicable    Additional CM Notes:     CM  Called  And confirmed  patient from [address] can return later this afternoon after her   HD run :   -   CM spoke with

## 2021-10-07 NOTE — DISCHARGE SUMMARY
IV and telemetry removed, patient stable. Transport company took patient to facility. Report called to [address] [place] [address], spoke to Batsheva Cantor. Questions answered.

## 2021-10-07 NOTE — PLAN OF CARE
Vitals have remained stable overnight. BP stable. Pt mostly nonverbal, responds to simple questions intermittently. Pt has rested well overnight. No acute events. Fall precautions and hourly rounding in place. Will continue to monitor. Problem: Skin Integrity:  Goal: Absence of new skin breakdown  Description: Absence of new skin breakdown  Outcome: Ongoing  Note: Pt has redness to her felipa area, buttocks, and heels. Pt also has a surgical scar on her abdomen. Mepilex in place to heels bilaterally. Purewick in place for moisture prevention. Pt turned and repositioned q2 and as needed. Heels elevated off bed. Pt resting on specialty mattress. No new signs of skin breakdown noted. Will continue to monitor. Problem: Falls - Risk of:  Goal: Will remain free from falls  Description: Will remain free from falls  Outcome: Ongoing  Note: Fall precautions in place. Bed locked in lowest position with side rails up x3. Bed exit alarm in use. Call light and personal belongings within reach. Hourly rounding in place.

## 2021-10-07 NOTE — PROGRESS NOTES
Patient picked up from dialysis. Vitals stable. Patient alert and oriented to self, situation and hospital in [place]. Disoriented to time. Resting in bed.

## 2021-10-07 NOTE — PLAN OF CARE
Problem: Skin Integrity:  Goal: Will show no infection signs and symptoms  Description: Will show no infection signs and symptoms  Outcome: Ongoing   Patient kept dry, rotation every 2 hours.

## 2021-10-07 NOTE — FLOWSHEET NOTE
10/07/21 0827 10/07/21 1129   Vital Signs   BP (!) 141/63 (!) 114/50   Temp 97.8 °F (36.6 °C) 97.4 °F (36.3 °C)   Pulse 88 97   Resp 16 20     Treatment time: 3 hours    Net UF: No UF    Pre weight: 51.4 kg (bed scale)  Post weight: 51.4 kg (bed scale)  EDW: 58 kg    Access used: RIJ HD tunneled cath  Access function: Lines reversed mid tx due to high AP. Medications or blood products given: Albumin 25 Gm IVPB. Tylenol 650 mg per PEG per Irving Candelaria RN for c/o abd pain. Regular outpatient schedule: Marty URBANO    Summary of response to treatment: Hypotension during tx, BP 76/42. Albumin 25 Gm IVPB, 400 NS given, SBP improved to 100's. Copy of dialysis treatment record placed in chart, to be scanned into EMR. Report called to Irving Candelaria RN.

## 2021-10-07 NOTE — DISCHARGE SUMMARY
Hospitalist Discharge Summary    Patient ID:  Ramonita Riedel  [id number]  80 y.o.  1950    Admit date: 10/4/2021    Discharge date: 10/7/2021    Disposition: SNF    Admission Diagnoses:   Patient Active Problem List   Diagnosis    Peripheral vascular disease ([address]    Shortness of breath    Other chest pain    Diabetes mellitus ([address]    Carotid stenosis    CAD (coronary artery disease)    Hyperlipidemia    Vasovagal syncope    Pure hypercholesterolemia    Status post left heart catheterization    Anemia    NSTEMI (non-ST elevated myocardial infarction) ([address]    CAD S/P percutaneous coronary angioplasty    Essential hypertension    Unstable angina (HCC)    Fatigue    Complication associated with dialysis catheter    [facility]    Anemia in ESRD (end-stage renal disease) [facility]    Electrolyte imbalance       Discharge Diagnoses: Active Problems:    Complication associated with dialysis catheter    ESRD (end stage renal disease) ([facility]    Anemia in ESRD (end-stage renal disease) ([facility]    Electrolyte imbalance  Resolved Problems:    * No resolved hospital problems. *      Code Status:  Full Code    Condition:  Stable    Discharge Diet: Diet:  ADULT DIET; Dysphagia - Pureed; Moderately Thick (Honey)    PCP to do list: Follow for improvement of symptoms    Hospital Course: 81 yo F with PMH of ESRD on HD, Hx of CVA presented to the hospital with HD cath malfunction. Pt had last HD on 9/28. Per report from nursing staff at dialysis center, they tried Cathflo 3 times with no success and due to the clogged catheter she was sent to the ER.pt unable to provide any history due to her underlying mental status     Clogged dialysis catheter  ESRD on HD  Hypernatremia  Anemia of CKD  Hx of CVA  Diabetes mellitus     Plan:  -Nephrology was consulted. IR was consulted for HD catheter exchange. Patient underwent new HD catheter placement.   Once patient was able to tolerate dialysis she was discharged back to her SNF.  -ISS, lantus  -continue home meds    Discharge Medications:   Current Discharge Medication List        Current Discharge Medication List        Current Discharge Medication List      CONTINUE these medications which have NOT CHANGED    Details   acetaminophen (TYLENOL) 325 MG tablet 650 mg by Per G Tube route every 4 hours as needed for Pain or Fever      insulin lispro (HUMALOG) 100 UNIT/ML injection vial Inject 0-10 Units into the skin every 6 hours Per Sliding scale      aspirin 81 MG chewable tablet 81 mg by Per G Tube route daily      vitamin B complex (NATURES BLEND B COMPLEX) TABS tablet 1 tablet by Per G Tube route daily      famotidine (PEPCID) 10 MG tablet 10 mg by Per G Tube route daily For GERD for 30 days. atorvastatin (LIPITOR) 80 MG tablet 80 mg by Per G Tube route nightly      midodrine (PROAMATINE) 10 MG tablet 10 mg 3 times daily Via G tube      OLANZapine (ZYPREXA) 10 MG tablet 10 mg by Per G Tube route nightly      insulin glargine (SEMGLEE) 100 UNIT/ML injection vial Inject 27 Units into the skin 2 times daily      valproic acid (DEPAKENE) 250 MG/5ML SOLN oral solution 130 mg by Per G Tube route 3 times daily      gabapentin (NEURONTIN) 100 MG capsule Take 100 mg by mouth 3 times daily.            Current Discharge Medication List              Procedures: HD catheter exchange    Assessment on Discharge: Stable, improved     Discharge ROS:  A complete review of systems was asked and negative except for none    Discharge Exam:  BP (!) 141/63   Pulse 88   Temp 97.8 °F (36.6 °C)   Resp 16   Ht 5' 1\" (1.549 m)   Wt 113 lb 5.1 oz (51.4 kg)   LMP  (LMP Unknown)   SpO2 100%   BMI 21.41 kg/m²     General Appearance: NAD  Skin: warm and dry, no rash or erythema  Head: normocephalic and atraumatic  Eyes: pupils equal, round, and reactive to light, extraocular eye movements intact, conjunctivae normal  ENT: tympanic membrane, external ear and ear canal normal bilaterally, nose without deformity, nasal mucosa and turbinates normal without polyps  Neck: supple and non-tender without mass, no thyromegaly or thyroid nodules, no cervical lymphadenopathy  Pulmonary/Chest: clear to auscultation bilaterally- no wheezes, rales or rhonchi, normal air movement, no respiratory distress  Cardiovascular: normal rate, regular rhythm, normal S1 and S2, no murmurs, rubs, clicks, or gallops, Peripheral pulses good, Cap refill <3 sec, no carotid bruits  Abdomen: soft, non-tender, non-distended, PEG tube in place  Extremities: no cyanosis, clubbing or edema  Musculoskeletal: normal range of motion, no joint swelling, deformity or tenderness  Neurologic: moving all extremities, unable to fully assess due to pt mental status    Pertinent Studies During Hospital Stay:  Radiology:  XR CHEST PORTABLE    Result Date: 10/4/2021  XR CHEST PORTABLE Indication: missed dialysis COMPARISON: September 10, 2021 Findings: Portable AP upright view of the chest was obtained. The right internal jugular approach tunneled dialysis catheter is unchanged terminating within the region of the mid superior vena cava. Sternotomy wires are noted. The heart is stable in size  and configuration. Bibasilar opacity is noted. Otherwise the lungs are clear. No pneumothorax or effusion. Impression: Right basilar somewhat nodular opacity. This is new from prior. XR CHEST 1 VIEW    Result Date: 9/10/2021  Chest AP portable at 2320 INDICATIONS: Tube removal, nonspecified HISTORY OF PRESENT ILLNESS: who presents  Right internal jugular dialysis catheter with its tip in the distal SVC Sternotomy sutures Cardiomegaly Small left effusion Low lung volumes with basilar atelectasis No congestive changes. 1. Low lung volumes with basilar atelectasis 2.  Right internal jugular dialysis catheter    IR REPLACE TUNNELED CVC WO SQ PORT/PUMP SAME ACCESS    Result Date: 10/5/2021  IR REPLACE TUNNELED CVC WO SQ PORT/PUMP SAME ACCESS IR tunneled central venous dialysis catheter exchange utilizing fluoroscopic guidance Indication: 72-year-old female with end-stage renal disease and malfunctioning dialysis catheter. : Dr. Anita Street Procedure: The patient was advised of the benefits, risks, and alternatives of the procedure and informed consent was obtained. A timeout was performed with verification of the patient's name, MRN, site of procedure, and type of procedure to be performed. The patient was positioned in the supine position on the angiographic table. All elements of maximal sterile barrier technique  followed including hand hygiene, cap, mask, sterile gown, sterile gloves, utilization of a large sterile sheet and 2% chlorhexidine for cutaneous antisepsis (or acceptable alternative antiseptics, per current guidelines). Fluoroscopic time: 1.3 minutes Fluoroscopic images acquired: 2  image demonstrates presence of a 19 cm tip cuff catheter with tip overlying the mid SVC. This is likely the cause of catheter malfunction. The pericatheter site was anesthetized with lidocaine. An Amplatz wire was advanced through one of the ports of the catheter into the right atrium. The cuff of the catheter was then dissected free and the catheter was partially retracted completely. I elected to not initially perform a central venogram due to patient's iodine allergy. The wire was then cleansed x3 with chlorhexidine. A new 23 cm tip to cuff 14.5 Albanian tunneled dialysis catheter was advanced over the wire. Tip was positioned at the right atrium. The wire was removed. Both ports of the catheter flush and aspirate normally. The catheter was secured to the chest with 2-0 Ethilon suture. Sterile bandage was applied. The patient tolerated the procedure well and was transferred to the recovery bay. Complications: None Estimated blood loss: <5 mL     Impression: Successful tunneled dialysis catheter exchange.  The new catheter tip is in improved position near the cavoatrial junction. The catheter is ready for immediate use. XR ABDOMEN (2 VIEWS)    Result Date: 9/10/2021  KUB INDICATIONS: Tube removal. No additional history in the electronic medical record Cholecystectomy clips Normal bowel gas pattern Lung bases are clear     1.  Normal bowel gas pattern          Last Labs on Discharge:     Recent Results (from the past 24 hour(s))   POCT Glucose    Collection Time: 10/06/21  4:03 PM   Result Value Ref Range    POC Glucose 306 (H) 70 - 99 mg/dl    Performed on ACCU-CHEK    POCT Glucose    Collection Time: 10/06/21  8:37 PM   Result Value Ref Range    POC Glucose 74 70 - 99 mg/dl    Performed on ACCU-CHEK    POCT Glucose    Collection Time: 10/06/21 11:46 PM   Result Value Ref Range    POC Glucose 88 70 - 99 mg/dl    Performed on ACCU-CHEK    CBC    Collection Time: 10/07/21  6:35 AM   Result Value Ref Range    WBC 4.5 4.0 - 11.0 K/uL    RBC 2.23 (L) 4.00 - 5.20 M/uL    Hemoglobin 7.4 (L) 12.0 - 16.0 g/dL    Hematocrit 22.3 (L) 36.0 - 48.0 %    .2 (H) 80.0 - 100.0 fL    MCH 33.1 26.0 - 34.0 pg    MCHC 33.0 31.0 - 36.0 g/dL    RDW 21.3 (H) 12.4 - 15.4 %    Platelets 187 200 - 710 K/uL    MPV 10.7 (H) 5.0 - 10.5 fL   Renal function panel    Collection Time: 10/07/21  6:35 AM   Result Value Ref Range    Sodium 146 (H) 136 - 145 mmol/L    Potassium 4.2 3.5 - 5.1 mmol/L    Chloride 110 99 - 110 mmol/L    CO2 24 21 - 32 mmol/L    Anion Gap 12 3 - 16    Glucose 105 (H) 70 - 99 mg/dL    BUN 62 (H) 7 - 20 mg/dL    CREATININE 2.2 (H) 0.6 - 1.2 mg/dL    GFR Non-African American 22 (A) >60    GFR  27 (A) >60    Calcium 8.7 8.3 - 10.6 mg/dL    Phosphorus 5.6 (H) 2.5 - 4.9 mg/dL    Albumin 3.5 3.4 - 5.0 g/dL   POCT Glucose    Collection Time: 10/07/21  7:49 AM   Result Value Ref Range    POC Glucose 124 (H) 70 - 99 mg/dl    Performed on ACCU-CHEK          Follow up: with [facility]    Note that over 30 minutes was spent in preparing discharge papers, discussing discharge with patient, medication review, etc.    Thank you [address] for the opportunity to be involved in this patient's care. If you have any questions or concerns please feel free to contact me at [phone].     Electronically signed by Nicolas Emanuel MD on 10/7/2021 at 11:19 AM

## 2021-10-08 LAB — HEPATITIS B CORE TOTAL ANTIBODY: NEGATIVE

## 2021-10-15 ENCOUNTER — APPOINTMENT (OUTPATIENT)
Dept: CT IMAGING | Age: 71
DRG: 871 | End: 2021-10-15
Payer: MEDICARE

## 2021-10-15 ENCOUNTER — HOSPITAL ENCOUNTER (INPATIENT)
Age: 71
LOS: 6 days | Discharge: NURSING, REHAB OR LONG-TERM CARE | DRG: 871 | End: 2021-10-21
Attending: EMERGENCY MEDICINE | Admitting: INTERNAL MEDICINE
Payer: MEDICARE

## 2021-10-15 ENCOUNTER — APPOINTMENT (OUTPATIENT)
Dept: GENERAL RADIOLOGY | Age: 71
DRG: 871 | End: 2021-10-15
Payer: MEDICARE

## 2021-10-15 DIAGNOSIS — D64.9 ANEMIA, UNSPECIFIED TYPE: ICD-10-CM

## 2021-10-15 DIAGNOSIS — Z99.2 ESRD NEEDING DIALYSIS (HCC): ICD-10-CM

## 2021-10-15 DIAGNOSIS — R41.82 ALTERED MENTAL STATUS, UNSPECIFIED ALTERED MENTAL STATUS TYPE: Primary | ICD-10-CM

## 2021-10-15 DIAGNOSIS — N18.6 ESRD NEEDING DIALYSIS (HCC): ICD-10-CM

## 2021-10-15 DIAGNOSIS — N30.00 ACUTE CYSTITIS WITHOUT HEMATURIA: ICD-10-CM

## 2021-10-15 PROBLEM — A41.9 SEPSIS (HCC): Status: ACTIVE | Noted: 2021-10-15

## 2021-10-15 LAB
A/G RATIO: 1.1 (ref 1.1–2.2)
ABO/RH: NORMAL
ALBUMIN SERPL-MCNC: 3 G/DL (ref 3.4–5)
ALP BLD-CCNC: 183 U/L (ref 40–129)
ALT SERPL-CCNC: 22 U/L (ref 10–40)
AMORPHOUS: ABNORMAL /HPF
ANION GAP SERPL CALCULATED.3IONS-SCNC: 15 MMOL/L (ref 3–16)
ANISOCYTOSIS: ABNORMAL
ANTIBODY SCREEN: NORMAL
APTT: 21.5 SEC (ref 26.2–38.6)
AST SERPL-CCNC: 35 U/L (ref 15–37)
BACTERIA: ABNORMAL /HPF
BANDED NEUTROPHILS RELATIVE PERCENT: 4 % (ref 0–7)
BASE EXCESS VENOUS: 5.9 MMOL/L (ref -2–3)
BASOPHILS ABSOLUTE: 0 K/UL (ref 0–0.2)
BASOPHILS RELATIVE PERCENT: 0 %
BILIRUB SERPL-MCNC: <0.2 MG/DL (ref 0–1)
BILIRUBIN URINE: NEGATIVE
BLOOD BANK DISPENSE STATUS: NORMAL
BLOOD BANK DISPENSE STATUS: NORMAL
BLOOD BANK PRODUCT CODE: NORMAL
BLOOD BANK PRODUCT CODE: NORMAL
BLOOD, URINE: ABNORMAL
BPU ID: NORMAL
BPU ID: NORMAL
BUN BLDV-MCNC: 52 MG/DL (ref 7–20)
CALCIUM SERPL-MCNC: 8.9 MG/DL (ref 8.3–10.6)
CARBOXYHEMOGLOBIN: 1.9 % (ref 0–1.5)
CHLORIDE BLD-SCNC: 82 MMOL/L (ref 99–110)
CLARITY: ABNORMAL
CO2: 26 MMOL/L (ref 21–32)
COLOR: YELLOW
CREAT SERPL-MCNC: 2.7 MG/DL (ref 0.6–1.2)
DESCRIPTION BLOOD BANK: NORMAL
DESCRIPTION BLOOD BANK: NORMAL
EKG ATRIAL RATE: 96 BPM
EKG DIAGNOSIS: NORMAL
EKG P AXIS: -80 DEGREES
EKG P-R INTERVAL: 122 MS
EKG Q-T INTERVAL: 312 MS
EKG QRS DURATION: 80 MS
EKG QTC CALCULATION (BAZETT): 394 MS
EKG R AXIS: 49 DEGREES
EKG T AXIS: 228 DEGREES
EKG VENTRICULAR RATE: 96 BPM
EOSINOPHILS ABSOLUTE: 0 K/UL (ref 0–0.6)
EOSINOPHILS RELATIVE PERCENT: 0 %
EPITHELIAL CELLS, UA: ABNORMAL /HPF (ref 0–5)
FERRITIN: 1543 NG/ML (ref 15–150)
GFR AFRICAN AMERICAN: 21
GFR NON-AFRICAN AMERICAN: 17
GLOBULIN: 2.8 G/DL
GLUCOSE BLD-MCNC: 150 MG/DL (ref 70–99)
GLUCOSE BLD-MCNC: 204 MG/DL (ref 70–99)
GLUCOSE BLD-MCNC: 308 MG/DL (ref 70–99)
GLUCOSE BLD-MCNC: 367 MG/DL (ref 70–99)
GLUCOSE URINE: NEGATIVE MG/DL
HCO3 VENOUS: 30.1 MMOL/L (ref 24–28)
HCT VFR BLD CALC: 15.6 % (ref 36–48)
HCT VFR BLD CALC: 20.3 % (ref 36–48)
HCT VFR BLD CALC: 24.4 % (ref 36–48)
HCT VFR BLD CALC: 28.4 % (ref 36–48)
HEMOGLOBIN, VEN, REDUCED: 37.3 %
HEMOGLOBIN: 5.3 G/DL (ref 12–16)
HEMOGLOBIN: 6.9 G/DL (ref 12–16)
HEMOGLOBIN: 8.2 G/DL (ref 12–16)
HEMOGLOBIN: 9.6 G/DL (ref 12–16)
HYPOCHROMIA: ABNORMAL
INR BLD: 0.95 (ref 0.88–1.12)
KETONES, URINE: NEGATIVE MG/DL
LACTIC ACID: 1.8 MMOL/L (ref 0.4–2)
LACTIC ACID: 4.3 MMOL/L (ref 0.4–2)
LEUKOCYTE ESTERASE, URINE: ABNORMAL
LYMPHOCYTES ABSOLUTE: 0.9 K/UL (ref 1–5.1)
LYMPHOCYTES RELATIVE PERCENT: 10 %
MAGNESIUM: 1.9 MG/DL (ref 1.8–2.4)
MCH RBC QN AUTO: 34.9 PG (ref 26–34)
MCHC RBC AUTO-ENTMCNC: 34.1 G/DL (ref 31–36)
MCV RBC AUTO: 102.3 FL (ref 80–100)
METAMYELOCYTES RELATIVE PERCENT: 3 %
METHEMOGLOBIN VENOUS: 0.6 % (ref 0–1.5)
MICROSCOPIC EXAMINATION: YES
MONOCYTES ABSOLUTE: 0.8 K/UL (ref 0–1.3)
MONOCYTES RELATIVE PERCENT: 9 %
MYELOCYTE PERCENT: 1 %
NEUTROPHILS ABSOLUTE: 7.4 K/UL (ref 1.7–7.7)
NEUTROPHILS RELATIVE PERCENT: 73 %
NITRITE, URINE: NEGATIVE
O2 SAT, VEN: 62 %
PCO2, VEN: 41.3 MMHG (ref 41–51)
PDW BLD-RTO: 22.4 % (ref 12.4–15.4)
PERFORMED ON: ABNORMAL
PH UA: 6 (ref 5–8)
PH VENOUS: 7.47 (ref 7.35–7.45)
PLATELET # BLD: 145 K/UL (ref 135–450)
PMV BLD AUTO: 10.1 FL (ref 5–10.5)
PO2, VEN: 33.7 MMHG (ref 25–40)
POLYCHROMASIA: ABNORMAL
POTASSIUM REFLEX MAGNESIUM: 3.5 MMOL/L (ref 3.5–5.1)
PRO-BNP: ABNORMAL PG/ML (ref 0–124)
PROTEIN UA: 100 MG/DL
PROTHROMBIN TIME: 10.7 SEC (ref 9.9–12.7)
RBC # BLD: 1.52 M/UL (ref 4–5.2)
RBC UA: ABNORMAL /HPF (ref 0–4)
SODIUM BLD-SCNC: 123 MMOL/L (ref 136–145)
SPECIFIC GRAVITY UA: 1.02 (ref 1–1.03)
TCO2 CALC VENOUS: 31 MMOL/L
TOTAL PROTEIN: 5.8 G/DL (ref 6.4–8.2)
TROPONIN: 0.81 NG/ML
TROPONIN: 0.85 NG/ML
URINE TYPE: ABNORMAL
UROBILINOGEN, URINE: 0.2 E.U./DL
WBC # BLD: 9.1 K/UL (ref 4–11)
WBC UA: ABNORMAL /HPF (ref 0–5)
YEAST: PRESENT /HPF

## 2021-10-15 PROCEDURE — 82728 ASSAY OF FERRITIN: CPT

## 2021-10-15 PROCEDURE — 2580000003 HC RX 258: Performed by: INTERNAL MEDICINE

## 2021-10-15 PROCEDURE — 87040 BLOOD CULTURE FOR BACTERIA: CPT

## 2021-10-15 PROCEDURE — 86900 BLOOD TYPING SEROLOGIC ABO: CPT

## 2021-10-15 PROCEDURE — 36430 TRANSFUSION BLD/BLD COMPNT: CPT

## 2021-10-15 PROCEDURE — 02HV33Z INSERTION OF INFUSION DEVICE INTO SUPERIOR VENA CAVA, PERCUTANEOUS APPROACH: ICD-10-PCS | Performed by: INTERNAL MEDICINE

## 2021-10-15 PROCEDURE — 96361 HYDRATE IV INFUSION ADD-ON: CPT

## 2021-10-15 PROCEDURE — 70450 CT HEAD/BRAIN W/O DYE: CPT

## 2021-10-15 PROCEDURE — 86923 COMPATIBILITY TEST ELECTRIC: CPT

## 2021-10-15 PROCEDURE — 74176 CT ABD & PELVIS W/O CONTRAST: CPT

## 2021-10-15 PROCEDURE — 81001 URINALYSIS AUTO W/SCOPE: CPT

## 2021-10-15 PROCEDURE — 30233N1 TRANSFUSION OF NONAUTOLOGOUS RED BLOOD CELLS INTO PERIPHERAL VEIN, PERCUTANEOUS APPROACH: ICD-10-PCS | Performed by: INTERNAL MEDICINE

## 2021-10-15 PROCEDURE — 85018 HEMOGLOBIN: CPT

## 2021-10-15 PROCEDURE — 2060000000 HC ICU INTERMEDIATE R&B

## 2021-10-15 PROCEDURE — 2580000003 HC RX 258: Performed by: STUDENT IN AN ORGANIZED HEALTH CARE EDUCATION/TRAINING PROGRAM

## 2021-10-15 PROCEDURE — P9016 RBC LEUKOCYTES REDUCED: HCPCS

## 2021-10-15 PROCEDURE — 71045 X-RAY EXAM CHEST 1 VIEW: CPT

## 2021-10-15 PROCEDURE — 85025 COMPLETE CBC W/AUTO DIFF WBC: CPT

## 2021-10-15 PROCEDURE — 2580000003 HC RX 258: Performed by: EMERGENCY MEDICINE

## 2021-10-15 PROCEDURE — 82803 BLOOD GASES ANY COMBINATION: CPT

## 2021-10-15 PROCEDURE — 96360 HYDRATION IV INFUSION INIT: CPT

## 2021-10-15 PROCEDURE — 2580000003 HC RX 258: Performed by: PHYSICIAN ASSISTANT

## 2021-10-15 PROCEDURE — 93005 ELECTROCARDIOGRAM TRACING: CPT | Performed by: PHYSICIAN ASSISTANT

## 2021-10-15 PROCEDURE — 83735 ASSAY OF MAGNESIUM: CPT

## 2021-10-15 PROCEDURE — 84484 ASSAY OF TROPONIN QUANT: CPT

## 2021-10-15 PROCEDURE — 85730 THROMBOPLASTIN TIME PARTIAL: CPT

## 2021-10-15 PROCEDURE — 99223 1ST HOSP IP/OBS HIGH 75: CPT | Performed by: INTERNAL MEDICINE

## 2021-10-15 PROCEDURE — 36415 COLL VENOUS BLD VENIPUNCTURE: CPT

## 2021-10-15 PROCEDURE — 6370000000 HC RX 637 (ALT 250 FOR IP): Performed by: INTERNAL MEDICINE

## 2021-10-15 PROCEDURE — 83605 ASSAY OF LACTIC ACID: CPT

## 2021-10-15 PROCEDURE — 83550 IRON BINDING TEST: CPT

## 2021-10-15 PROCEDURE — 83540 ASSAY OF IRON: CPT

## 2021-10-15 PROCEDURE — 86850 RBC ANTIBODY SCREEN: CPT

## 2021-10-15 PROCEDURE — 87086 URINE CULTURE/COLONY COUNT: CPT

## 2021-10-15 PROCEDURE — 87106 FUNGI IDENTIFICATION YEAST: CPT

## 2021-10-15 PROCEDURE — 83880 ASSAY OF NATRIURETIC PEPTIDE: CPT

## 2021-10-15 PROCEDURE — 99285 EMERGENCY DEPT VISIT HI MDM: CPT

## 2021-10-15 PROCEDURE — 83036 HEMOGLOBIN GLYCOSYLATED A1C: CPT

## 2021-10-15 PROCEDURE — 96365 THER/PROPH/DIAG IV INF INIT: CPT

## 2021-10-15 PROCEDURE — 96366 THER/PROPH/DIAG IV INF ADDON: CPT

## 2021-10-15 PROCEDURE — 85610 PROTHROMBIN TIME: CPT

## 2021-10-15 PROCEDURE — 86901 BLOOD TYPING SEROLOGIC RH(D): CPT

## 2021-10-15 PROCEDURE — 6360000002 HC RX W HCPCS: Performed by: PHYSICIAN ASSISTANT

## 2021-10-15 PROCEDURE — 80053 COMPREHEN METABOLIC PANEL: CPT

## 2021-10-15 PROCEDURE — 99223 1ST HOSP IP/OBS HIGH 75: CPT | Performed by: SURGERY

## 2021-10-15 PROCEDURE — 85014 HEMATOCRIT: CPT

## 2021-10-15 RX ORDER — SODIUM CHLORIDE, SODIUM LACTATE, POTASSIUM CHLORIDE, AND CALCIUM CHLORIDE .6; .31; .03; .02 G/100ML; G/100ML; G/100ML; G/100ML
500 INJECTION, SOLUTION INTRAVENOUS ONCE
Status: COMPLETED | OUTPATIENT
Start: 2021-10-15 | End: 2021-10-15

## 2021-10-15 RX ORDER — SODIUM CHLORIDE, SODIUM LACTATE, POTASSIUM CHLORIDE, AND CALCIUM CHLORIDE .6; .31; .03; .02 G/100ML; G/100ML; G/100ML; G/100ML
250 INJECTION, SOLUTION INTRAVENOUS ONCE
Status: COMPLETED | OUTPATIENT
Start: 2021-10-15 | End: 2021-10-15

## 2021-10-15 RX ORDER — MIDODRINE HYDROCHLORIDE 5 MG/1
5 TABLET ORAL
Status: DISCONTINUED | OUTPATIENT
Start: 2021-10-15 | End: 2021-10-15

## 2021-10-15 RX ORDER — INSULIN LISPRO 100 [IU]/ML
0-6 INJECTION, SOLUTION INTRAVENOUS; SUBCUTANEOUS EVERY 6 HOURS
Status: DISCONTINUED | OUTPATIENT
Start: 2021-10-15 | End: 2021-10-19

## 2021-10-15 RX ORDER — ONDANSETRON 4 MG/1
4 TABLET, ORALLY DISINTEGRATING ORAL EVERY 8 HOURS PRN
Status: DISCONTINUED | OUTPATIENT
Start: 2021-10-15 | End: 2021-10-21 | Stop reason: HOSPADM

## 2021-10-15 RX ORDER — SODIUM CHLORIDE 9 MG/ML
25 INJECTION, SOLUTION INTRAVENOUS PRN
Status: DISCONTINUED | OUTPATIENT
Start: 2021-10-15 | End: 2021-10-21 | Stop reason: HOSPADM

## 2021-10-15 RX ORDER — MIDODRINE HYDROCHLORIDE 5 MG/1
10 TABLET ORAL 3 TIMES DAILY
Status: DISCONTINUED | OUTPATIENT
Start: 2021-10-15 | End: 2021-10-20

## 2021-10-15 RX ORDER — NICOTINE POLACRILEX 4 MG
15 LOZENGE BUCCAL PRN
Status: DISCONTINUED | OUTPATIENT
Start: 2021-10-15 | End: 2021-10-15 | Stop reason: SDUPTHER

## 2021-10-15 RX ORDER — DEXTROSE MONOHYDRATE 25 G/50ML
12.5 INJECTION, SOLUTION INTRAVENOUS PRN
Status: DISCONTINUED | OUTPATIENT
Start: 2021-10-15 | End: 2021-10-15 | Stop reason: SDUPTHER

## 2021-10-15 RX ORDER — SODIUM CHLORIDE, SODIUM LACTATE, POTASSIUM CHLORIDE, AND CALCIUM CHLORIDE .6; .31; .03; .02 G/100ML; G/100ML; G/100ML; G/100ML
500 INJECTION, SOLUTION INTRAVENOUS ONCE
Status: CANCELLED | OUTPATIENT
Start: 2021-10-15 | End: 2021-10-15

## 2021-10-15 RX ORDER — DEXTROSE MONOHYDRATE 50 MG/ML
100 INJECTION, SOLUTION INTRAVENOUS PRN
Status: DISCONTINUED | OUTPATIENT
Start: 2021-10-15 | End: 2021-10-21 | Stop reason: HOSPADM

## 2021-10-15 RX ORDER — SODIUM CHLORIDE 9 MG/ML
INJECTION, SOLUTION INTRAVENOUS PRN
Status: DISCONTINUED | OUTPATIENT
Start: 2021-10-15 | End: 2021-10-21 | Stop reason: HOSPADM

## 2021-10-15 RX ORDER — ONDANSETRON 2 MG/ML
4 INJECTION INTRAMUSCULAR; INTRAVENOUS EVERY 6 HOURS PRN
Status: DISCONTINUED | OUTPATIENT
Start: 2021-10-15 | End: 2021-10-21 | Stop reason: HOSPADM

## 2021-10-15 RX ORDER — POLYETHYLENE GLYCOL 3350 17 G/17G
17 POWDER, FOR SOLUTION ORAL DAILY PRN
Status: DISCONTINUED | OUTPATIENT
Start: 2021-10-15 | End: 2021-10-21 | Stop reason: HOSPADM

## 2021-10-15 RX ORDER — AMOXICILLIN AND CLAVULANATE POTASSIUM 875; 125 MG/1; MG/1
1 TABLET, FILM COATED ORAL 2 TIMES DAILY
Status: ON HOLD | COMMUNITY
Start: 2021-10-07 | End: 2021-10-21 | Stop reason: HOSPADM

## 2021-10-15 RX ORDER — 0.9 % SODIUM CHLORIDE 0.9 %
1000 INTRAVENOUS SOLUTION INTRAVENOUS ONCE
Status: COMPLETED | OUTPATIENT
Start: 2021-10-15 | End: 2021-10-15

## 2021-10-15 RX ORDER — ACETAMINOPHEN 650 MG/1
650 SUPPOSITORY RECTAL EVERY 6 HOURS PRN
Status: DISCONTINUED | OUTPATIENT
Start: 2021-10-15 | End: 2021-10-21 | Stop reason: HOSPADM

## 2021-10-15 RX ORDER — ACETAMINOPHEN 325 MG/1
650 TABLET ORAL EVERY 6 HOURS PRN
Status: DISCONTINUED | OUTPATIENT
Start: 2021-10-15 | End: 2021-10-21 | Stop reason: HOSPADM

## 2021-10-15 RX ORDER — DEXTROSE MONOHYDRATE 25 G/50ML
12.5 INJECTION, SOLUTION INTRAVENOUS PRN
Status: DISCONTINUED | OUTPATIENT
Start: 2021-10-15 | End: 2021-10-21 | Stop reason: HOSPADM

## 2021-10-15 RX ORDER — NICOTINE POLACRILEX 4 MG
15 LOZENGE BUCCAL PRN
Status: DISCONTINUED | OUTPATIENT
Start: 2021-10-15 | End: 2021-10-21 | Stop reason: HOSPADM

## 2021-10-15 RX ORDER — DEXTROSE MONOHYDRATE 50 MG/ML
100 INJECTION, SOLUTION INTRAVENOUS PRN
Status: DISCONTINUED | OUTPATIENT
Start: 2021-10-15 | End: 2021-10-15 | Stop reason: SDUPTHER

## 2021-10-15 RX ORDER — MIDODRINE HYDROCHLORIDE 5 MG/1
10 TABLET ORAL 3 TIMES DAILY
Status: DISCONTINUED | OUTPATIENT
Start: 2021-10-15 | End: 2021-10-15

## 2021-10-15 RX ORDER — SODIUM CHLORIDE 0.9 % (FLUSH) 0.9 %
5-40 SYRINGE (ML) INJECTION PRN
Status: DISCONTINUED | OUTPATIENT
Start: 2021-10-15 | End: 2021-10-21 | Stop reason: HOSPADM

## 2021-10-15 RX ORDER — SODIUM CHLORIDE 0.9 % (FLUSH) 0.9 %
5-40 SYRINGE (ML) INJECTION EVERY 12 HOURS SCHEDULED
Status: DISCONTINUED | OUTPATIENT
Start: 2021-10-15 | End: 2021-10-21 | Stop reason: HOSPADM

## 2021-10-15 RX ADMIN — SODIUM CHLORIDE, PRESERVATIVE FREE 10 ML: 5 INJECTION INTRAVENOUS at 21:18

## 2021-10-15 RX ADMIN — MEROPENEM 1000 MG: 1 INJECTION, POWDER, FOR SOLUTION INTRAVENOUS at 10:17

## 2021-10-15 RX ADMIN — SODIUM CHLORIDE, SODIUM LACTATE, POTASSIUM CHLORIDE, AND CALCIUM CHLORIDE 500 ML: .6; .31; .03; .02 INJECTION, SOLUTION INTRAVENOUS at 14:15

## 2021-10-15 RX ADMIN — VANCOMYCIN HYDROCHLORIDE 1250 MG: 10 INJECTION, POWDER, LYOPHILIZED, FOR SOLUTION INTRAVENOUS at 11:19

## 2021-10-15 RX ADMIN — MIDODRINE HYDROCHLORIDE 10 MG: 5 TABLET ORAL at 19:39

## 2021-10-15 RX ADMIN — SODIUM CHLORIDE, POTASSIUM CHLORIDE, SODIUM LACTATE AND CALCIUM CHLORIDE 250 ML: 600; 310; 30; 20 INJECTION, SOLUTION INTRAVENOUS at 10:19

## 2021-10-15 RX ADMIN — SODIUM CHLORIDE 1000 ML: 0.9 INJECTION, SOLUTION INTRAVENOUS at 16:18

## 2021-10-15 ASSESSMENT — PAIN SCALES - PAIN ASSESSMENT IN ADVANCED DEMENTIA (PAINAD)
BODYLANGUAGE: 0
BREATHING: 0
FACIALEXPRESSION: 0
TOTALSCORE: 0
CONSOLABILITY: 0
TOTALSCORE: 0
CONSOLABILITY: 0
CONSOLABILITY: 0
BREATHING: 0
NEGVOCALIZATION: 0
NEGVOCALIZATION: 0
BODYLANGUAGE: 0
FACIALEXPRESSION: 0
FACIALEXPRESSION: 0
BODYLANGUAGE: 0
TOTALSCORE: 0
NEGVOCALIZATION: 0
BREATHING: 0

## 2021-10-15 ASSESSMENT — PAIN SCALES - GENERAL
PAINLEVEL_OUTOF10: 0

## 2021-10-15 NOTE — H&P
Nephrology Consult Note                                                                                                                                                                                                                                                                                                                                                               Office : [phone]     Fax :[phone]              Patient's Name: Lj Vega  2:23 PM  10/15/2021    Reason for Consult:  ESRD on HD MWF        Quiana Aguirre a 80 y.o. female with PMH ESRD on HD MWF, anemia, HTN, dementia, CAD, stroke, gastric tube in place and with colostomy in place. Patient was recently admitted on 10/04/21 from her dialysis center with a clogged dialysis catheter and received catheter replacement. Today, patient presents with AMS. Patient was reported to be due for dialysis today. Upon presentation, patient was found to be febrile. Hemoglobin was at 5.3 and patient received blood transfusion. Patient has ESRD and troponin was elevated at 0.81 and pBNP at over 70,000, troponin is trending. EKG was conducted. Patient was additionally hyponatremic at 123, and hypochloremic at 82. Imaging was conducted with summary of the radiologist's impressions indicating the following: CT abdomen and pelvis revealed an indeterminate hypodense lesion in the right hepatic lobe with recommended liver MRI, b/l pleural effusions with right greater than left, and right lower lobe consolidation which was reported as presumably passive atelectasis, and postsurgical changes in the abdomen and pelvis, no convincing evidence of an acute GI tract abnormality. CXR revealed new airspace disease involving the majority of the lung, an CT head w/out contrast revealed no acute intracranial hemorrhage or mass effect, an old insult, and cerebral atrophy. Urine analysis was conducted and urine culture is pending.  In addition, patient became hypotensive in the ED. Lactic acid was found to be 4.3. Patient received LR IVF. Empiric antibiotics were administered and patient received vancomycin and meropenem.      Patient was minimally responsive during this encounter. ROS was limited due to patient's current mental status.         Past Medical History:   Diagnosis Date    CAD (coronary artery disease)     s/p CABG 2000. Cath 9/10 1/3 grafts occluded. Patent LIMA-LAD, patent SVG-OM, 60% distal LAD lesion distal to anastamosis, occluded SVG-LCX,, 70% prox native LCx, diffuse RCA dz. Declines redo CABG. will consider PCI of LAD and LCX    [facility]     Diabetes mellitus ([facility]     followed by PCP    Hyperlipidemia     rec semi annual lipids w/ LDL goal <70    Lung mass     likely benign    Syncope     secondary to medications       Past Surgical History:   Procedure Laterality Date    BREAST SURGERY      left breast tumor removed    CARDIAC SURGERY  2000    CABG    CHOLECYSTECTOMY      CORONARY ANGIOPLASTY WITH STENT PLACEMENT  2019    CORONARY ARTERY BYPASS GRAFT      DIAGNOSTIC CARDIAC CATH LAB PROCEDURE      KNEE SURGERY      UPPER GASTROINTESTINAL ENDOSCOPY N/A 10/14/2019    EGD DIAGNOSTIC ONLY performed by Deacon Ramirez MD at [address] [place] [address] History   Problem Relation Age of Onset    Stroke Mother     Heart Disease Father         reports that she has never smoked. She has never used smokeless tobacco. She reports that she does not drink alcohol and does not use drugs. Allergies:  Iodine    Current Medications:    0.9 % sodium chloride infusion, PRN  lactated ringers bolus, Once            Physical exam:     Vitals:  BP 99/62   Pulse 83   Temp 99.4 °F (37.4 °C) (Axillary)   Resp 22   Ht 5' 3\" (1.6 m)   Wt 114 lb (51.7 kg)   LMP  (LMP Unknown)   SpO2 100%   BMI 20.19 kg/m²     Physical Exam  Constitutional:       Appearance: She is ill-appearing.       Comments: Patient is lying in bed with her eyes closed. Patient responds to non-painful tactile stimuli. Cardiovascular:      Rate and Rhythm: Normal rate and regular rhythm. Pulmonary:      Effort: Pulmonary effort is normal.      Breath sounds: Normal breath sounds. Abdominal:      Palpations: Abdomen is soft. Comments: Colostomy bag and gastric tube noted   Musculoskeletal:         General: No swelling. Skin:     Comments: Dried blood noted around port site at right upper chest.    Neurological:      Comments: Patient is minimally alert, responding to nonpainful tactile stimuli         Data:   Labs:  CBC:   Recent Labs     10/15/21  0947   WBC 9.1   HGB 5.3*        BMP:    Recent Labs     10/15/21  0947   *   K 3.5   CL 82*   CO2 26   BUN 52*   CREATININE 2.7*   GLUCOSE 308*     Ca/Mg/Phos:   Recent Labs     10/15/21  0947   CALCIUM 8.9   MG 1.90     Hepatic:   Recent Labs     10/15/21  0947   AST 35   ALT 22   BILITOT <0.2   ALKPHOS 183*     Troponin:   Recent Labs     10/15/21  0947 10/15/21  1152   TROPONINI 0.81* 0.85*     BNP: No results for input(s): BNP in the last 72 hours. Lipids: No results for input(s): CHOL, TRIG, HDL, LDLCALC, LABVLDL in the last 72 hours. ABGs: No results for input(s): PHART, PO2ART, HKH2JEX in the last 72 hours. INR: No results for input(s): INR in the last 72 hours. UA:  Recent Labs     10/15/21  0947   COLORU Yellow   CLARITYU TURBID*   GLUCOSEU Negative   BILIRUBINUR Negative   KETUA Negative   SPECGRAV 1.020   BLOODU MODERATE*   PHUR 6.0   PROTEINU 100*   UROBILINOGEN 0.2   NITRU Negative   LEUKOCYTESUR LARGE*   LABMICR YES   URINETYPE NotGiven      Urine Microscopic:   Recent Labs     10/15/21  0947   BACTERIA 2+*   WBCUA 21-50*   RBCUA 3-4   EPIU 2-5     Urine Culture: No results for input(s): LABURIN in the last 72 hours. Urine Chemistry: No results for input(s): Christo Ng, PROTEINUR, NAUR in the last 72 hours.           IMAGING:  XR CHEST PORTABLE   Final Result      New airspace disease in the right lung. Correlate for an acute infectious process. CT ABDOMEN PELVIS WO CONTRAST Additional Contrast? None   Final Result      Postsurgical changes are present in the abdomen and pelvis as detailed above. No convincing evidence of an acute GI tract abnormality. Indeterminate hypodense lesion in the right hepatic lobe. Recommend further evaluation with liver MRI with and without contrast.      Bilateral pleural effusions, right greater than left. Right lower lobe consolidation, presumably passive atelectasis. CT Head WO Contrast   Final Result      No acute intracranial hemorrhage or mass effect. Old insult in the brain as detailed above. Cerebral atrophy. Assessment/Plan   1. Hypotension    2. Anemia    3. Acid- base/ Electrolyte imbalance     4.  AMS  __________________________________  -patient received LR IVF, hypotensive  -continue LR IVF  -monitor BP   -patient reported to be due for HD today  -hold off dialysis for today due to presentation          Thank you for allowing us to participate in care of [id number] [facility] Student  MS-4      Agree with plan above     Andriy Vang MD

## 2021-10-15 NOTE — H&P
removed   Miguel Kaba    CABG    CHOLECYSTECTOMY      CORONARY ANGIOPLASTY WITH STENT PLACEMENT  2019    CORONARY ARTERY BYPASS GRAFT      DIAGNOSTIC CARDIAC CATH LAB PROCEDURE      KNEE SURGERY      UPPER GASTROINTESTINAL ENDOSCOPY N/A 10/14/2019    EGD DIAGNOSTIC ONLY performed by Ky Abdullahi MD at [facility]       Medications Prior to Admission:      Prior to Admission medications    Medication Sig Start Date End Date Taking? Authorizing Provider   amoxicillin-clavulanate (AUGMENTIN) 875-125 MG per tablet Take 1 tablet by mouth 2 times daily To end 10/21/21 10/7/21 10/21/21 Yes Historical Provider, MD   acetaminophen (TYLENOL) 325 MG tablet 650 mg by Per G Tube route every 4 hours as needed for Pain or Fever   Yes Historical Provider, MD   insulin lispro (HUMALOG) 100 UNIT/ML injection vial Inject 0-10 Units into the skin every 6 hours Per Sliding scale   Yes Historical Provider, MD   aspirin 81 MG chewable tablet 81 mg by Per G Tube route daily   Yes Historical Provider, MD   vitamin B complex (NATURES BLEND B COMPLEX) TABS tablet 1 tablet by Per G Tube route daily   Yes Historical Provider, MD   famotidine (PEPCID) 10 MG tablet 10 mg by Per G Tube route daily For GERD for 30 days. 9/24/21 10/24/21 Yes Historical Provider, MD   atorvastatin (LIPITOR) 80 MG tablet 80 mg by Per G Tube route nightly   Yes Historical Provider, MD   midodrine (PROAMATINE) 10 MG tablet 10 mg 3 times daily Via G tube   Yes Historical Provider, MD   OLANZapine (ZYPREXA) 10 MG tablet 10 mg by Per G Tube route nightly   Yes Historical Provider, MD   insulin glargine (SEMGLEE) 100 UNIT/ML injection vial Inject 27 Units into the skin 2 times daily   Yes Historical Provider, MD   valproic acid (DEPAKENE) 250 MG/5ML SOLN oral solution 130 mg by Per G Tube route 3 times daily   Yes Historical Provider, MD   gabapentin (NEURONTIN) 100 MG capsule Take 100 mg by mouth 3 times daily.    Yes Historical Provider, MD Allergies:  Iodine    Social History:      The patient currently lives in SNF. TOBACCO:   reports that she has never smoked. She has never used smokeless tobacco.  ETOH:   reports no history of alcohol use. Family History:      Reviewed in detail and positive as follows:        Problem Relation Age of Onset    Stroke Mother     Heart Disease Father        REVIEW OF SYSTEMS:   Pertinent positives as noted in the HPI. All other systems reviewed and negative. PHYSICAL EXAM:    BP (!) 80/57   Pulse 83   Temp 99.4 °F (37.4 °C) (Axillary)   Resp 21   Ht 5' 3\" (1.6 m)   Wt 114 lb (51.7 kg)   LMP  (LMP Unknown)   SpO2 100%   BMI 20.19 kg/m²     General appearance: Lethargic, appears stated age. HEENT: Normal cephalic, atraumatic without obvious deformity. Pupils equal, round, and reactive to light. Extra ocular muscles intact. Conjunctivae/corneas clear. Neck: Supple, with full range of motion. No jugular venous distention. Trachea midline. Respiratory:  Normal respiratory effort. +b/l wheezes  Cardiovascular: Regular rate and rhythm with normal S1/S2 without murmurs, rubs or gallops. Abdomen: Soft abdomen, mild distention, not apparently eliciting pain although patient is lethargic  Musculoskelatal: No clubbing, cyanosis or edema bilaterally. Full range of motion without deformity. Skin: Skin color, texture, turgor normal.  No rashes or lesions. Neurologic:  Not following commands, opens eyes when spoken to. Is moving both UEs. Psychiatric: Unable to assess    Labs:     Recent Labs     10/15/21  0947 10/15/21  1455   WBC 9.1  --    HGB 5.3* 6.9*   HCT 15.6* 20.3*     --      Recent Labs     10/15/21  0947   *   K 3.5   CL 82*   CO2 26   BUN 52*   CREATININE 2.7*   CALCIUM 8.9     Recent Labs     10/15/21  0947   AST 35   ALT 22   BILITOT <0.2   ALKPHOS 183*     No results for input(s): INR in the last 72 hours.   Recent Labs     10/15/21  0947 10/15/21  1152   TROPONINI 0.81* urine cultures ordered  On vancomycin and meropenem  General surgery consulted    Hold TFs for now    Hypotension  Treatment for sepsis with antibiotics and IV fluids approx 2 L  Patient has also been on midodrine, will restart    Hyponatremia  ESRD on HD  Nephrology consulted for HD  Receiving IV fluids    Acute on chronic anemia  Hgb 5.3 in ED  Per chart review there is a note with concern for black stools though she is on iron therapy  Iron studies  Protonix IV BID for now  FOBT  Monitor H&H q6 hours  GI consultation    Acute hypoxemic respiratory failure  B/l pleural effusions on imaging, atelectasis vs PNA  Antibiotics as above    Troponin elevation likely due to demand ischemia in the setting of sepsis and ESRD  CAD s/p CABG 2000  Patient has ST changes that appear similar but more pronounced since her last EKG  Hold anticoagulation due to acute anemia with Hgb of 5  S/p 1 unit  Transfuse another unit pRBC with goal Hgb >8  Cardiology consulted  Echo  Telemetry    DMII  Low dose sliding scale on NPO algorithm    Dysphagia with PEG tube at baseline    Pharmacy med/rec    DVT Prophylaxis: SCDs only for now  Diet: No diet orders on file  Code Status: Prior    PT/OT Eval Status: pending    Dispo - Inpatient. Patient initially to be admitted to the ICU however per ICU ok for PCU status if blood pressures stabilize after fluid resuscitation. Erma Street DO     Thank you AYDEN Weinberg for the opportunity to be involved in this patient's care. If you have any questions or concerns please feel free to contact [facility] at [facility].

## 2021-10-15 NOTE — ED PROVIDER NOTES
[address] [facility] [address] ENCOUNTER          PHYSICIAN ASSISTANT NOTE       Date of evaluation: 10/15/2021    Chief Complaint     Altered Mental Status ([address] WITH AMS)      History of Present Illness     Cindi Simmons is a 80 y.o. female who presents with altered mental status. Patient arrives via EMS from nursing home for altered mental status. Nurse here attempted to contact the nurse at the nursing home several times and was unsuccessful. Patient is unable to contribute to history. On chart review, the patient is end-stage renal disease requiring dialysis, EMS reports she is due today. On chart review, the patient has a history of sepsis from ischemic colitis. History limited due to mental status. Review of Systems     Review of Systems   Unable to perform ROS: Mental status change       Past Medical, Surgical, Family, and Social History     She has a past medical history of CAD (coronary artery disease), Carotid artery disease ([facility] [address] Diabetes mellitus ([facility] [address] Hyperlipidemia, Lung mass, and Syncope. She has a past surgical history that includes Cardiac surgery (2000); Cholecystectomy; Breast surgery; Coronary artery bypass graft; knee surgery; Upper gastrointestinal endoscopy (N/A, 10/14/2019); Diagnostic Cardiac Cath Lab Procedure; and Coronary angioplasty with stent (2019). Her family history includes Heart Disease in her father; Stroke in her mother. She reports that she has never smoked. She has never used smokeless tobacco. She reports that she does not drink alcohol and does not use drugs.     Medications     Previous Medications    ACETAMINOPHEN (TYLENOL) 325 MG TABLET    650 mg by Per G Tube route every 4 hours as needed for Pain or Fever    AMOXICILLIN-CLAVULANATE (AUGMENTIN) 875-125 MG PER TABLET    Take 1 tablet by mouth 2 times daily To end 10/21/21    ASPIRIN 81 MG CHEWABLE TABLET    81 mg by Per G Tube route daily    ATORVASTATIN (LIPITOR) 80 MG TABLET 80 mg by Per G Tube route nightly    FAMOTIDINE (PEPCID) 10 MG TABLET    10 mg by Per G Tube route daily For GERD for 30 days. GABAPENTIN (NEURONTIN) 100 MG CAPSULE    Take 100 mg by mouth 3 times daily. INSULIN GLARGINE (SEMGLEE) 100 UNIT/ML INJECTION VIAL    Inject 27 Units into the skin 2 times daily    INSULIN LISPRO (HUMALOG) 100 UNIT/ML INJECTION VIAL    Inject 0-10 Units into the skin every 6 hours Per Sliding scale    MIDODRINE (PROAMATINE) 10 MG TABLET    10 mg 3 times daily Via G tube    OLANZAPINE (ZYPREXA) 10 MG TABLET    10 mg by Per G Tube route nightly    VALPROIC ACID (DEPAKENE) 250 MG/5ML SOLN ORAL SOLUTION    130 mg by Per G Tube route 3 times daily    VITAMIN B COMPLEX (NATURES BLEND B COMPLEX) TABS TABLET    1 tablet by Per G Tube route daily       Allergies     She is allergic to iodine. Physical Exam     INITIAL VITALS: BP: (!) 105/56, Temp: 102.4 °F (39.1 °C), Pulse: 95, Resp: 20, SpO2: 94 %  Physical Exam  Vitals and nursing note reviewed. Exam conducted with a chaperone present. Constitutional:       Comments: Appears chronically unwell, alert, makes eye contact in response to name   HENT:      Head: Normocephalic and atraumatic. Eyes:      Extraocular Movements: Extraocular movements intact. Pupils: Pupils are equal, round, and reactive to light. Cardiovascular:      Rate and Rhythm: Normal rate and regular rhythm. Comments: Port to right chest with dried blood at base, no erythema/warmth/drainage  Pulmonary:      Effort: Pulmonary effort is normal.      Breath sounds: Normal breath sounds. Abdominal:      General: There is no distension. Palpations: Abdomen is soft. Tenderness: There is no abdominal tenderness. Comments: Ostomy and feeding tube in place, no surrounding erythema/warmth/drainage   Genitourinary:     Comments: Purulent drainage from rectum, no surrounding erythema.    Musculoskeletal:      Cervical back: Normal range of motion and neck supple. Skin:     General: Skin is warm and dry. Neurological:      Mental Status: She is alert. Comments: Makes eye contact in response to name, does not follow commands, moves all extremities spontaneously         Diagnostic Results     EKG   Interpreted in conjunction with emergency department physician Tonya Velez MD  Rhythm: normal sinus   Rate: normal  Axis: normal  Ectopy: premature ventricular contractions (infrequent)  Conduction: normal  ST Segments: nonspecific changes V4-V6  T Waves: non specific changes  Q Waves:nonspecific  Clinical Impression: non-specific EKG  Comparison:  10/09/20    RADIOLOGY:  XR CHEST PORTABLE   Final Result      New airspace disease in the right lung. Correlate for an acute infectious process. CT ABDOMEN PELVIS WO CONTRAST Additional Contrast? None   Final Result      Postsurgical changes are present in the abdomen and pelvis as detailed above. No convincing evidence of an acute GI tract abnormality. Indeterminate hypodense lesion in the right hepatic lobe. Recommend further evaluation with liver MRI with and without contrast.      Bilateral pleural effusions, right greater than left. Right lower lobe consolidation, presumably passive atelectasis. CT Head WO Contrast   Final Result      No acute intracranial hemorrhage or mass effect. Old insult in the brain as detailed above. Cerebral atrophy.              LABS:   Results for orders placed or performed during the hospital encounter of 10/15/21   Troponin   Result Value Ref Range    Troponin 0.81 (HH) <0.01 ng/mL   CBC Auto Differential   Result Value Ref Range    WBC 9.1 4.0 - 11.0 K/uL    RBC 1.52 (L) 4.00 - 5.20 M/uL    Hemoglobin 5.3 (LL) 12.0 - 16.0 g/dL    Hematocrit 15.6 (LL) 36.0 - 48.0 %    .3 (H) 80.0 - 100.0 fL    MCH 34.9 (H) 26.0 - 34.0 pg    MCHC 34.1 31.0 - 36.0 g/dL    RDW 22.4 (H) 12.4 - 15.4 %    Platelets 243 762 - 773 K/uL    MPV 10.1 5.0 - 10.5 fL    Neutrophils % 73.0 %    Lymphocytes % 10.0 %    Monocytes % 9.0 %    Eosinophils % 0.0 %    Basophils % 0.0 %    Neutrophils Absolute 7.4 1.7 - 7.7 K/uL    Lymphocytes Absolute 0.9 (L) 1.0 - 5.1 K/uL    Monocytes Absolute 0.8 0.0 - 1.3 K/uL    Eosinophils Absolute 0.0 0.0 - 0.6 K/uL    Basophils Absolute 0.0 0.0 - 0.2 K/uL    Bands Relative 4 0 - 7 %    Metamyelocytes Relative 3 (A) %    Myelocyte Percent 1 (A) %    Anisocytosis 1+ (A)     Polychromasia 2+ (A)     Hypochromia 1+ (A)    Comprehensive Metabolic Panel w/ Reflex to MG   Result Value Ref Range    Sodium 123 (L) 136 - 145 mmol/L    Potassium reflex Magnesium 3.5 3.5 - 5.1 mmol/L    Chloride 82 (L) 99 - 110 mmol/L    CO2 26 21 - 32 mmol/L    Anion Gap 15 3 - 16    Glucose 308 (H) 70 - 99 mg/dL    BUN 52 (H) 7 - 20 mg/dL    CREATININE 2.7 (H) 0.6 - 1.2 mg/dL    GFR Non-African American 17 (A) >60    GFR  21 (A) >60    Calcium 8.9 8.3 - 10.6 mg/dL    Total Protein 5.8 (L) 6.4 - 8.2 g/dL    Albumin 3.0 (L) 3.4 - 5.0 g/dL    Albumin/Globulin Ratio 1.1 1.1 - 2.2    Total Bilirubin <0.2 0.0 - 1.0 mg/dL    Alkaline Phosphatase 183 (H) 40 - 129 U/L    ALT 22 10 - 40 U/L    AST 35 15 - 37 U/L    Globulin 2.8 Not Established g/dL   Brain Natriuretic Peptide   Result Value Ref Range    Pro-BNP >70,000 (H) 0 - 124 pg/mL   Blood Gas, Venous   Result Value Ref Range    pH, Jd 7.471 (H) 7.350 - 7.450    pCO2, Jd 41.3 41.0 - 51.0 mmHg    pO2, Jd 33.7 25 - 40 mmHg    HCO3, Venous 30.1 (H) 24.0 - 28.0 mmol/L    Base Excess, Jd 5.9 (H) -2.0 - 3.0 mmol/L    O2 Sat, Jd 62 Not established %    Carboxyhemoglobin 1.9 (H) 0.0 - 1.5 %    MetHgb, Jd 0.6 0.0 - 1.5 %    TC02 (Calc), Jd 31 mmol/L    Hemoglobin, Jd, Reduced 37.30 %   Lactic Acid, Plasma   Result Value Ref Range    Lactic Acid 4.3 (HH) 0.4 - 2.0 mmol/L   Urinalysis, reflex to microscopic   Result Value Ref Range    Color, UA Yellow Straw/Yellow    Clarity, UA TURBID (A) Clear    Glucose, Ur Negative Negative mg/dL    Bilirubin Urine Negative Negative    Ketones, Urine Negative Negative mg/dL    Specific Gravity, UA 1.020 1.005 - 1.030    Blood, Urine MODERATE (A) Negative    pH, UA 6.0 5.0 - 8.0    Protein,  (A) Negative mg/dL    Urobilinogen, Urine 0.2 <2.0 E.U./dL    Nitrite, Urine Negative Negative    Leukocyte Esterase, Urine LARGE (A) Negative    Microscopic Examination YES     Urine Type NotGiven    Microscopic Urinalysis   Result Value Ref Range    WBC, UA 21-50 (A) 0 - 5 /HPF    RBC, UA 3-4 0 - 4 /HPF    Epithelial Cells, UA 2-5 0 - 5 /HPF    Bacteria, UA 2+ (A) None Seen /HPF    Amorphous, UA 4+ /HPF    Yeast, UA Present (A) None Seen /HPF   Magnesium   Result Value Ref Range    Magnesium 1.90 1.80 - 2.40 mg/dL   POCT Glucose   Result Value Ref Range    POC Glucose 367 (H) 70 - 99 mg/dl    Performed on ACCU-CHEK    EKG 12 Lead   Result Value Ref Range    Ventricular Rate 96 BPM    Atrial Rate 96 BPM    P-R Interval 122 ms    QRS Duration 80 ms    Q-T Interval 312 ms    QTc Calculation (Bazett) 394 ms    P Axis -80 degrees    R Axis 49 degrees    T Axis 228 degrees    Diagnosis       EKG performed in ER and to be interpreted by ER physician. Confirmed by MD, ER ([id number]),  Toya Ahn ([id number]) on 10/15/2021 10:33:18 AM   TYPE AND SCREEN   Result Value Ref Range    ABO/Rh O NEG     Antibody Screen NEG    PREPARE RBC (CROSSMATCH), 1 Units   Result Value Ref Range    Product Code Blood Bank [id number]     Description Blood Bank Red Blood Cells, Apheresis, Leuko-reduced     Unit Number [id number]     Dispense Status Blood Bank issued        ED BEDSIDE ULTRASOUND:      RECENT VITALS:  BP: (!) 90/54, Temp: 99.4 °F (37.4 °C), Pulse: 85, Resp: 22, SpO2: 99 %     Procedures         ED Course     Nursing Notes, Past Medical Hx,Past Surgical Hx, Social Hx, Allergies, and Family Hx were reviewed.     The patient was given the following medications:  Orders Placed This Encounter   Medications    meropenem (MERREM) 1,000 mg in sodium chloride 0.9 % 100 mL IVPB (mini-bag)     Order Specific Question:   Antimicrobial Indications     Answer:   Bloodstream Infection     Order Specific Question:   Antimicrobial Indications     Answer:   Intra-Abdominal Infection    vancomycin (VANCOCIN) 1,250 mg in dextrose 5 % 250 mL IVPB     Order Specific Question:   Antimicrobial Indications     Answer:   Intra-Abdominal Infection     Order Specific Question:   Antimicrobial Indications     Answer:   Bloodstream Infection    lactated ringers bolus    0.9 % sodium chloride infusion       CONSULTS:  PHARMACY TO DOSE VANCOMYCIN  IP CONSULT TO SOCIAL WORK  IP CONSULT TO CRITICAL CARE  IP CONSULT TO NEPHROLOGY  IP CONSULT TO HOSPITALIST    MEDICAL DECISION MAKING / ASSESSMENT / Bertram Rodriguez is a 80 y.o. female with altered mental status. Patient is alert but not following commands. On arrival patient is found to be febrile, vitals otherwise normal.  After a period of time her oxygen saturation did fall and she was placed on 3 liters by nasal cannula. Blood and urine cultures ordered. Patient given 250 cc of fluid - patient was not given 30 cc/kg of fluid due to dialysis status and concern for fluid overload as she is due for dialysis today. Meropenem and vancomycin initiated based on prior admission for sepsis from acute ischemic colitis and concern of purulent drainage from rectum and cloudy urine. EKG is normal sinus rhythm with some ST changes laterally, no STEMI. Chest x-ray shows new airspace disease in the right lung. CT of the abdomen reveals the right lower lobe findings are likely atelectasis does not show any acute process in the abdomen. The CT had to be obtained without contrast due to allergy. Patient was found to be anemic to 5. Transfusion initiated. Patient has elevated BUN and creatinine but similar to prior with normal potassium. Nephrology consulted for dialysis.   Patient does have an elevated BUN and troponin which is likely thought to be secondary to end-stage renal disease and sepsis, will trend. Urine does show signs of infection. Patient did become more hypotensive in the ER and her lactic acid is found to be 4.3. Patient was given further fluids. Patient will be admitted to the ICU. I did speak with the son who reports patient does not want compressions or intubation but would want antibiotics, fluids, etc. which would make patient DNR-CCA. I spoke with the NH who could not find her code status paperwork. A consult was placed for social work to assist with code documentation, ICU and Hospitalist aware. This patient was also evaluated by the attending physician. All care plans were discussed and agreed upon. Clinical Impression     1. Altered mental status, unspecified altered mental status type    2. Anemia, unspecified type    3. Acute cystitis without hematuria    4. ESRD needing dialysis ([facility] [address]        Disposition     PATIENT REFERRED TO:  No follow-up provider specified.     DISCHARGE MEDICATIONS:  New Prescriptions    No medications on file       DISPOSITION  admit        Laura Peraza PA-C  10/15/21 [id number]

## 2021-10-15 NOTE — PROGRESS NOTES
4 Eyes Admission Assessment     I agree as the admission nurse that 2 RN's have performed a thorough Head to Toe Skin Assessment on the patient. ALL assessment sites listed below have been assessed on admission. Areas assessed by both nurses: Aagta Carpenter AM nurse/PM nurse Renee Salinas aware to assess  [x]   Head, Face, and Ears   [x]   Shoulders, Back, and Chest  [x]   Arms, Elbows,pink and Hands scattered abrasions , also on knees ; bruise lower abd mod size  [x]   Coccyx, stage 2 dime size , pink no drainage mepitel to site q2hr turns , ordered speciality mattress Sacrum, and Ischium  [x]   Legs, Feet, and Heels red mushy, blanchable, metitel applied, prevaloon boots on        Does the Patient have Skin Breakdown?   Yes a wound was noted on the Admission Assessment and an LDA was Initiated documentation include the Tyesha-wound, Wound Assessment, Measurements, Dressing Treatment, Drainage, and Color\",         Solitario Prevention initiated:  Yes   Wound Care Orders initiated:  Yes      WOC nurse consulted for Pressure Injury (Stage 3,4, Unstageable, DTI, NWPT, and Complex wounds) or Solitario score 18 or lower:  No      Nurse 1 eSignature: Electronically signed by Tejinder Farr RN on 10/15/21 at 6:37 PM EDT    **SHARE this note so that the co-signing nurse is able to place an eSignature**    Nurse 2 eSignature: Electronically signed by Ludwin Lea RN on 10/16/21 at 3:18 AM EDT

## 2021-10-15 NOTE — ED NOTES
Report called to floor Rn for [phone].  Pt can go to the floor when it is clean     Erendira Hernandez RN  10/15/21 [id number]

## 2021-10-15 NOTE — CONSULTS
General Surgery   Resident Consult Note    Reason for consult: hx of ischemic bowel s/p extended left colectomy, LAR and end colostomy       History of Present Illness :    Kathye Fothergill is a 80 y.o. female with ESRD on HD, CAD s/p CABG, DMII, HLD, CVA w/ right-sided deficits, HTN, admission for septic shock in 7/2021 secondary to ischemic bowel s/p extended left colectomy, LAR and end colostomy at [facility] which was then complicated by nstemi type 2 due to demand ischemia, dysphagia, and anemia. She was noted to be alert to self with some underlying confusion when she was discharged in July and was not able to communicate any history on last admission to [facility] due to reported baseline mental status. The patient cannot give any history. The patient presented with hypotension and was found to be anemic to 5.3. General surgery was consulted given recent surgical history. Past Medical History:        Diagnosis Date    CAD (coronary artery disease)     s/p CABG 2000. Cath 9/10 1/3 grafts occluded. Patent LIMA-LAD, patent SVG-OM, 60% distal LAD lesion distal to anastamosis, occluded SVG-LCX,, 70% prox native LCx, diffuse RCA dz. Declines redo CABG.   will consider PCI of LAD and LCX    [facility]     Diabetes mellitus ([facility]     followed by PCP    Hyperlipidemia     rec semi annual lipids w/ LDL goal <70    Lung mass     likely benign    Syncope     secondary to medications       Past Surgical History:           Procedure Laterality Date    BREAST SURGERY      left breast tumor removed    CARDIAC SURGERY  2000    CABG    CHOLECYSTECTOMY      CORONARY ANGIOPLASTY WITH STENT PLACEMENT  2019    CORONARY ARTERY BYPASS GRAFT      DIAGNOSTIC CARDIAC CATH LAB PROCEDURE      KNEE SURGERY      UPPER GASTROINTESTINAL ENDOSCOPY N/A 10/14/2019    EGD DIAGNOSTIC ONLY performed by Arturo Justice MD at [facility]:  Iodine    Medications:   Home Meds  No current facility-administered medications on file prior to encounter. Current Outpatient Medications on File Prior to Encounter   Medication Sig Dispense Refill    amoxicillin-clavulanate (AUGMENTIN) 875-125 MG per tablet Take 1 tablet by mouth 2 times daily To end 10/21/21      acetaminophen (TYLENOL) 325 MG tablet 650 mg by Per G Tube route every 4 hours as needed for Pain or Fever      insulin lispro (HUMALOG) 100 UNIT/ML injection vial Inject 0-10 Units into the skin every 6 hours Per Sliding scale      aspirin 81 MG chewable tablet 81 mg by Per G Tube route daily      vitamin B complex (NATURES BLEND B COMPLEX) TABS tablet 1 tablet by Per G Tube route daily      famotidine (PEPCID) 10 MG tablet 10 mg by Per G Tube route daily For GERD for 30 days.  atorvastatin (LIPITOR) 80 MG tablet 80 mg by Per G Tube route nightly      midodrine (PROAMATINE) 10 MG tablet 10 mg 3 times daily Via G tube      OLANZapine (ZYPREXA) 10 MG tablet 10 mg by Per G Tube route nightly      insulin glargine (SEMGLEE) 100 UNIT/ML injection vial Inject 27 Units into the skin 2 times daily      valproic acid (DEPAKENE) 250 MG/5ML SOLN oral solution 130 mg by Per G Tube route 3 times daily      gabapentin (NEURONTIN) 100 MG capsule Take 100 mg by mouth 3 times daily.        Current Meds  0.9 % sodium chloride infusion, PRN  sodium chloride flush 0.9 % injection 5-40 mL, 2 times per day  sodium chloride flush 0.9 % injection 5-40 mL, PRN  0.9 % sodium chloride infusion, PRN  ondansetron (ZOFRAN-ODT) disintegrating tablet 4 mg, Q8H PRN   Or  ondansetron (ZOFRAN) injection 4 mg, Q6H PRN  polyethylene glycol (GLYCOLAX) packet 17 g, Daily PRN  acetaminophen (TYLENOL) tablet 650 mg, Q6H PRN   Or  acetaminophen (TYLENOL) suppository 650 mg, Q6H PRN  perflutren lipid microspheres (DEFINITY) injection 1.65 mg, ONCE PRN  glucose (GLUTOSE) 40 % oral gel 15 g, PRN  glucagon (rDNA) injection 1 mg, PRN  dextrose 5 % solution, PRN  dextrose 50 % IV solution, PRN  insulin lispro (1 Unit Dial) 0-6 Units, Q6H  0.9 % sodium chloride infusion, PRN  [START ON 10/16/2021] meropenem (MERREM) 500 mg IVPB (mini-bag), Q24H  midodrine (PROAMATINE) tablet 10 mg, TID        Family History:   Family History   Problem Relation Age of Onset    Stroke Mother     Heart Disease Father        Social History:   TOBACCO:   reports that she has never smoked. She has never used smokeless tobacco.  ETOH:   reports no history of alcohol use. DRUGS:   reports no history of drug use. Review of Systems:   A 14 point review of systems was not conducted. Physical exam:    Vitals:    10/15/21 1545 10/15/21 1610 10/15/21 1700 10/15/21 1805   BP: (!) 80/57 (!) 98/50 91/72    Pulse: 83 82 83    Resp: 21 20 20    Temp:    96.6 °F (35.9 °C)   TempSrc:    Axillary   SpO2: 100% 100% 100% 100%   Weight:       Height:           General appearance: resting in bed, non-verbal  HENT: trachea midline, no JVD, no LAD  Eyes: PERRLA, no scleral icterus  Chest/Lungs: CTAB, no crackles/rales, wheezes/rhonchi   Cardiovascular: RRR, no murmurs/gallops/rubs  Abdomen: soft, non-tender, non-distended, ostomy pink and viable with loose brown stool. HAYLEY completed with loose brown discharge, PEG tube in place  Skin: warm and dry  Extremities: no edema , no cyanosis    Labs:    CBC:   Recent Labs     10/15/21  0947 10/15/21  1455   WBC 9.1  --    HGB 5.3* 6.9*   HCT 15.6* 20.3*   .3*  --      --      BMP:   Recent Labs     10/15/21  0947   *   K 3.5   CL 82*   CO2 26   BUN 52*   CREATININE 2.7*     PT/INR: No results for input(s): PROTIME, INR in the last 72 hours. APTT: No results for input(s): APTT in the last 72 hours.   Liver Profile:  Lab Results   Component Value Date    AST 35 10/15/2021    ALT 22 10/15/2021    BILIDIR <0.2 10/10/2019    BILITOT <0.2 10/15/2021    ALKPHOS 183 10/15/2021     Lab Results   Component Value Date    CHOL 201 10/15/2019    HDL 31 10/15/2019    TRIG 125 10/15/2019     UA:   Lab Results   Component Value Date    COLORU Yellow 10/15/2021    PHUR 6.0 10/15/2021    WBCUA 21-50 10/15/2021    RBCUA 3-4 10/15/2021    YEAST Present 10/15/2021    BACTERIA 2+ 10/15/2021    CLARITYU TURBID 10/15/2021    SPECGRAV 1.020 10/15/2021    LEUKOCYTESUR LARGE 10/15/2021    UROBILINOGEN 0.2 10/15/2021    BILIRUBINUR Negative 10/15/2021    BLOODU MODERATE 10/15/2021    GLUCOSEU Negative 10/15/2021    AMORPHOUS 4+ 10/15/2021       Imaging:   XR CHEST PORTABLE   Final Result      New airspace disease in the right lung. Correlate for an acute infectious process. CT ABDOMEN PELVIS WO CONTRAST Additional Contrast? None   Final Result      Postsurgical changes are present in the abdomen and pelvis as detailed above. No convincing evidence of an acute GI tract abnormality. Indeterminate hypodense lesion in the right hepatic lobe. Recommend further evaluation with liver MRI with and without contrast.      Bilateral pleural effusions, right greater than left. Right lower lobe consolidation, presumably passive atelectasis. CT Head WO Contrast   Final Result      No acute intracranial hemorrhage or mass effect. Old insult in the brain as detailed above. Cerebral atrophy. Assessment/Plan: This is a 80 y.o. female with an extensive medical history who presented with hypotension and found to be anemic. Patient with fever of 102.4 on admission. WBC within nomrla limits. Lactic acid initially 4.3 however trended down to 1.8 after IVF. Patient transfused two units with appropriate response. CT scan reviewed with radiology. There is some mild stranidng around hartmans stump consisted with post-surgical changes, no other intraabdominal abnormalities. +UTI.      - no surgical intervention indicated  - medical management per primary team    Discussed with Dr. Lissa Mclean MD PGY-5  10/15/21  7:24 PM  [phone]    I have seen, examined, and reviewed the patients chart. I agree with the residents assessment and have made appropriate changes.     Ibrahima Sifuentes

## 2021-10-15 NOTE — PROGRESS NOTES
Dr. Dequan Rodriguez will order moidrine per PEG, also putting in order for another unit; she states if this doesn't help her b/p she needs to go to the unit and she states she has talked to residents about this numerous times. Sherryle Moder Sherryle Moder Sherryle Moder Plan    another unit

## 2021-10-15 NOTE — ED PROVIDER NOTES
ED Attending Attestation Note     Date of evaluation: 10/15/2021    This patient was seen by the advance practice provider. I have seen and examined the patient, agree with the workup, evaluation, management and diagnosis. The care plan has been discussed. I have reviewed the ECG and concur with the WANDA's interpretation. My assessment reveals presents with altered mental status. Complex medical history with end-stage renal disease. On exam she does respond to tactile stimulation. Also noted to be febrile.   Will start broad-spectrum antibiotics CT head abdomen pelvis and reassess      Jean Paul Cummings MD  10/15/21 1906

## 2021-10-15 NOTE — CONSULTS
Clinical Pharmacy Progress Note    Admit date: 10/15/2021   Patient presents to the ED with complaints of altered mental status with possible intraabdominal infection or blood stream infection. Patient is a MWF HD patient from [facility]. Pharmacy is consulted to dose Vancomycin x1 dose in ED per KELLY Morrissey. Ht = 63 in  Wt = 51.7 kg      Assessment/Plan:  · Will order Vancomycin 1250mg x1 (24.2mg/kg) for administration in ED per ER pharmacy consult. · If Vancomycin is to continue on admission and pharmacy is to manage dosing, please re-consult with admission orders.       Thanks--  Elsi Castañeda, PharmD  PGY-1 Pharmacy Resident  [id number]  10/15/2021 9:34 AM

## 2021-10-15 NOTE — PROGRESS NOTES
Arrived to floor from ED, awake alert, talking gibberish , looking around room moving arms and legs , grabbing IV, pulling legs away , turned repositioned am care given, incont small amt of liquid white yellow stool has colst., felipa care given, stage 2 on tail bone mepitel placed turned speciality mattress ordered, heels red mushy, blanchable mepitel to them and prevaloon boots and bilAT. A-BOOTS PLACED , see assessment sheet , difficult to complete hx due to pt's mentation, per ED report pt ANO at SNF, dialysis vas. Cath with old dry bloody drainage , recent line change out per report, MWF dialysis on hold today, cont to monitor 2 liters sats 100% color pink , neg.  Edema, lungs diminished, note pro BNP, pt arrived completing fluid bolus , low B/P's see flow sheet messaged Dr. Yasmeen Yuan, wants midrine given , not safe for po, is NPO, has GT, will message to order to give this way

## 2021-10-15 NOTE — CARE COORDINATION
Pt currently in ED and will be admitted/transfer to medical floor. Chart reviewed. Pt is from [facility] long term care. Pt receives HD at [facility]. SW/CM will follow Pt and assist with DC plan and needs.      Shanna Wang, [place]  Case Management  [phone]

## 2021-10-15 NOTE — PROGRESS NOTES
Clinical Pharmacy Progress Note  Medication History     Admit Date: 10/15/2021    List of of current medications patient is taking is complete. Home Medication list in EPIC updated to reflect changes noted below. Source of information: [facility] paperwork    Patient's home pharmacy: [facility]      Changes made to medication list:   Medications added:   Augmentin  Other notes:   I tried to call NH 2x to get time of last dose and was unable to speak to a nurse. Please call with any questions.   Gabby Farr, PharmD  PGY-1 Pharmacy Resident  [id number]  10/15/2021 9:44 AM

## 2021-10-15 NOTE — PROGRESS NOTES
Dr. Stearns Knows here assessed pt, colst. Leaked, she helped changed her and placed new colst. 2 piece pouch, repositioned, blood infusing, B/p improved

## 2021-10-16 LAB
ANION GAP SERPL CALCULATED.3IONS-SCNC: 13 MMOL/L (ref 3–16)
ANISOCYTOSIS: ABNORMAL
BANDED NEUTROPHILS RELATIVE PERCENT: 1 % (ref 0–7)
BASOPHILS ABSOLUTE: 0 K/UL (ref 0–0.2)
BASOPHILS RELATIVE PERCENT: 0 %
BUN BLDV-MCNC: 55 MG/DL (ref 7–20)
CALCIUM SERPL-MCNC: 8.4 MG/DL (ref 8.3–10.6)
CHLORIDE BLD-SCNC: 89 MMOL/L (ref 99–110)
CO2: 25 MMOL/L (ref 21–32)
CREAT SERPL-MCNC: 3 MG/DL (ref 0.6–1.2)
EOSINOPHILS ABSOLUTE: 0.3 K/UL (ref 0–0.6)
EOSINOPHILS RELATIVE PERCENT: 4 %
ESTIMATED AVERAGE GLUCOSE: 125.5 MG/DL
GFR AFRICAN AMERICAN: 19
GFR NON-AFRICAN AMERICAN: 15
GLUCOSE BLD-MCNC: 112 MG/DL (ref 70–99)
GLUCOSE BLD-MCNC: 117 MG/DL (ref 70–99)
GLUCOSE BLD-MCNC: 122 MG/DL (ref 70–99)
GLUCOSE BLD-MCNC: 124 MG/DL (ref 70–99)
GLUCOSE BLD-MCNC: 137 MG/DL (ref 70–99)
GLUCOSE BLD-MCNC: 140 MG/DL (ref 70–99)
HBA1C MFR BLD: 6 %
HCT VFR BLD CALC: 27.3 % (ref 36–48)
HCT VFR BLD CALC: 28.7 % (ref 36–48)
HCT VFR BLD CALC: 29.3 % (ref 36–48)
HCT VFR BLD CALC: 29.3 % (ref 36–48)
HEMOGLOBIN: 9.4 G/DL (ref 12–16)
HEMOGLOBIN: 9.8 G/DL (ref 12–16)
HEMOGLOBIN: 9.9 G/DL (ref 12–16)
HEMOGLOBIN: 9.9 G/DL (ref 12–16)
IRON SATURATION: 34 % (ref 15–50)
IRON: 76 UG/DL (ref 37–145)
LYMPHOCYTES ABSOLUTE: 0.8 K/UL (ref 1–5.1)
LYMPHOCYTES RELATIVE PERCENT: 9 %
MAGNESIUM: 2 MG/DL (ref 1.8–2.4)
MCH RBC QN AUTO: 32.1 PG (ref 26–34)
MCHC RBC AUTO-ENTMCNC: 33.8 G/DL (ref 31–36)
MCV RBC AUTO: 95 FL (ref 80–100)
METAMYELOCYTES RELATIVE PERCENT: 1 %
MONOCYTES ABSOLUTE: 1.9 K/UL (ref 0–1.3)
MONOCYTES RELATIVE PERCENT: 22 %
NEUTROPHILS ABSOLUTE: 5.6 K/UL (ref 1.7–7.7)
NEUTROPHILS RELATIVE PERCENT: 63 %
ORGANISM: ABNORMAL
PDW BLD-RTO: 19.4 % (ref 12.4–15.4)
PERFORMED ON: ABNORMAL
PHOSPHORUS: 3.6 MG/DL (ref 2.5–4.9)
PLATELET # BLD: 135 K/UL (ref 135–450)
PMV BLD AUTO: 9.5 FL (ref 5–10.5)
POTASSIUM REFLEX MAGNESIUM: 3.7 MMOL/L (ref 3.5–5.1)
RBC # BLD: 3.09 M/UL (ref 4–5.2)
SODIUM BLD-SCNC: 127 MMOL/L (ref 136–145)
TOTAL IRON BINDING CAPACITY: 221 UG/DL (ref 260–445)
URINE CULTURE, ROUTINE: ABNORMAL
VANCOMYCIN RANDOM: 19.9 UG/ML
WBC # BLD: 8.6 K/UL (ref 4–11)

## 2021-10-16 PROCEDURE — 85025 COMPLETE CBC W/AUTO DIFF WBC: CPT

## 2021-10-16 PROCEDURE — 6370000000 HC RX 637 (ALT 250 FOR IP): Performed by: INTERNAL MEDICINE

## 2021-10-16 PROCEDURE — 2700000000 HC OXYGEN THERAPY PER DAY

## 2021-10-16 PROCEDURE — 99233 SBSQ HOSP IP/OBS HIGH 50: CPT | Performed by: HOSPITALIST

## 2021-10-16 PROCEDURE — 85014 HEMATOCRIT: CPT

## 2021-10-16 PROCEDURE — 83735 ASSAY OF MAGNESIUM: CPT

## 2021-10-16 PROCEDURE — 2580000003 HC RX 258: Performed by: INTERNAL MEDICINE

## 2021-10-16 PROCEDURE — 94761 N-INVAS EAR/PLS OXIMETRY MLT: CPT

## 2021-10-16 PROCEDURE — 36415 COLL VENOUS BLD VENIPUNCTURE: CPT

## 2021-10-16 PROCEDURE — 6360000002 HC RX W HCPCS

## 2021-10-16 PROCEDURE — 84100 ASSAY OF PHOSPHORUS: CPT

## 2021-10-16 PROCEDURE — 99223 1ST HOSP IP/OBS HIGH 75: CPT | Performed by: INTERNAL MEDICINE

## 2021-10-16 PROCEDURE — 80048 BASIC METABOLIC PNL TOTAL CA: CPT

## 2021-10-16 PROCEDURE — 6360000002 HC RX W HCPCS: Performed by: INTERNAL MEDICINE

## 2021-10-16 PROCEDURE — 85018 HEMOGLOBIN: CPT

## 2021-10-16 PROCEDURE — 2060000000 HC ICU INTERMEDIATE R&B

## 2021-10-16 PROCEDURE — 80202 ASSAY OF VANCOMYCIN: CPT

## 2021-10-16 PROCEDURE — 87641 MR-STAPH DNA AMP PROBE: CPT

## 2021-10-16 PROCEDURE — 99232 SBSQ HOSP IP/OBS MODERATE 35: CPT | Performed by: SURGERY

## 2021-10-16 RX ORDER — ATORVASTATIN CALCIUM 80 MG/1
80 TABLET, FILM COATED ORAL NIGHTLY
Status: DISCONTINUED | OUTPATIENT
Start: 2021-10-16 | End: 2021-10-21 | Stop reason: HOSPADM

## 2021-10-16 RX ORDER — POTASSIUM CHLORIDE 7.45 MG/ML
10 INJECTION INTRAVENOUS
Status: COMPLETED | OUTPATIENT
Start: 2021-10-16 | End: 2021-10-16

## 2021-10-16 RX ORDER — FLUCONAZOLE 200 MG/1
200 TABLET ORAL DAILY
Status: DISCONTINUED | OUTPATIENT
Start: 2021-10-16 | End: 2021-10-18

## 2021-10-16 RX ADMIN — MEROPENEM 500 MG: 500 INJECTION, POWDER, FOR SOLUTION INTRAVENOUS at 10:30

## 2021-10-16 RX ADMIN — METOPROLOL TARTRATE 12.5 MG: 25 TABLET, FILM COATED ORAL at 21:47

## 2021-10-16 RX ADMIN — MIDODRINE HYDROCHLORIDE 10 MG: 5 TABLET ORAL at 21:48

## 2021-10-16 RX ADMIN — POTASSIUM CHLORIDE 10 MEQ: 7.45 INJECTION INTRAVENOUS at 11:33

## 2021-10-16 RX ADMIN — SODIUM CHLORIDE 25 ML: 9 INJECTION, SOLUTION INTRAVENOUS at 10:11

## 2021-10-16 RX ADMIN — MIDODRINE HYDROCHLORIDE 10 MG: 5 TABLET ORAL at 10:14

## 2021-10-16 RX ADMIN — SODIUM CHLORIDE 25 ML: 9 INJECTION, SOLUTION INTRAVENOUS at 13:01

## 2021-10-16 RX ADMIN — INSULIN LISPRO 1 UNITS: 100 INJECTION, SOLUTION INTRAVENOUS; SUBCUTANEOUS at 18:30

## 2021-10-16 RX ADMIN — ATORVASTATIN CALCIUM 80 MG: 80 TABLET, FILM COATED ORAL at 21:48

## 2021-10-16 RX ADMIN — POTASSIUM CHLORIDE 10 MEQ: 7.45 INJECTION INTRAVENOUS at 10:13

## 2021-10-16 RX ADMIN — SODIUM CHLORIDE, PRESERVATIVE FREE 10 ML: 5 INJECTION INTRAVENOUS at 10:14

## 2021-10-16 RX ADMIN — POTASSIUM CHLORIDE 10 MEQ: 7.45 INJECTION INTRAVENOUS at 13:03

## 2021-10-16 RX ADMIN — SODIUM CHLORIDE, PRESERVATIVE FREE 10 ML: 5 INJECTION INTRAVENOUS at 21:47

## 2021-10-16 RX ADMIN — MIDODRINE HYDROCHLORIDE 10 MG: 5 TABLET ORAL at 14:24

## 2021-10-16 RX ADMIN — FLUCONAZOLE 200 MG: 200 TABLET ORAL at 16:44

## 2021-10-16 ASSESSMENT — PAIN SCALES - GENERAL
PAINLEVEL_OUTOF10: 0

## 2021-10-16 ASSESSMENT — PAIN SCALES - PAIN ASSESSMENT IN ADVANCED DEMENTIA (PAINAD)
NEGVOCALIZATION: 0
CONSOLABILITY: 0
BODYLANGUAGE: 0
CONSOLABILITY: 0
NEGVOCALIZATION: 0
FACIALEXPRESSION: 0
NEGVOCALIZATION: 0
BODYLANGUAGE: 0
FACIALEXPRESSION: 0
CONSOLABILITY: 0
CONSOLABILITY: 0
BODYLANGUAGE: 0
BODYLANGUAGE: 0
NEGVOCALIZATION: 0
CONSOLABILITY: 0
NEGVOCALIZATION: 0
TOTALSCORE: 0
CONSOLABILITY: 0
BREATHING: 0
BREATHING: 0
CONSOLABILITY: 0
FACIALEXPRESSION: 0
FACIALEXPRESSION: 0
NEGVOCALIZATION: 0
TOTALSCORE: 0
BREATHING: 0
CONSOLABILITY: 0
NEGVOCALIZATION: 0
BODYLANGUAGE: 0
TOTALSCORE: 0
CONSOLABILITY: 0
TOTALSCORE: 0
CONSOLABILITY: 0
BODYLANGUAGE: 0
TOTALSCORE: 0
TOTALSCORE: 0
BODYLANGUAGE: 0
BREATHING: 0
BODYLANGUAGE: 0
BODYLANGUAGE: 0
BREATHING: 0
FACIALEXPRESSION: 0
FACIALEXPRESSION: 0
NEGVOCALIZATION: 0
BREATHING: 0
BREATHING: 0
NEGVOCALIZATION: 0
BREATHING: 0
BREATHING: 0
BODYLANGUAGE: 0
TOTALSCORE: 0
FACIALEXPRESSION: 0
NEGVOCALIZATION: 0
FACIALEXPRESSION: 0
BREATHING: 0
FACIALEXPRESSION: 0
TOTALSCORE: 0
FACIALEXPRESSION: 0

## 2021-10-16 NOTE — CONSULTS
Consult Note      Kathye Fothergill  1950    Consultant: Ulises  Reason for Consult:  anemia  Requesting Physician:  Black    CHIEF COMPLAINT:  Mental status change    History Obtained From:  patient, electronic medical record    HISTORY OF PRESENT ILLNESS:                The patient is a 80 y.o. female with significant past medical history of DM who presents with confusion at her nursing home. Brought to the ER. Found to have low Hgb in the 5s. No melena. No hematochezia  Past Medical History:        Diagnosis Date    CAD (coronary artery disease)     s/p CABG 2000. Cath 9/10 1/3 grafts occluded. Patent LIMA-LAD, patent SVG-OM, 60% distal LAD lesion distal to anastamosis, occluded SVG-LCX,, 70% prox native LCx, diffuse RCA dz. Declines redo CABG.   will consider PCI of LAD and LCX    [facility]     Diabetes mellitus ([facility] [address] [facility]     followed by PCP    Hyperlipidemia     rec semi annual lipids w/ LDL goal <70    Lung mass     likely benign    Syncope     secondary to medications     Past Surgical History:        Procedure Laterality Date    BREAST SURGERY      left breast tumor [facility]   [facility]    CABG    CHOLECYSTECTOMY      CORONARY ANGIOPLASTY WITH STENT PLACEMENT  2019    CORONARY ARTERY BYPASS GRAFT      DIAGNOSTIC CARDIAC CATH LAB PROCEDURE      KNEE SURGERY      UPPER GASTROINTESTINAL ENDOSCOPY N/A 10/14/2019    EGD DIAGNOSTIC ONLY performed by Arturo Justice MD at [facility]     Medications at Home:  Medications Prior to Admission: amoxicillin-clavulanate (AUGMENTIN) 875-125 MG per tablet, Take 1 tablet by mouth 2 times daily To end 10/21/21  acetaminophen (TYLENOL) 325 MG tablet, 650 mg by Per G Tube route every 4 hours as needed for Pain or Fever  insulin lispro (HUMALOG) 100 UNIT/ML injection vial, Inject 0-10 Units into the skin every 6 hours Per Sliding scale  aspirin 81 MG chewable tablet, 81 mg by Per G Tube route daily  vitamin B complex (NATURES BLEND B COMPLEX) TABS tablet, 1 tablet by Per G Tube route daily  famotidine (PEPCID) 10 MG tablet, 10 mg by Per G Tube route daily For GERD for 30 days. atorvastatin (LIPITOR) 80 MG tablet, 80 mg by Per G Tube route nightly  midodrine (PROAMATINE) 10 MG tablet, 10 mg 3 times daily Via G tube  OLANZapine (ZYPREXA) 10 MG tablet, 10 mg by Per G Tube route nightly  insulin glargine (SEMGLEE) 100 UNIT/ML injection vial, Inject 27 Units into the skin 2 times daily  valproic acid (DEPAKENE) 250 MG/5ML SOLN oral solution, 130 mg by Per G Tube route 3 times daily  gabapentin (NEURONTIN) 100 MG capsule, Take 100 mg by mouth 3 times daily.   Current Medications:    Current Facility-Administered Medications: 0.9 % sodium chloride infusion, , IntraVENous, PRN  sodium chloride flush 0.9 % injection 5-40 mL, 5-40 mL, IntraVENous, 2 times per day  sodium chloride flush 0.9 % injection 5-40 mL, 5-40 mL, IntraVENous, PRN  0.9 % sodium chloride infusion, 25 mL, IntraVENous, PRN  ondansetron (ZOFRAN-ODT) disintegrating tablet 4 mg, 4 mg, Oral, Q8H PRN **OR** ondansetron (ZOFRAN) injection 4 mg, 4 mg, IntraVENous, Q6H PRN  polyethylene glycol (GLYCOLAX) packet 17 g, 17 g, Oral, Daily PRN  acetaminophen (TYLENOL) tablet 650 mg, 650 mg, Oral, Q6H PRN **OR** acetaminophen (TYLENOL) suppository 650 mg, 650 mg, Rectal, Q6H PRN  perflutren lipid microspheres (DEFINITY) injection 1.65 mg, 1.5 mL, IntraVENous, ONCE PRN  glucose (GLUTOSE) 40 % oral gel 15 g, 15 g, Oral, PRN  glucagon (rDNA) injection 1 mg, 1 mg, IntraMUSCular, PRN  dextrose 5 % solution, 100 mL/hr, IntraVENous, PRN  dextrose 50 % IV solution, 12.5 g, IntraVENous, PRN  insulin lispro (1 Unit Dial) 0-6 Units, 0-6 Units, SubCUTAneous, Q6H  0.9 % sodium chloride infusion, , IntraVENous, PRN  meropenem (MERREM) 500 mg IVPB (mini-bag), 500 mg, IntraVENous, Q24H  midodrine (PROAMATINE) tablet 10 mg, 10 mg, PEG Tube, TID  vancomycin (VANCOCIN) intermittent dosing (placeholder), , Other, See Admin Instructions  influenza quadrivalent split vaccine (FLUZONE;FLUARIX;FLULAVAL;AFLURIA) injection 0.5 mL, 0.5 mL, IntraMUSCular, Prior to discharge  Allergies:  Iodine    Social History:    TOBACCO:   reports that she has never smoked. She has never used smokeless tobacco.  ETOH:   reports no history of alcohol use. DRUGS:   reports no history of drug use. Family History:       Problem Relation Age of Onset    Stroke Mother     Heart Disease Father      REVIEW OF SYSTEMS:    A comprehensive 12 point review of systems is negative except as documented above. PHYSICAL EXAM:      Vitals:    /61   Pulse 80   Temp 98.5 °F (36.9 °C) (Axillary)   Resp 18   Ht 5' 3\" (1.6 m)   Wt 114 lb (51.7 kg)   LMP  (LMP Unknown)   SpO2 100%   BMI 20.19 kg/m²     General: No acute distress  HEENT: PERRL, EOMI, oral mucosa moist and intact, no sclericterus  Neck: no thyromegaly  Lymphatic: no cervical or supraclavicular lymphadenopathy  Heart: no m/r/g; +s1/s2 rrr  Lungs: CTA bilaterally  Abdomen: Ostomy with brown stool; surgical wounds  Extremities: 2+pulses, no edema  Skin: no rashes or lesions  Neuro: a&o x 3; no gross deficit  DATA:    Recent Labs     10/15/21  0947 10/15/21  1455 10/15/21  1955 10/15/21  2215 10/16/21  0529   WBC 9.1  --   --   --  8.6   HGB 5.3*   < > 8.2* 9.6* 9.9*   HCT 15.6*   < > 24.4* 28.4* 29.3*   .3*  --   --   --  95.0     --   --   --  135    < > = values in this interval not displayed. Recent Labs     10/15/21  0947 10/16/21  0529   * 127*   K 3.5 3.7   CL 82* 89*   CO2 26 25   PHOS  --  3.6   BUN 52* 55*   CREATININE 2.7* 3.0*     Recent Labs     10/15/21  0947   AST 35   ALT 22   BILITOT <0.2   ALKPHOS 183*     No results for input(s): LIPASE, AMYLASE in the last 72 hours. Recent Labs     10/15/21  0947 10/15/21  1955   PROT 5.8*  --    INR  --  0.95     No results for input(s): PTT in the last 72 hours.   No results for input(s): OCCULTBLD in the last 72 hours. Imaging: CTPrevious endoscopy: EGD normal 2019    IMPRESSION/RECOMMENDATIONS:      Anemia    There is brown stool in her ostomy bag. No signs of a GI bleed at this point. Patient needs to have sodium corrected before sedation and EGD can be done  Treat medically with BID PPI  Ok to give diet      Thank you for allowing me to participate in [facility]. Awilda Cheng.  Lizabeth Maldonado MD

## 2021-10-16 NOTE — PLAN OF CARE
Problem: Skin Integrity:  Goal: Will show no infection signs and symptoms  Description: Will show no infection signs and symptoms  Outcome: Ongoing  Goal: Absence of new skin breakdown  Description: Absence of new skin breakdown  Outcome: Ongoing  Note: RN turned and positioned patient every 2 hours to prevent any new skin breakdown     Problem: Discharge Planning:  Goal: Discharged to appropriate level of care  Description: Discharged to appropriate level of care  Outcome: Ongoing     Problem: Cardiac:  Goal: Hemodynamic stability will improve  Description: Hemodynamic stability will improve  Outcome: Ongoing     Problem: Fluid Volume:  Goal: Will show no signs or symptoms of fluid imbalance  Description: Will show no signs or symptoms of fluid imbalance  Outcome: Ongoing  Goal: Ability to achieve a balanced intake and output will improve  Description: Ability to achieve a balanced intake and output will improve  Outcome: Ongoing     Problem: OXYGENATION/RESPIRATORY FUNCTION  Goal: Patient will maintain patent airway  Outcome: Ongoing  Goal: Patient will achieve/maintain normal respiratory rate/effort  Description: Respiratory rate and effort will be within normal limits for the patient  Outcome: Ongoing     Problem: HEMODYNAMIC STATUS  Goal: Patient has stable vital signs and fluid balance  Outcome: Ongoing     Problem: FLUID AND ELECTROLYTE IMBALANCE  Goal: Fluid and electrolyte balance are achieved/maintained  Outcome: Ongoing     Problem: ACTIVITY INTOLERANCE/IMPAIRED MOBILITY  Goal: Mobility/activity is maintained at optimum level for patient  Outcome: Ongoing     Problem: Physical Regulation:  Goal: Ability to maintain clinical measurements within normal limits will improve  Description: Ability to maintain clinical measurements within normal limits will improve  Outcome: Ongoing  Goal: Will show no signs and symptoms of electrolyte imbalance  Description: Will show no signs and symptoms of electrolyte imbalance  Outcome: Ongoing     Problem: Falls - Risk of:  Goal: Will remain free from falls  Description: Will remain free from falls  Outcome: Ongoing  Goal: Absence of physical injury  Description: Absence of physical injury  Outcome: Ongoing

## 2021-10-16 NOTE — PROGRESS NOTES
Clinical Pharmacy Progress Note    Vancomycin - Management by Pharmacy    Consult Date(s): 10/15  Consulting Provider(s): Dr. Jean-Pierre Mattson / Plan  Sepsis - Vancomycin   Concurrent Antimicrobials: Meropenem   Day of Vanc Therapy: 2   Current Dosing Method: Intermittent   Therapeutic Goal: 15-20 mg/L   Current Dose / Frequency:intermittent   Plan / Rationale:   o Patient with ESRD on HD, MWF. Will dose intermittently. o Received 1250 mg LD in ED 10/15 @ 11:19.   o Random level this AM= 19.9 mcg/mL. Plan is to continue HD maintenance on MWF, will not give dose today. o Will order level for Monday prior to dialysis. Will order sooner if patient receives dialysis.  Will continue to monitor clinical condition and make adjustments to regimen as appropriate. Thanks for consulting pharmacy! Please call with questions [phone]. [facility] Pharmacy Resident   10/16/2021 12:48 PM      Interval update: Sodium correction needed prior to EGD. Subjective/Objective: Ms. Modesta Riddle is a 80 y.o. female with a PMHx significant for ESRD on HD MWF, CAD s/p CABG, DMII, HLD, CVA w/ right-sided deficits, HTN, admission for septic shock in 7/2021 secondary to ischemic bowel, nstemi type 2 due to demand ischemia, dysphagia, anemia, admitted for acute encephalopathy 2/2 sepsis. Pharmacy has been consulted to dose vanomcyin. Height:   Ht Readings from Last 1 Encounters:   10/15/21 5' 3\" (1.6 m)     Weight:   Wt Readings from Last 1 Encounters:   10/15/21 114 lb (51.7 kg)       Level(s) / Doses:    Date Time Dose Level / Type of Level Interpretation   10/16 05:29 1250mg LD x1 Random = 19/9 Will not dose today          Note: Serum levels collected for AUC-based dosing may be high if collected in close proximity to the dose administered. This is not necessarily an indicator of toxicity.     Cultures & Sensitivities:    Date Site Micro Susceptibility / Result   10/15 Urine Sent    10/15 Blood Sent Labs / Ancillary Data:    ESRD on HD [facility]    Recent Labs     10/15/21  0947 10/16/21  0529   CREATININE 2.7* 3.0*   BUN 52* 55*   WBC 9.1 8.6       Additional Lab Values / Findings of Note:

## 2021-10-16 NOTE — CONSULTS
Clinical Pharmacy Progress Note  Medication History     Med Rec completed in ED on 10/15, see note below:     =============================================================  Admit Date: 10/15/2021    List of of current medications patient is taking is complete. Home Medication list in EPIC updated to reflect changes noted below. Source of information: [facility] paperwork    Patient's home pharmacy: [facility]      Changes made to medication list:   Medications added:    Augmentin  Other notes:    I tried to call NH 2x to get time of last dose and was unable to speak to a nurse. Please call with any questions. Sheri Horvath, PharmD  PGY-1 Pharmacy Resident  [id number]  10/16/2021 2:39 PM  ==============================================================    Thanks for consulting pharmacy! Please call with questions [phone] [place] [address] [place]   Pharmacy Resident   10/16/2021 2:41 PM

## 2021-10-16 NOTE — CONSULTS
Cardiology Consultation                                                                    Pt Name: Gonzalo Clemente  Age: 80 y.o. Sex: female  : 1950  Location: [id number]/9824-01    Referring Physician: Aracely Khoury DO  Primary cardiologist: Dr New Kumar / Dr Jt Calle      Reason for Consult:     Reason for Consultation/Chief Complaint: elevated troponin    HPI:      Gonzalo Clemente is a 80 y.o. female with a past medical history of HTN, HLP, DM, carotid disease, CAD s/p CABG s/p DOT (2019), ESRD on HD, CVA with R sided deficits, sepsis from ischemic bowel in 2021. LHC 2019: oLM 80%, pCx 90%, mCx 75%, s/p PCI with DOT x 3.     Echo : normal, diastolic I. Patient was brought to the emergency room on 10/15 with altered mental status and confusion. Patient was admitted for sepsis due to UTI versus CAP, acute on chronic anemia (hemoglobin 5.3), elevated troponin (0.85 on admission). Cardiology was consulted to assess patient in view of elevated troponin. Patient was given IV fluids as bolus, IV antibiotics, started on midodrine 10 mg p.o. every 8 hours. GI was consulted and are planning EGD soon. Chest x-ray is suggestive of right-sided consolidation. ECG 10/15/21: sinus 96 bpm, ST and T wave abnormalities, cannot exclude ischemia (mildly worse compared to prior ST-T wave abnormalities). Patient is unable to provide any history today, therefore history was obtained from EMR. T-max 102.4 Fahrenheit, patient is currently on 2 L of supplemental oxygen, hemodynamically stable. She received 1 unit of packed RBC, hemoglobin now up at 9.9. Histories     Past Medical History:   has a past medical history of CAD (coronary artery disease), Carotid artery disease ([address] Diabetes mellitus ([address] Hyperlipidemia, Lung mass, and Syncope. Surgical History:   has a past surgical history that includes Cardiac surgery ();  Cholecystectomy; Breast surgery; Coronary artery bypass graft; knee surgery; Upper gastrointestinal endoscopy (N/A, 10/14/2019); Diagnostic Cardiac Cath Lab Procedure; and Coronary angioplasty with stent (2019). Social History:   reports that she has never smoked. She has never used smokeless tobacco. She reports that she does not drink alcohol and does not use drugs. Family History:  No evidence for sudden cardiac death or premature CAD      Medications:       Home Medications  Were reviewed and are listed in nursing record. and/or listed below  Prior to Admission medications    Medication Sig Start Date End Date Taking? Authorizing Provider   amoxicillin-clavulanate (AUGMENTIN) 875-125 MG per tablet Take 1 tablet by mouth 2 times daily To end 10/21/21 10/7/21 10/21/21 Yes Historical Provider, MD   acetaminophen (TYLENOL) 325 MG tablet 650 mg by Per G Tube route every 4 hours as needed for Pain or Fever   Yes Historical Provider, MD   insulin lispro (HUMALOG) 100 UNIT/ML injection vial Inject 0-10 Units into the skin every 6 hours Per Sliding scale   Yes Historical Provider, MD   aspirin 81 MG chewable tablet 81 mg by Per G Tube route daily   Yes Historical Provider, MD   vitamin B complex (NATURES BLEND B COMPLEX) TABS tablet 1 tablet by Per G Tube route daily   Yes Historical Provider, MD   famotidine (PEPCID) 10 MG tablet 10 mg by Per G Tube route daily For GERD for 30 days.  9/24/21 10/24/21 Yes Historical Provider, MD   atorvastatin (LIPITOR) 80 MG tablet 80 mg by Per G Tube route nightly   Yes Historical Provider, MD   midodrine (PROAMATINE) 10 MG tablet 10 mg 3 times daily Via G tube   Yes Historical Provider, MD   OLANZapine (ZYPREXA) 10 MG tablet 10 mg by Per G Tube route nightly   Yes Historical Provider, MD   insulin glargine (SEMGLEE) 100 UNIT/ML injection vial Inject 27 Units into the skin 2 times daily   Yes Historical Provider, MD   valproic acid (DEPAKENE) 250 MG/5ML SOLN oral solution 130 mg by Per G Tube route 3 times daily   Yes Historical Provider, MD   gabapentin (NEURONTIN) 100 MG capsule Take 100 mg by mouth 3 times daily. Yes Historical Provider, MD          Inpatient Medications:   atorvastatin  80 mg Oral Nightly    metoprolol tartrate  12.5 mg Oral BID    sodium chloride flush  5-40 mL IntraVENous 2 times per day    insulin lispro  0-6 Units SubCUTAneous Q6H    meropenem  500 mg IntraVENous Q24H    midodrine  10 mg PEG Tube TID    vancomycin (VANCOCIN) intermittent dosing (placeholder)   Other See Admin Instructions    influenza virus vaccine  0.5 mL IntraMUSCular Prior to discharge       IV drips:   sodium chloride      sodium chloride 25 mL (10/16/21 1301)    dextrose      sodium chloride         PRN:  sodium chloride, sodium chloride flush, sodium chloride, ondansetron **OR** ondansetron, polyethylene glycol, acetaminophen **OR** acetaminophen, perflutren lipid microspheres, glucose, glucagon (rDNA), dextrose, dextrose, sodium chloride    Allergy:     Iodine       Review of Systems:     Unable to obtain due to mental status.       Physical Examination:     Vitals:    10/16/21 0814 10/16/21 0904 10/16/21 1015 10/16/21 1204   BP: (!) 106/56  (!) 98/58 (!) 120/58   Pulse: 82  85 83   Resp: 18   22   Temp: 98.2 °F (36.8 °C)   98.9 °F (37.2 °C)   TempSrc: Axillary   Axillary   SpO2: 100% 100% 98% 100%   Weight:       Height:           Wt Readings from Last 3 Encounters:   10/15/21 114 lb (51.7 kg)   10/07/21 113 lb 5.1 oz (51.4 kg)   08/05/20 95 lb 1.6 oz (43.1 kg)         General Appearance:  Alert, cooperative, no distress, appears stated age Appropriate weight   Head:  Normocephalic, without obvious abnormality, atraumatic   Eyes:  PERRL, conjunctiva/corneas clear EOM intact  Ears normal   Throat no lesions       Nose: Nares normal, no drainage or sinus tenderness   Throat: Lips, mucosa, and tongue normal   Neck: Supple, symmetrical, trachea midline, no adenopathy, thyroid: not enlarged, symmetric, no tenderness/mass/nodules, no carotid bruit.        Lungs:   Respirations unlabored, clear to auscultation bilaterally, without any wheezes, rubs or ronchi. Chest Wall:  No tenderness or deformity   Heart:  Regular rhythm, rate is controlled, S1, S2 normal, there is no murmur, there is no rub or gallop, cannot assess jvd, no bilateral lower extremity edema   Abdomen:   Soft, non-tender, bowel sounds active all four quadrants,  no masses, no organomegaly       Extremities: Extremities normal, atraumatic, no cyanosis. Pulses: 2+ and symmetric   Skin: Skin color, texture, turgor normal, no rashes or lesions   Pysch: Normal mood and affect   Neurologic: Normal gross motor and sensory exam.  Cranial nerves intact        Labs:     Recent Labs     10/15/21  0947 10/16/21  0529   * 127*   K 3.5 3.7   BUN 52* 55*   CREATININE 2.7* 3.0*   CL 82* 89*   CO2 26 25   GLUCOSE 308* 122*   CALCIUM 8.9 8.4   MG 1.90 2.00     Recent Labs     10/15/21  0947 10/15/21  0947 10/15/21  1455 10/15/21  1955 10/15/21  2215 10/16/21  0529 10/16/21  1103   WBC 9.1  --   --   --   --  8.6  --    HGB 5.3*   < > 6.9* 8.2* 9.6* 9.9* 9.8*   HCT 15.6*   < > 20.3* 24.4* 28.4* 29.3* 28.7*     --   --   --   --  135  --    .3*   < >  --   --   --  95.0  --     < > = values in this interval not displayed. No results for input(s): CHOLTOT, TRIG, HDL in the last 72 hours. Invalid input(s): LIPIDCOMM, CHOLHDL, VLDCHOL, LDL  Recent Labs     10/15/21  1955   INR 0.95     Recent Labs     10/15/21  0947 10/15/21  1152   TROPONINI 0.81* 0.85*     No results for input(s): BNP in the last 72 hours. No results for input(s): TSH in the last 72 hours. No results for input(s): CHOL, HDL, LDLCALC, TRIG in the last 72 hours.]    Lab Results   Component Value Date    TROPONINI 0.85 ([facility]) 10/15/2021         Imaging:     Telemetry personally reviewed:  NSR      Assessment / Plan:     1.   Elevated troponin:  This is most likely due to demand ischemia in the setting of well-known severe underlying CAD, sepsis and ESRD on hemodialysis rather than true NSTEMI (no evidence of ACS). -Will start low-dose Lopressor 12.5 twice daily as BP allows  -We will start Lipitor 80 mg daily  -Aspirin has been held in anticipation of EGD by GI in view of severe anemia. 2.  CAD status post CABG and stents:  Per #1. 3.  HLP:  We will start statin    4. Essential hypertension:  Patient is actually hypotensive due to sepsis and currently on midodrine high-dose every 8 hours.    -No antihypertensive medications are necessary at this time. 5.  Sepsis:  Could be due to community-acquired pneumonia versus UTI. -Per primary team on IV antibiotics, IV fluids as needed, midodrine 10 mg every 8 hours. 6.  Severe anemia:  Cannot exclude GI bleeding.    -Per gastroenterology, plan for EGD soon  -Aspirin has been held. Also we would not be able to use anticoagulation and therefore ischemic evaluation will have to be postponed as an outpatient. I have personally reviewed the reports and images of labs, radiological studies, cardiac studies including ECG's and telemetry, current and old medical records. The note was completed using EMR and Dragon dictation system. Every effort was made to ensure accuracy; however, inadvertent computerized transcription errors may be present. All questions and concerns were addressed to the patient/family. Alternatives to my treatment were discussed. I would like to thank you for providing me the opportunity to participate in the care of your patient. If you have any questions, please do not hesitate to contact me.      Lilia Espino MD, [address] [facility], [address]  The [address]  [facility] [address]  Ph: [phone]  Fax: [phone]

## 2021-10-16 NOTE — PLAN OF CARE
Problem: Skin Integrity:  Goal: Will show no infection signs and symptoms  Description: Will show no infection signs and symptoms  Outcome: Ongoing     Problem: Skin Integrity:  Goal: Absence of new skin breakdown  Description: Absence of new skin breakdown  Outcome: Ongoing  Patient is getting Q2 turns to help prevent break down. Problem: Cardiac:  Goal: Hemodynamic stability will improve  Description: Hemodynamic stability will improve  Outcome: Ongoing  Patients blood pressure are improving since getting 2 units of blood, and midodrine medication. Patients blood pressure in the low 100's over 60's.      Problem: OXYGENATION/RESPIRATORY FUNCTION  Goal: Patient will maintain patent airway  Outcome: Ongoing       Problem: OXYGENATION/RESPIRATORY FUNCTION  Goal: Patient will achieve/maintain normal respiratory rate/effort  Description: Respiratory rate and effort will be within normal limits for the patient  Outcome: Ongoing

## 2021-10-16 NOTE — PROGRESS NOTES
ondansetron **OR** ondansetron, polyethylene glycol, acetaminophen **OR** acetaminophen, perflutren lipid microspheres, glucose, glucagon (rDNA), dextrose, dextrose, sodium chloride      Intake/Output Summary (Last 24 hours) at 10/16/2021 0919  Last data filed at 10/16/2021 8269  Gross per 24 hour   Intake 1962.12 ml   Output 275 ml   Net 1687.12 ml       Exam:    BP (!) 106/56   Pulse 82   Temp 98.2 °F (36.8 °C) (Axillary)   Resp 18   Ht 5' 3\" (1.6 m)   Wt 114 lb (51.7 kg)   LMP  (LMP Unknown)   SpO2 100%   BMI 20.19 kg/m²     General appearance: No apparent distress, appears stated age and cooperative. HEENT: Pupils equal, round, and reactive to light. Conjunctivae/corneas clear. Neck: Supple, with full range of motion. No jugular venous distention. Trachea midline. Respiratory:  Normal respiratory effort. Clear to auscultation, bilaterally without Rales/Wheezes/Rhonchi. Cardiovascular: Regular rate and rhythm with normal S1/S2 without murmurs, rubs or gallops. Abdomen: Soft, non-tender, non-distended with normal bowel sounds. Musculoskelatal: No clubbing, cyanosis or edema bilaterally. Skin: Skin color, texture, turgor normal.  No rashes or lesions. Neurologic:  Cranial nerves: II-XII intact, grossly non-focal.  Psychiatric: Alert and oriented, thought content appropriate, normal insight    Labs:   Recent Labs     10/15/21  0947 10/15/21  1455 10/15/21  1955 10/15/21  2215 10/16/21  0529   WBC 9.1  --   --   --  8.6   HGB 5.3*   < > 8.2* 9.6* 9.9*   HCT 15.6*   < > 24.4* 28.4* 29.3*     --   --   --  135    < > = values in this interval not displayed.      Recent Labs     10/15/21  0947 10/16/21  0529   * 127*   K 3.5 3.7   CL 82* 89*   CO2 26 25   BUN 52* 55*   CREATININE 2.7* 3.0*   CALCIUM 8.9 8.4   PHOS  --  3.6     Recent Labs     10/15/21  0947   AST 35   ALT 22   BILITOT <0.2   ALKPHOS 183*     Recent Labs     10/15/21  1955   INR 0.95     Recent Labs     10/15/21  0947 10/15/21  1152   TROPONINI 0.81* 0.85*       Studies:  XR CHEST PORTABLE   Final Result      New airspace disease in the right lung. Correlate for an acute infectious process. CT ABDOMEN PELVIS WO CONTRAST Additional Contrast? None   Final Result      Postsurgical changes are present in the abdomen and pelvis as detailed above. No convincing evidence of an acute GI tract abnormality. Indeterminate hypodense lesion in the right hepatic lobe. Recommend further evaluation with liver MRI with and without contrast.      Bilateral pleural effusions, right greater than left. Right lower lobe consolidation, presumably passive atelectasis. CT Head WO Contrast   Final Result      No acute intracranial hemorrhage or mass effect. Old insult in the brain as detailed above. Cerebral atrophy.              Assessment/Plan:    Active Hospital Problems    Diagnosis Date Noted    Sepsis ([facility] [address] [A41.9] 10/15/2021     Hypotension likely d/t sepsis (Temp 102.4, hypotension, purulent drainage noted from rectum in ED)  Acute encephalopathy in the setting of prior CVA, dementia, per chart review oriented to self at baseline  Hyponatremia  ESRD on HD  UTI  Acute on chronic anemia  History of septic shock for ischemic colitis and NSTEMI type II in 7/2021  Hx of CVA with R-sided deficits  Dysphagia with PEG tube at baseline  Troponin elevation   CAD s/p CABG 2000  B/l pleural effusions  Iodine allergy  DMII     PLAN:     Acute encephalopathy in the setting of prior CVA, dementia, per chart review oriented to self at baseline  CT head no acute abnormalities  Multiple etiology including sepsis, hyponatremia  Treatment as below     Severe Sepsis, lactic acid > 4  Hx of septic shock with ischemic colitis, purulence noted for rectum per ED providers  UA with possible UTI vs PNA, has central access  Blood and urine cultures ordered  On vancomycin and meropenem  General surgery consulted      Candida UTI  Start Fluconazole    Hypotension  Treatment for sepsis with antibiotics and IV fluids approx 2 L  Patient has also been on midodrine, restarted     Hyponatremia  ESRD on HD  Nephrology consulted for HD  Receiving IV fluids     Acute on chronic anemia, severe  Hgb 5.3 in ED  Per chart review there is a note with concern for black stools though she is on iron therapy  Iron studies  Protonix IV BID for now  FOBT  GI consultation, recs appreciated  S/p 2 units pRBCs     Acute hypoxemic respiratory failure  B/l pleural effusions on imaging, atelectasis vs PNA  Antibiotics as above     Troponin elevation likely due to demand ischemia in the setting of sepsis and ESRD  CAD s/p CABG 2000  Patient has ST changes that appear similar but more pronounced since her last EKG  Hold anticoagulation due to acute anemia with Hgb of 5  Transfusion goal Hgb >8  Cardiology consulted  Ischemic evaluation will be needed outpatient  Lopressor low dose as BP allows  Statin restarted     DMII  Low dose sliding scale on NPO algorithm     Dysphagia with PEG tube at baseline     Pharmacy med/rec     DVT Prophylaxis: SCDs only for now  Diet: Diet NPO Exceptions are: Sips of Water with Meds  Code Status: Limited    PT/OT Eval Status:     Dispo - Inpatient    Erma Street DO

## 2021-10-16 NOTE — PROGRESS NOTES
Office: [phone]       Fax: [phone]      Nephrology Daily Note        Patient's Name: Marva Roberts  Date of Visit: 10/16/2021    Reason for Consult:  ESRD  Subjective:   Admit Date: 10/15/2021        INTERVAL HISTORY    Feels same  Shortness of breath: No     No nausea or vomiting     DIET Diet NPO Exceptions are: Sips of Water with Meds  Medications:   Scheduled Meds:   atorvastatin  80 mg Oral Nightly    metoprolol tartrate  12.5 mg Oral BID    potassium chloride  10 mEq IntraVENous Q1H    sodium chloride flush  5-40 mL IntraVENous 2 times per day    insulin lispro  0-6 Units SubCUTAneous Q6H    meropenem  500 mg IntraVENous Q24H    midodrine  10 mg PEG Tube TID    vancomycin (VANCOCIN) intermittent dosing (placeholder)   Other See Admin Instructions    influenza virus vaccine  0.5 mL IntraMUSCular Prior to discharge     Continuous Infusions:   sodium chloride      sodium chloride 25 mL (10/16/21 1011)    dextrose      sodium chloride         Labs:  CBC:   Recent Labs     10/15/21  0947 10/15/21  1455 10/15/21  1955 10/15/21  2215 10/16/21  0529   WBC 9.1  --   --   --  8.6   HGB 5.3*   < > 8.2* 9.6* 9.9*     --   --   --  135    < > = values in this interval not displayed.      Ca/Mg/Phos:   Recent Labs     10/15/21  0947 10/16/21  0529   CALCIUM 8.9 8.4   MG 1.90 2.00   PHOS  --  3.6     UA:  Recent Labs     10/15/21  0947   COLORU Yellow   CLARITYU TURBID*   GLUCOSEU Negative   BILIRUBINUR Negative   KETUA Negative   SPECGRAV 1.020   BLOODU MODERATE*   PHUR 6.0   PROTEINU 100*   UROBILINOGEN 0.2   NITRU Negative   LEUKOCYTESUR LARGE*   LABMICR YES   URINETYPE NotGiven      Urine Microscopic:   Recent Labs     10/15/21  0947   BACTERIA 2+*   WBCUA 21-50*   RBCUA 3-4   EPIU 2-5     Urine Chemistry: No results for input(s): CLUR, Anemia    4. CAD    Plan:     -maintenance HD with UF as tolerated  -anemia management  -MBD management  -midodrine  -minimize IVF  -Antimicrobials per primary team   -dose meds to GFR<10      Thank you for allowing us to participate in care of Christina Thomason . We will continue to follow. Feel free to contact me with any questions.       Clare Coleman MD  10/16/2021    [facility] [address] [place] [address] S  Office : [phone]  Fax :[phone]

## 2021-10-17 LAB
ANION GAP SERPL CALCULATED.3IONS-SCNC: 14 MMOL/L (ref 3–16)
BASOPHILS ABSOLUTE: 0 K/UL (ref 0–0.2)
BASOPHILS RELATIVE PERCENT: 0 %
BUN BLDV-MCNC: 63 MG/DL (ref 7–20)
CALCIUM SERPL-MCNC: 8.4 MG/DL (ref 8.3–10.6)
CHLORIDE BLD-SCNC: 90 MMOL/L (ref 99–110)
CO2: 22 MMOL/L (ref 21–32)
CREAT SERPL-MCNC: 2.6 MG/DL (ref 0.6–1.2)
EOSINOPHILS ABSOLUTE: 0.1 K/UL (ref 0–0.6)
EOSINOPHILS RELATIVE PERCENT: 1 %
GFR AFRICAN AMERICAN: 22
GFR NON-AFRICAN AMERICAN: 18
GLUCOSE BLD-MCNC: 105 MG/DL (ref 70–99)
GLUCOSE BLD-MCNC: 117 MG/DL (ref 70–99)
GLUCOSE BLD-MCNC: 146 MG/DL (ref 70–99)
GLUCOSE BLD-MCNC: 166 MG/DL (ref 70–99)
HCT VFR BLD CALC: 27.7 % (ref 36–48)
HCT VFR BLD CALC: 27.8 % (ref 36–48)
HCT VFR BLD CALC: 30.6 % (ref 36–48)
HEMOGLOBIN: 10.3 G/DL (ref 12–16)
HEMOGLOBIN: 9.4 G/DL (ref 12–16)
HEMOGLOBIN: 9.4 G/DL (ref 12–16)
LYMPHOCYTES ABSOLUTE: 1.7 K/UL (ref 1–5.1)
LYMPHOCYTES RELATIVE PERCENT: 15 %
MAGNESIUM: 2 MG/DL (ref 1.8–2.4)
MCH RBC QN AUTO: 31.8 PG (ref 26–34)
MCHC RBC AUTO-ENTMCNC: 33.9 G/DL (ref 31–36)
MCV RBC AUTO: 94 FL (ref 80–100)
MONOCYTES ABSOLUTE: 2.5 K/UL (ref 0–1.3)
MONOCYTES RELATIVE PERCENT: 22 %
MRSA SCREEN RT-PCR: NORMAL
NEUTROPHILS ABSOLUTE: 6.9 K/UL (ref 1.7–7.7)
NEUTROPHILS RELATIVE PERCENT: 62 %
PDW BLD-RTO: 19.6 % (ref 12.4–15.4)
PERFORMED ON: ABNORMAL
PHOSPHORUS: 4.2 MG/DL (ref 2.5–4.9)
PLATELET # BLD: 182 K/UL (ref 135–450)
PMV BLD AUTO: 9.2 FL (ref 5–10.5)
POLYCHROMASIA: ABNORMAL
POTASSIUM REFLEX MAGNESIUM: 4.4 MMOL/L (ref 3.5–5.1)
RBC # BLD: 2.95 M/UL (ref 4–5.2)
SODIUM BLD-SCNC: 126 MMOL/L (ref 136–145)
WBC # BLD: 11.2 K/UL (ref 4–11)

## 2021-10-17 PROCEDURE — 99232 SBSQ HOSP IP/OBS MODERATE 35: CPT | Performed by: SURGERY

## 2021-10-17 PROCEDURE — 85014 HEMATOCRIT: CPT

## 2021-10-17 PROCEDURE — 80048 BASIC METABOLIC PNL TOTAL CA: CPT

## 2021-10-17 PROCEDURE — 94761 N-INVAS EAR/PLS OXIMETRY MLT: CPT

## 2021-10-17 PROCEDURE — 99233 SBSQ HOSP IP/OBS HIGH 50: CPT | Performed by: HOSPITALIST

## 2021-10-17 PROCEDURE — 51798 US URINE CAPACITY MEASURE: CPT

## 2021-10-17 PROCEDURE — 85018 HEMOGLOBIN: CPT

## 2021-10-17 PROCEDURE — 6360000002 HC RX W HCPCS: Performed by: INTERNAL MEDICINE

## 2021-10-17 PROCEDURE — 83735 ASSAY OF MAGNESIUM: CPT

## 2021-10-17 PROCEDURE — 6370000000 HC RX 637 (ALT 250 FOR IP): Performed by: INTERNAL MEDICINE

## 2021-10-17 PROCEDURE — 2700000000 HC OXYGEN THERAPY PER DAY

## 2021-10-17 PROCEDURE — 2580000003 HC RX 258: Performed by: INTERNAL MEDICINE

## 2021-10-17 PROCEDURE — 2060000000 HC ICU INTERMEDIATE R&B

## 2021-10-17 PROCEDURE — 99232 SBSQ HOSP IP/OBS MODERATE 35: CPT | Performed by: NURSE PRACTITIONER

## 2021-10-17 PROCEDURE — 36415 COLL VENOUS BLD VENIPUNCTURE: CPT

## 2021-10-17 PROCEDURE — 85025 COMPLETE CBC W/AUTO DIFF WBC: CPT

## 2021-10-17 PROCEDURE — 84100 ASSAY OF PHOSPHORUS: CPT

## 2021-10-17 RX ADMIN — METOPROLOL TARTRATE 12.5 MG: 25 TABLET, FILM COATED ORAL at 20:20

## 2021-10-17 RX ADMIN — INSULIN LISPRO 1 UNITS: 100 INJECTION, SOLUTION INTRAVENOUS; SUBCUTANEOUS at 18:12

## 2021-10-17 RX ADMIN — MIDODRINE HYDROCHLORIDE 10 MG: 5 TABLET ORAL at 09:05

## 2021-10-17 RX ADMIN — SODIUM CHLORIDE, PRESERVATIVE FREE 10 ML: 5 INJECTION INTRAVENOUS at 09:23

## 2021-10-17 RX ADMIN — FLUCONAZOLE 200 MG: 200 TABLET ORAL at 09:05

## 2021-10-17 RX ADMIN — MIDODRINE HYDROCHLORIDE 10 MG: 5 TABLET ORAL at 16:18

## 2021-10-17 RX ADMIN — SODIUM CHLORIDE, PRESERVATIVE FREE 10 ML: 5 INJECTION INTRAVENOUS at 20:21

## 2021-10-17 RX ADMIN — METOPROLOL TARTRATE 12.5 MG: 25 TABLET, FILM COATED ORAL at 09:05

## 2021-10-17 RX ADMIN — MIDODRINE HYDROCHLORIDE 10 MG: 5 TABLET ORAL at 20:20

## 2021-10-17 RX ADMIN — INSULIN LISPRO 1 UNITS: 100 INJECTION, SOLUTION INTRAVENOUS; SUBCUTANEOUS at 12:25

## 2021-10-17 RX ADMIN — MEROPENEM 500 MG: 500 INJECTION, POWDER, FOR SOLUTION INTRAVENOUS at 09:21

## 2021-10-17 RX ADMIN — ATORVASTATIN CALCIUM 80 MG: 80 TABLET, FILM COATED ORAL at 20:20

## 2021-10-17 ASSESSMENT — PAIN SCALES - PAIN ASSESSMENT IN ADVANCED DEMENTIA (PAINAD)
BODYLANGUAGE: 0
BREATHING: 0
CONSOLABILITY: 0
TOTALSCORE: 0
FACIALEXPRESSION: 0
CONSOLABILITY: 0
BODYLANGUAGE: 0
NEGVOCALIZATION: 0
NEGVOCALIZATION: 0
BREATHING: 0
CONSOLABILITY: 0
BODYLANGUAGE: 0
TOTALSCORE: 0
BREATHING: 0
NEGVOCALIZATION: 0
BODYLANGUAGE: 0
CONSOLABILITY: 0
BREATHING: 0
CONSOLABILITY: 0
TOTALSCORE: 0
FACIALEXPRESSION: 0
FACIALEXPRESSION: 0
BODYLANGUAGE: 0
CONSOLABILITY: 0
TOTALSCORE: 0
BREATHING: 0
BODYLANGUAGE: 0
FACIALEXPRESSION: 0
FACIALEXPRESSION: 0
TOTALSCORE: 0
CONSOLABILITY: 0
BREATHING: 0
FACIALEXPRESSION: 0
CONSOLABILITY: 0
BODYLANGUAGE: 0
NEGVOCALIZATION: 0
CONSOLABILITY: 0
FACIALEXPRESSION: 0
NEGVOCALIZATION: 0
NEGVOCALIZATION: 0
BODYLANGUAGE: 0
BREATHING: 0
BODYLANGUAGE: 0
NEGVOCALIZATION: 0
NEGVOCALIZATION: 0
BREATHING: 0
FACIALEXPRESSION: 0
BREATHING: 0
CONSOLABILITY: 0
NEGVOCALIZATION: 0
TOTALSCORE: 0
CONSOLABILITY: 0
TOTALSCORE: 0
CONSOLABILITY: 0
FACIALEXPRESSION: 0
BREATHING: 0
BREATHING: 0
TOTALSCORE: 0
FACIALEXPRESSION: 0
BREATHING: 0
NEGVOCALIZATION: 0
BODYLANGUAGE: 0
TOTALSCORE: 0
FACIALEXPRESSION: 0
NEGVOCALIZATION: 0
TOTALSCORE: 0
FACIALEXPRESSION: 0
BODYLANGUAGE: 0
NEGVOCALIZATION: 0
BODYLANGUAGE: 0

## 2021-10-17 ASSESSMENT — PAIN SCALES - GENERAL
PAINLEVEL_OUTOF10: 0

## 2021-10-17 NOTE — PROGRESS NOTES
Pt's Son Carlitos Holland,  updated about pt's status and plans of care. All questions answered.  Electronically signed by Malina Gregg RN on 10/17/2021 at 7:13 AM

## 2021-10-17 NOTE — PROGRESS NOTES
Pt is ESRD pt . Total urine output on this shift is 150 ml. Bladder scan volume of 229 ml. Will continue to monitor.

## 2021-10-17 NOTE — PROGRESS NOTES
General Surgery   Daily Progress Note  Patient: Katty Watkins      CC: ischemic bowel, s/p extended Anna's at OSH    SUBJECTIVE:   No issues overnight. Afebrile and HDS. The patient is non-verbal and is unable to give any history. ROS:   A 14 point review of systems was conducted, significant findings as noted above. All other systems negative. OBJECTIVE:    PHYSICAL EXAM:    Vitals:    10/16/21 1803 10/16/21 2035 10/16/21 2330 10/17/21 0600   BP:  128/75 115/61    Pulse: 90 86 78    Resp:  22 22 20   Temp:  98.9 °F (37.2 °C) 98.7 °F (37.1 °C) 97.8 °F (36.6 °C)   TempSrc:  Oral Oral Oral   SpO2: 98% 99% 98%    Weight:       Height:         General appearance: resting in bed, non-verbal  HENT: trachea midline, no JVD, no LAD  Eyes: PERRLA, no scleral icterus  Chest/Lungs: Normal effort on 2L NC   Cardiovascular: RRR, brisk capillary refill distally  Abdomen: soft, non-tender, non-distended, ostomy pink and viable with loose brown stool. HAYLEY completed with loose brown discharge, PEG tube in place  Skin: warm and dry  Extremities: no edema , no cyanosis    LABS:   Recent Labs     10/16/21  0529 10/16/21  1103 10/17/21  0312 10/17/21  0430   WBC 8.6  --   --  11.2*   HGB 9.9*   < > 9.4* 9.4*   HCT 29.3*   < > 27.8* 27.7*   MCV 95.0  --   --  94.0     --   --  182    < > = values in this interval not displayed. Recent Labs     10/16/21  0529 10/17/21  0430   * 126*   K 3.7 4.4   CL 89* 90*   CO2 25 22   PHOS 3.6 4.2   BUN 55* 63*   CREATININE 3.0* 2.6*        Recent Labs     10/15/21  0947   AST 35   ALT 22   BILITOT <0.2   ALKPHOS 183*      No results for input(s): LIPASE, AMYLASE in the last 72 hours.      Recent Labs     10/15/21  0947 10/15/21  1955   PROT 5.8*  --    INR  --  0.95   APTT  --  21.5*        Recent Labs     10/15/21  0947 10/15/21  1152   TROPONINI 0.81* 0.85*         ASSESSMENT & PLAN:   This is a 80 y.o. female with an extensive medical history who presented with hypotension and found to be anemic. CT (10/15) showed some mild strnding around Anna's stump consistent with post-surgical changes. +UTI    - GI consulted to r/o acute GIB given anemia on presentation   - WBC remains within normal limits, follow up blood cx  - Patient responded appropriately from pRBCs, Hgb 9.4 from 9.4  - Abdominal exam remains the same, no indication for acute surgical intervention  - medical management per primary    Clling Mata MD, PGY-1  10/17/21 7:52 AM  Pager: [phone]    I have seen, examined, and reviewed the patients chart. I agree with the residents assessment and have made appropriate changes.     Cari Yoo

## 2021-10-17 NOTE — PROGRESS NOTES
Clinical Pharmacy Progress Note    Vancomycin - Management by Pharmacy    Consult Date(s): 10/15  Consulting Provider(s): Dr. Tiffany Griffith / Plan  Sepsis - Vancomycin   Concurrent Antimicrobials: Meropenem (#2) + fluconazole (#2)   Day of Vanc Therapy: 3   Current Dosing Method: Intermittent   Therapeutic Goal: 15-20 mg/L   Current Dose / Frequency:intermittent   Plan / Rationale:   o Patient with ESRD on HD, MWF. Will dose intermittently. o Received 1250 mg LD in ED 10/15 @ 11:19.   o Random level yesterday= 19.9 mcg/mL. Plan is to continue HD maintenance on MWF, will not give dose today since not receiving HD.   o Ordered level for tomorrow AM prior to HD.  Will continue to monitor clinical condition and make adjustments to regimen as appropriate. Thanks for consulting pharmacy! Please call with questions [address] [facility]. [facility] Resident   10/17/2021 9:02 AM      Interval update: Patient makes some urine, 0.1mL/kg/hour. No indication for acute surgical intervention. Cleared by cardiology, EGD planned for tomorrow. Subjective/Objective: Ms. Corrie Chamorro is a 80 y.o. female with a PMHx significant for ESRD on HD MWF, CAD s/p CABG, DMII, HLD, CVA w/ right-sided deficits, HTN, admission for septic shock in 7/2021 secondary to ischemic bowel, nstemi type 2 due to demand ischemia, dysphagia, anemia, admitted for acute encephalopathy 2/2 sepsis. Pharmacy has been consulted to dose vanomcyin. Height:   Ht Readings from Last 1 Encounters:   10/15/21 5' 3\" (1.6 m)     Weight:   Wt Readings from Last 1 Encounters:   10/15/21 114 lb (51.7 kg)       Level(s) / Doses:    Date Time Dose Level / Type of Level Interpretation   10/16 05:29 1250mg LD x1 Random = 19.9 mcg/mL Will not dose today   10/18  1250mg LD x1 Random = ordered    Note: Serum levels collected for AUC-based dosing may be high if collected in close proximity to the dose administered.  This is not necessarily an indicator of toxicity.     Cultures & Sensitivities:    Date Site Micro Susceptibility / Result   10/15 Urine Candida glabrata No further work up   10/15 Blood NGTD    10/16 MRSA pcr Pending      Labs / Ancillary Data:    ESRD on HD MWF    Recent Labs     10/15/21  0947 10/16/21  0529 10/17/21  0430   CREATININE 2.7* 3.0* 2.6*   BUN 52* 55* 63*   WBC 9.1 8.6 11.2*       Additional Lab Values / Findings of Note:

## 2021-10-17 NOTE — PROGRESS NOTES
Office: [phone]       Fax: [phone]      Nephrology Daily Note        Patient's Name: César Mead  Date of Visit: 10/17/2021    Reason for Consult:  ESRD  Subjective:   Admit Date: 10/15/2021        INTERVAL HISTORY    Feels same  Shortness of breath: No     No nausea or vomiting     DIET Diet NPO Exceptions are: Sips of Water with Meds  Diet NPO Exceptions are: Sips of Water with Meds  Medications:   Scheduled Meds:   atorvastatin  80 mg Oral Nightly    metoprolol tartrate  12.5 mg Oral BID    fluconazole  200 mg PEG Tube Daily    sodium chloride flush  5-40 mL IntraVENous 2 times per day    insulin lispro  0-6 Units SubCUTAneous Q6H    meropenem  500 mg IntraVENous Q24H    midodrine  10 mg PEG Tube TID    vancomycin (VANCOCIN) intermittent dosing (placeholder)   Other See Admin Instructions    influenza virus vaccine  0.5 mL IntraMUSCular Prior to discharge     Continuous Infusions:   sodium chloride      sodium chloride 25 mL (10/16/21 1301)    dextrose      sodium chloride         Labs:  CBC:   Recent Labs     10/15/21  0947 10/15/21  1455 10/16/21  0529 10/16/21  1103 10/16/21  2139 10/17/21  0312 10/17/21  0430   WBC 9.1  --  8.6  --   --   --  11.2*   HGB 5.3*   < > 9.9*   < > 9.4* 9.4* 9.4*     --  135  --   --   --  182    < > = values in this interval not displayed.      Ca/Mg/Phos:   Recent Labs     10/15/21  0947 10/16/21  0529 10/17/21  0430   CALCIUM 8.9 8.4 8.4   MG 1.90 2.00 2.00   PHOS  --  3.6 4.2     UA:  Recent Labs     10/15/21  0947   COLORU Yellow   CLARITYU TURBID*   GLUCOSEU Negative   BILIRUBINUR Negative   KETUA Negative   SPECGRAV 1.020   BLOODU MODERATE*   PHUR 6.0   PROTEINU 100*   UROBILINOGEN 0.2   NITRU Negative   LEUKOCYTESUR LARGE*   LABMICR YES   URINETYPE NotGiven      Urine Microscopic: * 127* 126*   K 3.5 3.7 4.4   CO2 26 25 22   MG 1.90 2.00 2.00         2. HTN  -low    BP: 113/62    3. Anemia    4. CAD    Plan:     -maintenance HD MWF with UF as tolerated  -anemia management  -MBD management  -midodrine  -minimize IVF  -Antimicrobials per primary team   -dose meds to GFR<10      Thank you for allowing us to participate in care of Anand Mohan . We will continue to follow. Feel free to contact me with any questions.       Bryant Crane MD  10/17/2021    [facility] [address] [place] [address] S  Office : [phone]  Fax :[phone]

## 2021-10-17 NOTE — PLAN OF CARE
Problem: Fluid Volume:  Goal: Will show no signs or symptoms of fluid imbalance  Description: Will show no signs or symptoms of fluid imbalance  10/17/2021 0230 by Ozella Cogan, RN  Outcome: Ongoing   Pt with no indication  for fluid imbalance at this time. Will continue to monitor. Problem: Cardiac:  Goal: Hemodynamic stability will improve  Description: Hemodynamic stability will improve  10/17/2021 0230 by Ozella Cogan, RN  Outcome: Ongoing     Pt remains hemodynamically stable  with no signs of decrease cardiac output. Will continue to monitor. Problem: OXYGENATION/RESPIRATORY FUNCTION  Goal: Patient will maintain patent airway  10/17/2021 0230 by Ozella Cogan, RN  Outcome: Ongoing     Pt requiring one liter of oxygen for comfort. She is sleeping comfortably in bed. Will continue to monitor.

## 2021-10-17 NOTE — PROGRESS NOTES
Hospitalist Progress Note      PCP: AYDEN Weinberg    Date of Admission: 10/15/2021    Chief Complaint: Altered mental status, hypotension    Subjective:     Alert. Does not communicate any history. History Of Present Illness:       Cristina Simmons is a 80 y.o. female with past medical history as below, including ESRD on HD, CAD s/p CABG, DMII, HLD, CVA w/ right-sided deficits, HTN, admission for septic shock in 7/2021 secondary to ischemic bowel, nstemi type 2 due to demand ischemia, dysphagia, anemia, reported melena earlier this year per chart review. She was noted to be alert to self with some underlying confusion when she was discharged in July and was not able to communicate any history on last admission to [facility] due to reported baseline mental status.      The patient cannot give any history.      Myself and other staff have tried to reach nursing home multiple times however have not been able to obtain further recent history.       In the ED she was hypotensive, anemic to 5, lactic acid > 4, UA with signs of infection, and during examination also appeared to have some purulent drainage from the rectum. She received 750 ml LR, 1 unit of pRBCs, vancomycin and meropenem.     Medications:  Reviewed    Infusion Medications    sodium chloride      sodium chloride 25 mL (10/16/21 1301)    dextrose      sodium chloride       Scheduled Medications    atorvastatin  80 mg Oral Nightly    metoprolol tartrate  12.5 mg Oral BID    fluconazole  200 mg PEG Tube Daily    sodium chloride flush  5-40 mL IntraVENous 2 times per day    insulin lispro  0-6 Units SubCUTAneous Q6H    meropenem  500 mg IntraVENous Q24H    midodrine  10 mg PEG Tube TID    vancomycin (VANCOCIN) intermittent dosing (placeholder)   Other See Admin Instructions    influenza virus vaccine  0.5 mL IntraMUSCular Prior to discharge     PRN Meds: sodium chloride, sodium chloride flush, sodium chloride, ondansetron **OR** ondansetron, polyethylene glycol, acetaminophen **OR** acetaminophen, perflutren lipid microspheres, glucose, glucagon (rDNA), dextrose, dextrose, sodium chloride      Intake/Output Summary (Last 24 hours) at 10/17/2021 0825  Last data filed at 10/17/2021 0606  Gross per 24 hour   Intake 180 ml   Output 205 ml   Net -25 ml       Exam:    /62   Pulse 84   Temp 98.6 °F (37 °C) (Oral)   Resp 20   Ht 5' 3\" (1.6 m)   Wt 114 lb (51.7 kg)   LMP  (LMP Unknown)   SpO2 100%   BMI 20.19 kg/m²     General appearance: No apparent distress, appears stated age and cooperative. HEENT: Pupils equal, round, and reactive to light. Conjunctivae/corneas clear. Neck: Supple, with full range of motion. No jugular venous distention. Trachea midline. Respiratory:  Normal respiratory effort. Clear to auscultation, bilaterally without Rales/Wheezes/Rhonchi. Cardiovascular: Regular rate and rhythm with normal S1/S2 without murmurs, rubs or gallops. Abdomen: Soft, non-tender, non-distended with normal bowel sounds. Musculoskelatal: No clubbing, cyanosis or edema bilaterally. Skin: Skin color, texture, turgor normal.  No rashes or lesions. Neurologic:  Cranial nerves: II-XII intact, grossly non-focal.  Psychiatric: Alert and oriented, thought content appropriate, normal insight    Labs:   Recent Labs     10/15/21  0947 10/15/21  1455 10/16/21  0529 10/16/21  1103 10/16/21  2139 10/17/21  0312 10/17/21  0430   WBC 9.1  --  8.6  --   --   --  11.2*   HGB 5.3*   < > 9.9*   < > 9.4* 9.4* 9.4*   HCT 15.6*   < > 29.3*   < > 27.3* 27.8* 27.7*     --  135  --   --   --  182    < > = values in this interval not displayed.      Recent Labs     10/15/21  0947 10/16/21  0529 10/17/21  0430   * 127* 126*   K 3.5 3.7 4.4   CL 82* 89* 90*   CO2 26 25 22   BUN 52* 55* 63*   CREATININE 2.7* 3.0* 2.6*   CALCIUM 8.9 8.4 8.4   PHOS  --  3.6 4.2     Recent Labs     10/15/21  0947   AST 35   ALT 22   BILITOT <0.2   ALKPHOS 183* Recent Labs     10/15/21  1955   INR 0.95     Recent Labs     10/15/21  0947 10/15/21  1152   TROPONINI 0.81* 0.85*       Studies:  XR CHEST PORTABLE   Final Result      New airspace disease in the right lung. Correlate for an acute infectious process. CT ABDOMEN PELVIS WO CONTRAST Additional Contrast? None   Final Result      Postsurgical changes are present in the abdomen and pelvis as detailed above. No convincing evidence of an acute GI tract abnormality. Indeterminate hypodense lesion in the right hepatic lobe. Recommend further evaluation with liver MRI with and without contrast.      Bilateral pleural effusions, right greater than left. Right lower lobe consolidation, presumably passive atelectasis. CT Head WO Contrast   Final Result      No acute intracranial hemorrhage or mass effect. Old insult in the brain as detailed above. Cerebral atrophy.              Assessment/Plan:    Active Hospital Problems    Diagnosis Date Noted    Altered mental status [R41.82]     Sepsis ([facility] [address] [A41.9] 10/15/2021     Hypotension likely d/t sepsis (Temp 102.4, hypotension, purulent drainage noted from rectum in ED)  Acute encephalopathy in the setting of prior CVA, dementia, per chart review oriented to self at baseline  Hyponatremia  ESRD on HD  UTI  Acute on chronic anemia  History of septic shock for ischemic colitis and NSTEMI type II in 7/2021  Hx of CVA with R-sided deficits  Dysphagia with PEG tube at baseline  Troponin elevation   CAD s/p CABG 2000  B/l pleural effusions  Iodine allergy  DMII     PLAN:     Acute encephalopathy in the setting of prior CVA, dementia, per chart review oriented to self at baseline  CT head no acute abnormalities  Multiple etiology including sepsis, hyponatremia  Treatment as below     Severe Sepsis, lactic acid > 4  Hx of septic shock with ischemic colitis, purulence noted for rectum per ED providers  Also possible UTI vs PNA, has central access  Blood and urine cultures ordered  On vancomycin and meropenem pending completed blood culture results  MRSA swab  General surgery consulted for possible colitis? Candida UTI  Started Fluconazole, continue    Hypotension  Treatment for sepsis with antibiotics and IV fluids approx 2 L  Patient has also been on midodrine, restarted     Hyponatremia  ESRD on HD  Nephrology consulted for HD  Receiving IV fluids     Acute on chronic anemia, severe  Hgb 5.3 in ED  Per chart review there was a note with concern for black stools though she is on iron therapy. Stool is brown currently. .  Iron studies  Protonix IV BID for now  FOBT  GI consultation, recs appreciated  S/p 2 units pRBCs  EGD planned     Acute hypoxemic respiratory failure  B/l pleural effusions on imaging, atelectasis vs PNA  Antibiotics as above  Weaning oxygen     Troponin elevation likely due to demand ischemia in the setting of sepsis and ESRD  CAD s/p CABG 2000  Patient has ST changes that appear similar but more pronounced since her last EKG  Hold anticoagulation due to acute anemia with Hgb of 5  Transfusion goal Hgb >8  Cardiology consulted  Ischemic evaluation will be needed outpatient  Lopressor low dose as BP allows  Statin restarted     DMII  Low dose sliding scale on NPO algorithm     Dysphagia with PEG tube at baseline     Pharmacy med/rec     DVT Prophylaxis: SCDs only for now  Diet: Diet NPO Exceptions are: Sips of Water with Meds  Diet NPO Exceptions are: Sips of Water with Meds  Code Status: Limited    PT/OT Eval Status: from long term care    Dispo - Inpatient    Erma Street DO

## 2021-10-17 NOTE — PROGRESS NOTES
GI Progress Note      Elder Chen is a 80 y.o. female patient. 1. Altered mental status, unspecified altered mental status type    2. Anemia, unspecified type    3. Acute cystitis without hematuria    4. ESRD needing dialysis ([facility]        Admit Date: 10/15/2021    Subjective:       No pain.  No bleeding      ROS:  As per above    Scheduled Meds:   atorvastatin  80 mg Oral Nightly    metoprolol tartrate  12.5 mg Oral BID    fluconazole  200 mg PEG Tube Daily    sodium chloride flush  5-40 mL IntraVENous 2 times per day    insulin lispro  0-6 Units SubCUTAneous Q6H    meropenem  500 mg IntraVENous Q24H    midodrine  10 mg PEG Tube TID    vancomycin (VANCOCIN) intermittent dosing (placeholder)   Other See Admin Instructions    influenza virus vaccine  0.5 mL IntraMUSCular Prior to discharge       Continuous Infusions:   sodium chloride      sodium chloride 25 mL (10/16/21 1301)    dextrose      sodium chloride         PRN Meds:  sodium chloride, sodium chloride flush, sodium chloride, ondansetron **OR** ondansetron, polyethylene glycol, acetaminophen **OR** acetaminophen, perflutren lipid microspheres, glucose, glucagon (rDNA), dextrose, dextrose, sodium chloride      Objective:       Patient Vitals for the past 24 hrs:   BP Temp Temp src Pulse Resp SpO2   10/17/21 0758 113/62 98.6 °F (37 °C) Oral 84 20 100 %   10/17/21 0600 -- 97.8 °F (36.6 °C) Oral -- 20 --   10/16/21 2330 115/61 98.7 °F (37.1 °C) Oral 78 22 98 %   10/16/21 2035 128/75 98.9 °F (37.2 °C) Oral 86 22 99 %   10/16/21 1803 -- -- -- 90 -- 98 %   10/16/21 1617 117/75 99.9 °F (37.7 °C) Axillary 88 20 100 %   10/16/21 1413 -- -- -- 86 -- 98 %   10/16/21 1204 (!) 120/58 98.9 °F (37.2 °C) Axillary 83 22 100 %   10/16/21 1015 (!) 98/58 -- -- 85 -- 98 %   10/16/21 0904 -- -- -- -- -- 100 %       Exam:  VITALS:  /62   Pulse 84   Temp 98.6 °F (37 °C) (Oral)   Resp 20   Ht 5' 3\" (1.6 m)   Wt 114 lb (51.7 kg)   LMP  (LMP Unknown) SpO2 100%   BMI 20.19 kg/m²   TEMPERATURE:  Current - Temp: 98.6 °F (37 °C); Max - Temp  Av.8 °F (37.1 °C)  Min: 97.8 °F (36.6 °C)  Max: 99.9 °F (37.7 °C)      General appearance: alert, appears stated age, cooperative and no distress  Head: Normocephalic, without obvious abnormality, atraumatic  Neck: supple, symmetrical, trachea midline and thyroid not enlarged, symmetric, no tenderness/mass/nodules  CVS:  RRR, Nl s1s2  Lungs CTA Bilaterally, normal effort  Abdomen: soft, non-tender; bowel sounds normal; no masses,  no organomegaly PEG without blood; ostomy with brown stool  Does not speak  Extremities: No edema. Recent Labs     10/15/21  0947 10/15/21  1455 10/16/21  0529 10/16/21  1103 10/16/21  2139 10/17/21  0312 10/17/21  0430   WBC 9.1  --  8.6  --   --   --  11.2*   HGB 5.3*   < > 9.9*   < > 9.4* 9.4* 9.4*   HCT 15.6*   < > 29.3*   < > 27.3* 27.8* 27.7*   .3*  --  95.0  --   --   --  94.0     --  135  --   --   --  182    < > = values in this interval not displayed. Recent Labs     10/15/21  0947 10/16/21  0529 10/17/21  0430   * 127* 126*   K 3.5 3.7 4.4   CL 82* 89* 90*   CO2 26 25 22   PHOS  --  3.6 4.2   BUN 52* 55* 63*   CREATININE 2.7* 3.0* 2.6*     Recent Labs     10/15/21  0947   AST 35   ALT 22   BILITOT <0.2   ALKPHOS 183*     No results for input(s): LIPASE, AMYLASE in the last 72 hours. Recent Labs     10/15/21  0947 10/15/21  195   PROT 5.8*  --    INR  --  0.95     No results for input(s): PTT in the last 72 hours. No results for input(s): OCCULTBLD in the last 72 hours.         Assessment:       anemia    Recommendations:       No overt bleeding  EGD tomorrow; cleared by cardiology    Estela Rosado MD  10/17/2021  8:24 AM

## 2021-10-17 NOTE — PROGRESS NOTES
Electrophysiology - PROGRESS NOTE    Admit Date: 10/15/2021     Chief Complaint: Elevated troponin     Interval History:   Patient seen and examined and notes reviewed. Patient is being followed for elevated troponin. Patient presented to the hospital from nursing home with AMS. Hemoglobin 5.3, blood transfusion given. Troponin 0.81, 0.85, EKG with some ST-T wave abnormalities. Patient hypotensive, lactic acid 4.3, positive UA, placed on antibiotics, IV fluids. Telemetry shows normal sinus rhythm. Patient with minimal verbalization during exam. Difficult to obtain review of symptoms.     In: 60 [NG/GT:60]  Out: 205    Wt Readings from Last 2 Encounters:   10/15/21 114 lb (51.7 kg)   10/07/21 113 lb 5.1 oz (51.4 kg)         Data:   Scheduled Meds:   Scheduled Meds:   atorvastatin  80 mg Oral Nightly    metoprolol tartrate  12.5 mg Oral BID    fluconazole  200 mg PEG Tube Daily    sodium chloride flush  5-40 mL IntraVENous 2 times per day    insulin lispro  0-6 Units SubCUTAneous Q6H    meropenem  500 mg IntraVENous Q24H    midodrine  10 mg PEG Tube TID    vancomycin (VANCOCIN) intermittent dosing (placeholder)   Other See Admin Instructions    influenza virus vaccine  0.5 mL IntraMUSCular Prior to discharge     Continuous Infusions:   sodium chloride      sodium chloride 25 mL (10/16/21 1301)    dextrose      sodium chloride       PRN Meds:.sodium chloride, sodium chloride flush, sodium chloride, ondansetron **OR** ondansetron, polyethylene glycol, acetaminophen **OR** acetaminophen, perflutren lipid microspheres, glucose, glucagon (rDNA), dextrose, dextrose, sodium chloride  Continuous Infusions:   sodium chloride      sodium chloride 25 mL (10/16/21 1301)    dextrose      sodium chloride         Intake/Output Summary (Last 24 hours) at 10/17/2021 1125  Last data filed at 10/17/2021 0606  Gross per 24 hour   Intake 120 ml   Output 205 ml   Net -85 ml       CBC:   Lab Results   Component Value Date    WBC 11.2 10/17/2021    HGB 9.4 10/17/2021     10/17/2021     BMP:  Lab Results   Component Value Date     10/17/2021    K 4.4 10/17/2021    CL 90 10/17/2021    CO2 22 10/17/2021    BUN 63 10/17/2021    CREATININE 2.6 10/17/2021    GLUCOSE 105 10/17/2021     INR:   Lab Results   Component Value Date    INR 0.95 10/15/2021    INR 1.03 10/05/2021    INR 0.96 10/10/2019        CARDIAC LABS  ENZYMES:  Recent Labs     10/15/21  0947 10/15/21  1152   TROPONINI 0.81* 0.85*     FASTING LIPID PANEL:  Lab Results   Component Value Date    HDL 31 10/15/2019    LDLCALC 145 10/15/2019    TRIG 125 10/15/2019     LIVER PROFILE:  Lab Results   Component Value Date    AST 35 10/15/2021    AST 19 09/10/2021    ALT 22 10/15/2021    ALT 10 09/10/2021       -----------------------------------------------------------------  Telemetry: Personally reviewed NSR, rates controlled    Objective:   Vitals: /62   Pulse 84   Temp 98.6 °F (37 °C) (Oral)   Resp 20   Ht 5' 3\" (1.6 m)   Wt 114 lb (51.7 kg)   LMP  (LMP Unknown)   SpO2 100%   BMI 20.19 kg/m²   General appearance: alert, appears stated age and cooperative, No acute distress   Eyes: Conjunctiva and pupils normal and reactive  Skin: Skin color, texture, turgor normal. No rashes or ecchymosis.   Neck: no JVD, supple, symmetrical, trachea midline   Lungs: , no accessory muscle use, no respiratory distress  Heart: NSR, rates controlled  Abdomen: soft, non-tender; bowel sounds normal  Extremities: No edema, DP +  Psychiatric: normal insight and affect    Patient Active Problem List:     Peripheral vascular disease (HCC)     Shortness of breath     Other chest pain     Diabetes mellitus (HCC)     Carotid stenosis     CAD (coronary artery disease)     Hyperlipidemia     Vasovagal syncope     Pure hypercholesterolemia     Status post left heart catheterization     Anemia     NSTEMI (non-ST elevated myocardial infarction) ([facility] [address]     CAD S/P percutaneous coronary angioplasty     Essential hypertension     Unstable angina (HCC)     Fatigue     Complication associated with dialysis catheter     ESRD (end stage renal disease) ([facility] [address]     Anemia in ESRD (end-stage renal disease) ([facility] [address]     Electrolyte imbalance     Sepsis ([facility] [address]     Altered mental status        Assessment & Plan:    1. Elevated troponin  2. CAD s/p CABG  3. HTN  4.  Sepsis    Cardiac Hx : Yessica Massey is a 80 y.o. woman w/ PMH HLD, DM, CAD, s/p CABG, syncope, ESRD who presented with AMS from nursing home, troponins elevated    Elevated troponin  -0.81, 0.85  -Likely due to demand ischemia per Dr. Irving Romero note  -On Lopressor 12.5 mg twice daily  -On Lipitor 80 mg daily, ASA on hold due to severe anemia/EGD tomorrow  - Plan for outpatient ischemic eval    HTN  -BP improving  -On midodrine 10 mg 3 times daily    Electronically signed by PATSY Moon - CNP on 10/17/21 at 11:25 AM EDT    [address]

## 2021-10-18 ENCOUNTER — ANESTHESIA EVENT (OUTPATIENT)
Dept: ENDOSCOPY | Age: 71
DRG: 871 | End: 2021-10-18
Payer: MEDICARE

## 2021-10-18 ENCOUNTER — ANESTHESIA (OUTPATIENT)
Dept: ENDOSCOPY | Age: 71
DRG: 871 | End: 2021-10-18
Payer: MEDICARE

## 2021-10-18 VITALS — OXYGEN SATURATION: 100 % | DIASTOLIC BLOOD PRESSURE: 56 MMHG | SYSTOLIC BLOOD PRESSURE: 123 MMHG

## 2021-10-18 LAB
ANION GAP SERPL CALCULATED.3IONS-SCNC: 16 MMOL/L (ref 3–16)
BANDED NEUTROPHILS RELATIVE PERCENT: 1 % (ref 0–7)
BASOPHILS ABSOLUTE: 0 K/UL (ref 0–0.2)
BASOPHILS RELATIVE PERCENT: 0 %
BUN BLDV-MCNC: 68 MG/DL (ref 7–20)
CALCIUM SERPL-MCNC: 8.4 MG/DL (ref 8.3–10.6)
CHLORIDE BLD-SCNC: 91 MMOL/L (ref 99–110)
CO2: 23 MMOL/L (ref 21–32)
CREAT SERPL-MCNC: 2.1 MG/DL (ref 0.6–1.2)
EOSINOPHILS ABSOLUTE: 0.3 K/UL (ref 0–0.6)
EOSINOPHILS RELATIVE PERCENT: 3 %
GFR AFRICAN AMERICAN: 28
GFR NON-AFRICAN AMERICAN: 23
GLUCOSE BLD-MCNC: 117 MG/DL (ref 70–99)
GLUCOSE BLD-MCNC: 134 MG/DL (ref 70–99)
GLUCOSE BLD-MCNC: 139 MG/DL (ref 70–99)
GLUCOSE BLD-MCNC: 149 MG/DL (ref 70–99)
GLUCOSE BLD-MCNC: 193 MG/DL (ref 70–99)
GLUCOSE BLD-MCNC: 194 MG/DL (ref 70–99)
HCT VFR BLD CALC: 26.7 % (ref 36–48)
HCT VFR BLD CALC: 27.1 % (ref 36–48)
HEMOGLOBIN: 9 G/DL (ref 12–16)
HEMOGLOBIN: 9.2 G/DL (ref 12–16)
LYMPHOCYTES ABSOLUTE: 1.1 K/UL (ref 1–5.1)
LYMPHOCYTES RELATIVE PERCENT: 12 %
MCH RBC QN AUTO: 32.4 PG (ref 26–34)
MCHC RBC AUTO-ENTMCNC: 33.8 G/DL (ref 31–36)
MCV RBC AUTO: 95.9 FL (ref 80–100)
MONOCYTES ABSOLUTE: 1.9 K/UL (ref 0–1.3)
MONOCYTES RELATIVE PERCENT: 20 %
MYELOCYTE PERCENT: 2 %
NEUTROPHILS ABSOLUTE: 6 K/UL (ref 1.7–7.7)
NEUTROPHILS RELATIVE PERCENT: 62 %
PDW BLD-RTO: 20.2 % (ref 12.4–15.4)
PERFORMED ON: ABNORMAL
PLATELET # BLD: 196 K/UL (ref 135–450)
PMV BLD AUTO: 8.8 FL (ref 5–10.5)
POTASSIUM REFLEX MAGNESIUM: 4.4 MMOL/L (ref 3.5–5.1)
PROCALCITONIN: 0.85 NG/ML (ref 0–0.15)
RBC # BLD: 2.79 M/UL (ref 4–5.2)
SODIUM BLD-SCNC: 130 MMOL/L (ref 136–145)
VANCOMYCIN RANDOM: 13 UG/ML
WBC # BLD: 9.3 K/UL (ref 4–11)

## 2021-10-18 PROCEDURE — 85014 HEMATOCRIT: CPT

## 2021-10-18 PROCEDURE — 7100000010 HC PHASE II RECOVERY - FIRST 15 MIN: Performed by: INTERNAL MEDICINE

## 2021-10-18 PROCEDURE — 99231 SBSQ HOSP IP/OBS SF/LOW 25: CPT | Performed by: SURGERY

## 2021-10-18 PROCEDURE — 80048 BASIC METABOLIC PNL TOTAL CA: CPT

## 2021-10-18 PROCEDURE — 2580000003 HC RX 258: Performed by: INTERNAL MEDICINE

## 2021-10-18 PROCEDURE — 85025 COMPLETE CBC W/AUTO DIFF WBC: CPT

## 2021-10-18 PROCEDURE — 0DD98ZX EXTRACTION OF DUODENUM, VIA NATURAL OR ARTIFICIAL OPENING ENDOSCOPIC, DIAGNOSTIC: ICD-10-PCS | Performed by: INTERNAL MEDICINE

## 2021-10-18 PROCEDURE — C9113 INJ PANTOPRAZOLE SODIUM, VIA: HCPCS | Performed by: INTERNAL MEDICINE

## 2021-10-18 PROCEDURE — 90935 HEMODIALYSIS ONE EVALUATION: CPT

## 2021-10-18 PROCEDURE — 6370000000 HC RX 637 (ALT 250 FOR IP): Performed by: INTERNAL MEDICINE

## 2021-10-18 PROCEDURE — 99221 1ST HOSP IP/OBS SF/LOW 40: CPT | Performed by: NURSE PRACTITIONER

## 2021-10-18 PROCEDURE — 36415 COLL VENOUS BLD VENIPUNCTURE: CPT

## 2021-10-18 PROCEDURE — 87040 BLOOD CULTURE FOR BACTERIA: CPT

## 2021-10-18 PROCEDURE — 0DD68ZX EXTRACTION OF STOMACH, VIA NATURAL OR ARTIFICIAL OPENING ENDOSCOPIC, DIAGNOSTIC: ICD-10-PCS | Performed by: INTERNAL MEDICINE

## 2021-10-18 PROCEDURE — 6360000002 HC RX W HCPCS: Performed by: INTERNAL MEDICINE

## 2021-10-18 PROCEDURE — 6360000002 HC RX W HCPCS: Performed by: NURSE ANESTHETIST, CERTIFIED REGISTERED

## 2021-10-18 PROCEDURE — 7100000011 HC PHASE II RECOVERY - ADDTL 15 MIN: Performed by: INTERNAL MEDICINE

## 2021-10-18 PROCEDURE — 94640 AIRWAY INHALATION TREATMENT: CPT

## 2021-10-18 PROCEDURE — 90935 HEMODIALYSIS ONE EVALUATION: CPT | Performed by: INTERNAL MEDICINE

## 2021-10-18 PROCEDURE — 85018 HEMOGLOBIN: CPT

## 2021-10-18 PROCEDURE — 88305 TISSUE EXAM BY PATHOLOGIST: CPT

## 2021-10-18 PROCEDURE — 88342 IMHCHEM/IMCYTCHM 1ST ANTB: CPT

## 2021-10-18 PROCEDURE — 2060000000 HC ICU INTERMEDIATE R&B

## 2021-10-18 PROCEDURE — 84145 PROCALCITONIN (PCT): CPT

## 2021-10-18 PROCEDURE — 3609012400 HC EGD TRANSORAL BIOPSY SINGLE/MULTIPLE: Performed by: INTERNAL MEDICINE

## 2021-10-18 PROCEDURE — 80202 ASSAY OF VANCOMYCIN: CPT

## 2021-10-18 PROCEDURE — 2580000003 HC RX 258: Performed by: NURSE ANESTHETIST, CERTIFIED REGISTERED

## 2021-10-18 PROCEDURE — 5A1D70Z PERFORMANCE OF URINARY FILTRATION, INTERMITTENT, LESS THAN 6 HOURS PER DAY: ICD-10-PCS | Performed by: INTERNAL MEDICINE

## 2021-10-18 PROCEDURE — 2709999900 HC NON-CHARGEABLE SUPPLY: Performed by: INTERNAL MEDICINE

## 2021-10-18 PROCEDURE — 3700000000 HC ANESTHESIA ATTENDED CARE: Performed by: INTERNAL MEDICINE

## 2021-10-18 PROCEDURE — 3700000001 HC ADD 15 MINUTES (ANESTHESIA): Performed by: INTERNAL MEDICINE

## 2021-10-18 RX ORDER — ALBUTEROL SULFATE 2.5 MG/3ML
2.5 SOLUTION RESPIRATORY (INHALATION) EVERY 6 HOURS PRN
Status: DISCONTINUED | OUTPATIENT
Start: 2021-10-18 | End: 2021-10-21 | Stop reason: HOSPADM

## 2021-10-18 RX ORDER — PROPOFOL 10 MG/ML
INJECTION, EMULSION INTRAVENOUS PRN
Status: DISCONTINUED | OUTPATIENT
Start: 2021-10-18 | End: 2021-10-18 | Stop reason: SDUPTHER

## 2021-10-18 RX ORDER — ALBUTEROL SULFATE 2.5 MG/3ML
SOLUTION RESPIRATORY (INHALATION) PRN
Status: DISCONTINUED | OUTPATIENT
Start: 2021-10-18 | End: 2021-10-18 | Stop reason: SDUPTHER

## 2021-10-18 RX ORDER — VORICONAZOLE 200 MG/1
200 TABLET, FILM COATED ORAL EVERY 12 HOURS SCHEDULED
Status: DISCONTINUED | OUTPATIENT
Start: 2021-10-18 | End: 2021-10-21 | Stop reason: HOSPADM

## 2021-10-18 RX ORDER — SODIUM CHLORIDE 9 MG/ML
INJECTION, SOLUTION INTRAVENOUS CONTINUOUS PRN
Status: DISCONTINUED | OUTPATIENT
Start: 2021-10-18 | End: 2021-10-18 | Stop reason: SDUPTHER

## 2021-10-18 RX ORDER — SODIUM CHLORIDE 9 MG/ML
INJECTION, SOLUTION INTRAVENOUS ONCE
Status: COMPLETED | OUTPATIENT
Start: 2021-10-18 | End: 2021-10-18

## 2021-10-18 RX ORDER — PANTOPRAZOLE SODIUM 40 MG/10ML
40 INJECTION, POWDER, LYOPHILIZED, FOR SOLUTION INTRAVENOUS 2 TIMES DAILY
Status: DISCONTINUED | OUTPATIENT
Start: 2021-10-18 | End: 2021-10-21 | Stop reason: HOSPADM

## 2021-10-18 RX ORDER — HEPARIN SODIUM 1000 [USP'U]/ML
INJECTION, SOLUTION INTRAVENOUS; SUBCUTANEOUS
Status: DISPENSED
Start: 2021-10-18 | End: 2021-10-18

## 2021-10-18 RX ADMIN — VANCOMYCIN HYDROCHLORIDE 750 MG: 10 INJECTION, POWDER, LYOPHILIZED, FOR SOLUTION INTRAVENOUS at 16:50

## 2021-10-18 RX ADMIN — INSULIN LISPRO 1 UNITS: 100 INJECTION, SOLUTION INTRAVENOUS; SUBCUTANEOUS at 17:10

## 2021-10-18 RX ADMIN — SODIUM CHLORIDE, PRESERVATIVE FREE 5 ML: 5 INJECTION INTRAVENOUS at 21:57

## 2021-10-18 RX ADMIN — MEROPENEM 500 MG: 500 INJECTION, POWDER, FOR SOLUTION INTRAVENOUS at 19:21

## 2021-10-18 RX ADMIN — SODIUM CHLORIDE: 900 INJECTION, SOLUTION INTRAVENOUS at 15:00

## 2021-10-18 RX ADMIN — PROPOFOL 30 MG: 10 INJECTION, EMULSION INTRAVENOUS at 15:16

## 2021-10-18 RX ADMIN — ALBUTEROL SULFATE 2.5 MG: 2.5 SOLUTION RESPIRATORY (INHALATION) at 10:06

## 2021-10-18 RX ADMIN — PROPOFOL 30 MG: 10 INJECTION, EMULSION INTRAVENOUS at 15:14

## 2021-10-18 RX ADMIN — VORICONAZOLE 200 MG: 200 TABLET ORAL at 21:44

## 2021-10-18 RX ADMIN — INSULIN LISPRO 1 UNITS: 100 INJECTION, SOLUTION INTRAVENOUS; SUBCUTANEOUS at 21:58

## 2021-10-18 RX ADMIN — SODIUM CHLORIDE: 9 INJECTION, SOLUTION INTRAVENOUS at 16:51

## 2021-10-18 RX ADMIN — ALBUTEROL SULFATE 2.5 MG: 2.5 SOLUTION RESPIRATORY (INHALATION) at 12:50

## 2021-10-18 RX ADMIN — PANTOPRAZOLE SODIUM 40 MG: 40 INJECTION, POWDER, FOR SOLUTION INTRAVENOUS at 21:43

## 2021-10-18 RX ADMIN — METOPROLOL TARTRATE 12.5 MG: 25 TABLET, FILM COATED ORAL at 21:42

## 2021-10-18 RX ADMIN — ATORVASTATIN CALCIUM 80 MG: 80 TABLET, FILM COATED ORAL at 21:42

## 2021-10-18 RX ADMIN — MIDODRINE HYDROCHLORIDE 10 MG: 5 TABLET ORAL at 17:01

## 2021-10-18 ASSESSMENT — PAIN SCALES - PAIN ASSESSMENT IN ADVANCED DEMENTIA (PAINAD)
TOTALSCORE: 1
CONSOLABILITY: 0
BODYLANGUAGE: 0
BREATHING: 1
TOTALSCORE: 2
FACIALEXPRESSION: 0
NEGVOCALIZATION: 1
BODYLANGUAGE: 0
FACIALEXPRESSION: 0
NEGVOCALIZATION: 0
BREATHING: 1
NEGVOCALIZATION: 0
FACIALEXPRESSION: 0
BODYLANGUAGE: 0
BREATHING: 1
CONSOLABILITY: 0
TOTALSCORE: 1
CONSOLABILITY: 0

## 2021-10-18 ASSESSMENT — PULMONARY FUNCTION TESTS
PIF_VALUE: 0
PIF_VALUE: 1
PIF_VALUE: 0
PIF_VALUE: 1
PIF_VALUE: 0
PIF_VALUE: 1
PIF_VALUE: 0
PIF_VALUE: 1
PIF_VALUE: 0
PIF_VALUE: 1
PIF_VALUE: 0
PIF_VALUE: 1
PIF_VALUE: 1
PIF_VALUE: 0

## 2021-10-18 ASSESSMENT — PAIN SCALES - GENERAL
PAINLEVEL_OUTOF10: 0

## 2021-10-18 ASSESSMENT — PAIN SCALES - WONG BAKER
WONGBAKER_NUMERICALRESPONSE: 0
WONGBAKER_NUMERICALRESPONSE: 0

## 2021-10-18 NOTE — PROGRESS NOTES
Clinical Pharmacy Progress Note    Vancomycin - Management by Pharmacy    Consult Date(s): 10/15  Consulting Provider(s): Dr. Nate Yoo / Plan  Sepsis - Vancomycin   Concurrent Antimicrobials:   o Meropenem - Day #4  o Fluconazole - Day #3   Day of Vanc Therapy: #4   Current Dosing Method: Intermittent dosing d/t ESRD on HD   Current Dose / Frequency: Intermittent dosing   Plan / Rationale:   o Given ESRD on HD, will dose vancomycin based on intermittent doses. Normal HD schedule is MWF.    o Vancomycin level today = 13 mcg/mL. o Will give vancomycin 750 mg IV x1 with HD today.  Will continue to monitor clinical condition and make adjustments to regimen as appropriate. Please call with questions--  Pancho Starr, TerranceD, BCPS  Wireless: [id number]   10/18/2021 8:40 AM        Interval update: Plan for EGD today. Subjective/Objective: Ms. Reza Krishnamurthy is a 80 y.o. female with a PMHx significant for ESRD on HD MWF, CAD s/p CABG, DMII, HLD, CVA w/ right-sided deficits, HTN, admission for septic shock in 7/2021 secondary to ischemic bowel, nstemi type 2 due to demand ischemia, dysphagia, anemia, admitted for acute encephalopathy 2/2 sepsis. Pharmacy has been consulted to dose vanomcyin. Pertinent Antimicrobials:  Fluconazole 200mg per PEG daily (10/16-current)  Meropenem 1000 mg IV x1, 500 mg IV q24h (10/15-current)  Vancomycin - pharmacy to dose (10/15-current)   Intermittent dosing (10/15-current)   Date Dose Vanc Level   10/15 1250 mg IV    10/16  19.9 mcg/mL   10/17     10/18  13 mcg/mL     Height:   Ht Readings from Last 1 Encounters:   10/15/21 5' 3\" (1.6 m)     Weight:   Wt Readings from Last 1 Encounters:   10/15/21 114 lb (51.7 kg)     Level(s) / Doses:  Date Time Dose Level / Type of Level Interpretation                 Note: Serum levels collected for AUC-based dosing may be high if collected in close proximity to the dose administered.  This is not necessarily an indicator of toxicity.     Cultures & Sensitivities:    Date Site Micro Susceptibility / Result   10/15 Urine >100k Candida glabrata No further work up   10/15 Blood NGTD    10/16 MRSA PCR Negative      Labs / Ancillary Data:  Recent Labs     10/15/21  0947 10/16/21  0529 10/17/21  0430   CREATININE 2.7* 3.0* 2.6*   BUN 52* 55* 63*   WBC 9.1 8.6 11.2*       Additional Lab Values / Findings of Note:

## 2021-10-18 NOTE — CONSULTS
Comprehensive Nutrition Assessment    RECOMMENDATIONS:  1. PO Diet: Continue NPO per MD   2. Please obtain wt as able  3. Nutrition Support:  1. Recommend EN formula Renal  Nepro  @ goal rate 40 ml/hr to provide 960 ml total volume, 1700 kcal, 77 g protein and 700 ml free water. 2. Initiate EN @ 20 mL/hr and as tolerated, increase by 25 mL/hr q 4 hours until goal of 40 mL/hr is met. 3. Recommend water bolus 130 ml every 4 hours     NUTRITION ASSESSMENT:   Type and Reason for Visit:  Type and Reason for Visit: Initial, Consult   Nutritional summary & status: RD received consult for tube feeding order and management. RD ordered new wt as RD notes current wt is stated. Actual wt on 10/4 used for nutrition needs until new wt is assessed. EN recs above and will meet 100% of nutrition needs. Will monitor EN intake/tolerance throughout adm. Patient admitted d/t AMS, hypotension    PMH significant for: ESRD on HD, CAD s/p CABG, DMII, HLD, CVA w/ right-sided deficits, HTN    MALNUTRITION ASSESSMENT  Context of Malnutrition: Acute Illness   Malnutrition Status: Insufficient data    NUTRITION DIAGNOSIS   · Inadequate oral intake related to cognitive or neurological impairment as evidenced by NPO or clear liquid status due to medical condition    [address] and/or Nutrient Delivery:  Continue NPO, Start Tube Feeding  Nutrition Education/Counseling:  No recommendation at this time   Goals:  Pt will tolerate EN @ goal rate to meet 100% of needs throughout adm until able to consume po diet.        Nutrition Monitoring and Evaluation:   Food/Nutrient Intake Outcomes:  Enteral Nutrition Intake/Tolerance  Physical Signs/Symptoms Outcomes:  Biochemical Data, GI Status, Weight     OBJECTIVE DATA: Significant to nutrition assessment  · Nutrition-Focused Physical Findings: Nutrition Related Findings: lbm 10/15 watery, yellow   · Labs: Reviewed; A1C 6.0 (10/15/21), phos, Mg and K+ WNL  · Meds: Reviewed; glycolax prn, ondansetron prn  · Wounds: Wound Type: None     CURRENT NUTRITION THERAPIES  Diet NPO Exceptions are: Sips of Water with Meds     PO Intake: Average Meal Intake: NPO   PO Supplement Intake:Average Supplements Intake: NPO  IVF: n/a    ANTHROPOMETRICS  Current Height: 5' 3\" (160 cm)  Current Weight: 114 lb (51.7 kg)    Admission weight: 114 lb (51.7 kg)  Ideal Body Weight (lbs) (Calculated): 115 lbs (Ideal Body Weight (Kg) (Calculated): 52 kg)  Usual Bodyweight: RODNEY  Weight Changes: no losses noted; wt fluctuations r/t HD       BMI BMI (Calculated): 20.2    Wt Readings from Last 50 Encounters:   10/15/21 114 lb (51.7 kg)   10/07/21 113 lb 5.1 oz (51.4 kg)   08/05/20 95 lb 1.6 oz (43.1 kg)   07/08/20 97 lb 12.8 oz (44.4 kg)     COMPARATIVE STANDARDS  Energy (kcal):  3214-1308 (30-35); Weight Used for Energy Requirements:   (10/4 wt (51.4 kg))     Protein (g):  62-77 (1.2-1.5); Weight Used for Protein Requirements:   (51.4 kg)        Fluid (ml/day):  >1500mL; Method Used for Fluid Requirements:  1 ml/kcal      The patient will still be monitored per nutrition standards of care. Consult dietitian if nutrition interventions essential to patient care is needed.      Akil Pisano, [address]  [place]:  [phone]  Office:  [phone]

## 2021-10-18 NOTE — PROGRESS NOTES
Office : [phone]     Fax :[phone]         Renal Progress Note  Subjective:   Admit Date: 10/15/2021        Renetta Magallanes a 80 y.o. female with PMH ESRD on HD MWF, anemia, HTN, dementia, CAD, stroke, gastric tube in place and with colostomy in place. Patient was recently admitted on 10/04/21 from her dialysis center with a clogged dialysis catheter and received catheter replacement. Today, patient presents with AMS. Patient was reported to be due for dialysis today. Upon presentation, patient was found to be febrile. Hemoglobin was at 5.3 and patient received blood transfusion. Patient has ESRD and troponin was elevated at 0.81 and pBNP at over 70,000, troponin is trending. EKG was conducted. Patient was additionally hyponatremic at 123, and hypochloremic at 82. Imaging was conducted with summary of the radiologist's impressions indicating the following: CT abdomen and pelvis revealed an indeterminate hypodense lesion in the right hepatic lobe with recommended liver MRI, b/l pleural effusions with right greater than left, and right lower lobe consolidation which was reported as presumably passive atelectasis, and postsurgical changes in the abdomen and pelvis, no convincing evidence of an acute GI tract abnormality. CXR revealed new airspace disease involving the majority of the lung, an CT head w/out contrast revealed no acute intracranial hemorrhage or mass effect, an old insult, and cerebral atrophy. Urine analysis was conducted and urine culture is pending. In addition, patient became hypotensive in the ED. Lactic acid was found to be 4.3.  Patient received LR IVF. Empiric antibiotics were administered and patient received vancomycin and meropenem. Dialysis was held on day of admission due to patient's presentation. Patent has since been receiving continued IVF, in addition to midodrine. Patient receiving dialysis today with reported decrease in fluid off. Patient was alert and speaking which is an improvement from mental status upon presentation. DIET Diet NPO Exceptions are: Sips of Water with Meds  ADULT TUBE FEEDING; Nasogastric; Renal Formula; Continuous; 20; Yes; 25; Q 4 hours; 40; 130; Q 4 hours  Medications:   Scheduled Meds:   vancomycin  750 mg IntraVENous Once    atorvastatin  80 mg Oral Nightly    metoprolol tartrate  12.5 mg Oral BID    fluconazole  200 mg PEG Tube Daily    sodium chloride flush  5-40 mL IntraVENous 2 times per day    insulin lispro  0-6 Units SubCUTAneous Q6H    meropenem  500 mg IntraVENous Q24H    midodrine  10 mg PEG Tube TID    vancomycin (VANCOCIN) intermittent dosing (placeholder)   Other See Admin Instructions    influenza virus vaccine  0.5 mL IntraMUSCular Prior to discharge     Continuous Infusions:   sodium chloride      sodium chloride 25 mL (10/16/21 1301)    dextrose      sodium chloride         Labs:  CBC:   Recent Labs     10/16/21  0529 10/16/21  1103 10/17/21  0430 10/17/21  1945 10/18/21  0841   WBC 8.6  --  11.2*  --  9.3   HGB 9.9*   < > 9.4* 10.3* 9.0*     --  182  --  196    < > = values in this interval not displayed. BMP:    Recent Labs     10/16/21  0529 10/17/21  0430 10/18/21  0841   * 126* 130*   K 3.7 4.4 4.4   CL 89* 90* 91*   CO2 25 22 23   BUN 55* 63* 68*   CREATININE 3.0* 2.6* 2.1*   GLUCOSE 122* 105* 117*     Ca/Mg/Phos:   Recent Labs     10/16/21  0529 10/17/21  0430 10/18/21  0841   CALCIUM 8.4 8.4 8.4   MG 2.00 2.00  --    PHOS 3.6 4.2  --      Hepatic: No results for input(s): AST, ALT, ALB, BILITOT, ALKPHOS in the last 72 hours.   Troponin:   Recent Labs     10/15/21  1152   TROPONINI 0.85*     BNP: No results for input(s): BNP in the last 72 hours. Lipids: No results for input(s): CHOL, TRIG, HDL, LDLCALC, LABVLDL in the last 72 hours. ABGs: No results for input(s): PHART, PO2ART, NKG4NBT in the last 72 hours. INR:   Recent Labs     10/15/21  1955   INR 0.95     UA:No results for input(s): Jewelene Harm, GLUCOSEU, BILIRUBINUR, [place]o Bills, BLOODU, PHUR, PROTEINU, UROBILINOGEN, NITRU, LEUKOCYTESUR, Aren Lulas in the last 72 hours. Urine Microscopic: No results for input(s): LABCAST, BACTERIA, COMU, HYALCAST, WBCUA, RBCUA, EPIU in the last 72 hours. Urine Culture: No results for input(s): LABURIN in the last 72 hours. Urine Chemistry: No results for input(s): Mila High, PROTEINUR, NAUR in the last 72 hours. Objective:   Vitals: /70   Pulse 88   Temp 98.8 °F (37.1 °C) (Oral)   Resp 24   Ht 5' 3\" (1.6 m)   Wt 114 lb (51.7 kg)   LMP  (LMP Unknown)   SpO2 98%   BMI 20.19 kg/m²    Wt Readings from Last 3 Encounters:   10/15/21 114 lb (51.7 kg)   10/07/21 113 lb 5.1 oz (51.4 kg)   08/05/20 95 lb 1.6 oz (43.1 kg)      24HR INTAKE/OUTPUT:      Intake/Output Summary (Last 24 hours) at 10/18/2021 0951  Last data filed at 10/18/2021 0436  Gross per 24 hour   Intake 60 ml   Output 650 ml   Net -590 ml     IMAGING:  XR CHEST PORTABLE   Final Result      New airspace disease in the right lung. Correlate for an acute infectious process. CT ABDOMEN PELVIS WO CONTRAST Additional Contrast? None   Final Result      Postsurgical changes are present in the abdomen and pelvis as detailed above. No convincing evidence of an acute GI tract abnormality. Indeterminate hypodense lesion in the right hepatic lobe. Recommend further evaluation with liver MRI with and without contrast.      Bilateral pleural effusions, right greater than left. Right lower lobe consolidation, presumably passive atelectasis.       CT Head WO Contrast   Final Result      No acute intracranial hemorrhage or mass effect. Old insult in the brain as detailed above. Cerebral atrophy. Assessment and Plan:         Assessment/Plan   1. Hypotension     2. Anemia     3. Acid- base/ Electrolyte imbalance      4.  AMS  __________________________________  -patient receiving dialysis today with limitations due to BP  -monitor BP  -minimize IVF  -MBD management  -anemia management                [facility]   Medical Student   MS-4      Pt seen on HD   varinder well   Monitor Vol status     Marian Floyd MD

## 2021-10-18 NOTE — FLOWSHEET NOTE
10/18/21 0729 10/18/21 1105   Treatment   Time On 0736  --    Time Off  --  1100   Vital Signs   BP (!) 120/58 (!) 115/44   Temp 97.9 °F (36.6 °C) 97.6 °F (36.4 °C)   Pulse 86 96   Resp 22 20   SpO2  --  98 %   Weight 120 lb 9.5 oz (54.7 kg) 118 lb 9.7 oz (53.8 kg)   Weight Method Bed scale Bed scale   Percent Weight Change 5.78 -1.65   Dry Weight 127 lb 13.9 oz (58 kg)  --    Treatment Initiation   Dialyze Hours 3  --    Dialysis Bath   K+ (Potassium) 3  --    Ca+ (Calcium) 2.5  --    Na+ (Sodium) 138  --    HCO3 (Bicarb) 32  --    Post-Hemodialysis Assessment   Rinseback Volume (ml)  --  400 ml   Total Liters Processed (l/min)  --  52.1 l/min   Dialyzer Clearance  --  Lightly streaked   Duration of Treatment (minutes)  --  180 minutes   Hemodialysis Intake (ml)  --  400 ml   Hemodialysis Output (ml)  --  1400 ml   NET Removed (ml)  --  600 ml   Tolerated Treatment  --  Fair     Treatment time: 3 Hours  Net UF: 600 ML    Pre weight: 54.7 Kg  Post weight: 53.8 kg  EDW: 58  kg    Access used: RCW TDC   Access function: positional, high AP alarms with movement, with  to 350 ml/min    Medications or blood products given: Breathing tx for expiratory wheezing, denies SOB but appears so at times. Regular outpatient schedule: MWF at [facility]    Summary of response to treatment: Tolerated tx fair, SBP in 90s at times, unable to remove 1 L as planned due to bp and line difficulties. Copy of dialysis treatment record placed in chart, to be scanned into EMR. Report to Ria Montes RN, made aware pt still needs Vanc given as was not received in time.

## 2021-10-18 NOTE — ANESTHESIA PRE PROCEDURE
Department of Anesthesiology  Preprocedure Note       Name:  Sade Fonseca   Age:  80 y.o.  :  1950                                          MRN:  [id number]         Date:  10/18/2021      Surgeon: Elroy Knutson):  Teresita Gunter MD    Procedure: Procedure(s):  EGD DIAGNOSTIC ONLY    Medications prior to admission:   Prior to Admission medications    Medication Sig Start Date End Date Taking? Authorizing Provider   amoxicillin-clavulanate (AUGMENTIN) 875-125 MG per tablet Take 1 tablet by mouth 2 times daily To end 10/21/21 10/7/21 10/21/21 Yes Historical Provider, MD   acetaminophen (TYLENOL) 325 MG tablet 650 mg by Per G Tube route every 4 hours as needed for Pain or Fever   Yes Historical Provider, MD   insulin lispro (HUMALOG) 100 UNIT/ML injection vial Inject 0-10 Units into the skin every 6 hours Per Sliding scale   Yes Historical Provider, MD   aspirin 81 MG chewable tablet 81 mg by Per G Tube route daily   Yes Historical Provider, MD   vitamin B complex (NATURES BLEND B COMPLEX) TABS tablet 1 tablet by Per G Tube route daily   Yes Historical Provider, MD   famotidine (PEPCID) 10 MG tablet 10 mg by Per G Tube route daily For GERD for 30 days. 9/24/21 10/24/21 Yes Historical Provider, MD   atorvastatin (LIPITOR) 80 MG tablet 80 mg by Per G Tube route nightly   Yes Historical Provider, MD   midodrine (PROAMATINE) 10 MG tablet 10 mg 3 times daily Via G tube   Yes Historical Provider, MD   OLANZapine (ZYPREXA) 10 MG tablet 10 mg by Per G Tube route nightly   Yes Historical Provider, MD   insulin glargine (SEMGLEE) 100 UNIT/ML injection vial Inject 27 Units into the skin 2 times daily   Yes Historical Provider, MD   valproic acid (DEPAKENE) 250 MG/5ML SOLN oral solution 130 mg by Per G Tube route 3 times daily   Yes Historical Provider, MD   gabapentin (NEURONTIN) 100 MG capsule Take 100 mg by mouth 3 times daily.    Yes Historical Provider, MD       Current medications:    Current Facility-Administered DO Jad        insulin lispro (1 Unit Dial) 0-6 Units  0-6 Units SubCUTAneous Q6H Erma Jad DO   1 Units at 10/17/21 1812    0.9 % sodium chloride infusion   IntraVENous PRN Erma Street DO        meropenem (MERREM) 500 mg IVPB (mini-bag)  500 mg IntraVENous Q24H Ermaanna Street DO   Stopped at 10/17/21 1021    midodrine (PROAMATINE) tablet 10 mg  10 mg PEG Tube TID Floyd Gonzalez DO   10 mg at 10/17/21 2020    vancomycin (VANCOCIN) intermittent dosing (placeholder)   Other See Admin Instructions Erma Street DO        influenza quadrivalent split vaccine (FLUZONE;FLUARIX;FLULAVAL;AFLURIA) injection 0.5 mL  0.5 mL IntraMUSCular Prior to discharge Floyd Gonzalez DO           Allergies: Allergies   Allergen Reactions    Iodine Hives       Problem List:    Patient Active Problem List   Diagnosis Code    Peripheral vascular disease ([facility] I73.9    Shortness of breath R06.02    Other chest pain R07.89    Diabetes mellitus ([facility] E11.9    Carotid stenosis I65.29    CAD (coronary artery disease) I25.10    Hyperlipidemia E78.5    Vasovagal syncope R55    Pure hypercholesterolemia E78.00    Status post left heart catheterization Z98.890    Anemia D64.9    NSTEMI (non-ST elevated myocardial infarction) ([facility]) I21.4    CAD S/P percutaneous coronary angioplasty I25.10, Z98.61    Essential hypertension I10    Unstable angina ([facility]) I20.0    Fatigue L85.31    Complication associated with dialysis catheter T82. 9XXA    ESRD (end stage renal disease) ([facility] N18.6    Anemia in ESRD (end-stage renal disease) ([facility]) N18.6, D63.1    Electrolyte imbalance E87.8    Sepsis ([facility]) A41.9    Altered mental status R41.82       Past Medical History:        Diagnosis Date    CAD (coronary artery disease)     s/p CABG 2000. Cath 9/10 1/3 grafts occluded. Patent LIMA-LAD, patent SVG-OM, 60% distal LAD lesion distal to anastamosis, occluded SVG-LCX,, 70% prox native LCx, diffuse RCA dz. Declines redo CABG.   will consider PCI of LAD and LCX    Carotid artery disease ([facility]     Diabetes mellitus ([facility]     followed by PCP    Hyperlipidemia     rec semi annual lipids w/ LDL goal <70    Lung mass     likely benign    Syncope     secondary to medications       Past Surgical History:        Procedure Laterality Date    BREAST SURGERY      left breast tumor removed    CARDIAC SURGERY  2000    CABG    CHOLECYSTECTOMY      CORONARY ANGIOPLASTY WITH STENT PLACEMENT  2019    CORONARY ARTERY BYPASS GRAFT      DIAGNOSTIC CARDIAC CATH LAB PROCEDURE      KNEE SURGERY      UPPER GASTROINTESTINAL ENDOSCOPY N/A 10/14/2019    EGD DIAGNOSTIC ONLY performed by Rangel Corona MD at [facility] History:    Social History     Tobacco Use    Smoking status: Never Smoker    Smokeless tobacco: Never Used   Substance Use Topics    Alcohol use: No                                Counseling given: Not Answered      Vital Signs (Current):   Vitals:    10/18/21 0400 10/18/21 0600 10/18/21 1007 10/18/21 1148   BP: 126/70   (!) 106/43   Pulse: 87 88  93   Resp: 24   20   Temp: 98.8 °F (37.1 °C)   97.7 °F (36.5 °C)   TempSrc: Oral   Oral   SpO2: 98%  98% 98%   Weight:       Height:                                                  BP Readings from Last 3 Encounters:   10/18/21 (!) 106/43   10/07/21 104/64   09/11/21 (!) 123/52       NPO Status:                                                                                 BMI:   Wt Readings from Last 3 Encounters:   10/15/21 114 lb (51.7 kg)   10/07/21 113 lb 5.1 oz (51.4 kg)   08/05/20 95 lb 1.6 oz (43.1 kg)     Body mass index is 20.19 kg/m².     CBC:   Lab Results   Component Value Date    WBC 9.3 10/18/2021    RBC 2.79 10/18/2021    HGB 9.0 10/18/2021    HCT 26.7 10/18/2021    MCV 95.9 10/18/2021    RDW 20.2 10/18/2021     10/18/2021       CMP:   Lab Results   Component Value Date     10/18/2021    K 4.4 10/18/2021    CL 91 10/18/2021    CO2 23 10/18/2021    BUN 68 10/18/2021 CREATININE 2.1 10/18/2021    GFRAA 28 10/18/2021    AGRATIO 1.1 10/15/2021    LABGLOM 23 10/18/2021    GLUCOSE 117 10/18/2021    PROT 5.8 10/15/2021    CALCIUM 8.4 10/18/2021    BILITOT <0.2 10/15/2021    ALKPHOS 183 10/15/2021    AST 35 10/15/2021    ALT 22 10/15/2021       POC Tests:   Recent Labs     10/18/21  1143   POCGLU 149*       Coags:   Lab Results   Component Value Date    PROTIME 10.7 10/15/2021    INR 0.95 10/15/2021    APTT 21.5 10/15/2021       HCG (If Applicable): No results found for: PREGTESTUR, PREGSERUM, HCG, HCGQUANT     ABGs: No results found for: PHART, PO2ART, PYZ9MDX, OYD1PLE, BEART, P0TAEHAX     Type & Screen (If Applicable):  No results found for: LABABO, LABRH    Drug/Infectious Status (If Applicable):  No results found for: HIV, HEPCAB    COVID-19 Screening (If Applicable): No results found for: COVID19        Anesthesia Evaluation  Patient summary reviewed no history of anesthetic complications:   Airway: Mallampati: III        Dental:          Pulmonary:                              Cardiovascular:    (+) hypertension:, past MI:, CAD:,                ROS comment: PVD  Carotid artery disease      Neuro/Psych:                ROS comment: Altered mental status GI/Hepatic/Renal:   (+) renal disease: ESRD and dialysis,           Endo/Other:    (+) Diabetes, . Abdominal:             Vascular: Other Findings:             Anesthesia Plan      general and MAC     ASA 4     (Pt is non verbal)  Induction: intravenous.                           Gerber Winters MD   10/18/2021

## 2021-10-18 NOTE — ANESTHESIA POSTPROCEDURE EVALUATION
Department of Anesthesiology  Postprocedure Note    Patient: Caleb Hardy  MRN: [id number]  [place]: 1950  Date of evaluation: 10/18/2021  Time:  3:35 PM     Procedure Summary     Date: 10/18/21 Room / Location: [address]    Anesthesia Start: 1500 Anesthesia Stop: 2132    Procedure: EGD BIOPSY (N/A ) Diagnosis: (anemia)    Surgeons: Aron Goddard MD Responsible Provider: Janet Vaughn MD    Anesthesia Type: general, MAC ASA Status: 4          Anesthesia Type: general, MAC    Jean Phase I: Jean Score: 7    Jean Phase II: Jean Score: 7    Last vitals: Reviewed and per EMR flowsheets.        Anesthesia Post Evaluation    Patient location during evaluation: PACU  Patient participation: complete - patient participated  Level of consciousness: awake and lethargic  Airway patency: patent  Nausea & Vomiting: no nausea and no vomiting  Complications: no  Cardiovascular status: hemodynamically stable and blood pressure returned to baseline  Respiratory status: spontaneous ventilation and nasal cannula  Hydration status: stable

## 2021-10-18 NOTE — PROGRESS NOTES
Physician Progress Note      [facility]               Yogi Bazzi  CSN #:                  [id number]  :                       1950  ADMIT DATE:       10/15/2021 8:51 AM  DISCH DATE:  RESPONDING  PROVIDER #:        Rachael Craig MD          QUERY TEXT:    Patient admitted with sepsis-UTI-Pna. Noted documentation of acute respiratory   failure in H&P. In order to support the diagnosis of acute respiratory   failure, please include additional clinical indicators in your documentation. Or please document if the diagnosis of acute respiratory failure has been   ruled out after further study. The medical record reflects the following:  Risk Factors: 79 yo w/ UTI, possible pneumonia  Clinical Indicators: Per H&P: Acute hypoxemic respiratory failure. B/l pleural   effusions on imaging, atelectasis vs PNA. Per ED:  Effort: Pulmonary effort   is normal. Normal breath sounds. Per PN 10/17: no accessory muscle use, no   respiratory distress. Sats 88%. RR 18 - 24. Treatment: 3L weaned to RA now. Acute Respiratory Failure Clinical Indicators per 3[facility] MS-DRG Training Guide and   Quick Reference Guide:  pO2 < 60 mmHg or SpO2 (pulse oximetry) < 91% breathing room air  pCO2 > 50 and pH < 7.35  P/F ratio (pO2 / FIO2) < 300  pO2 decrease or pCO2 increase by 10 mmHg from baseline (if known)  Supplemental oxygen of 40% or more  Presence of respiratory distress, tachypnea, dyspnea, shortness of breath,   wheezing  Unable to speak in complete sentences  Use of accessory muscles to breathe  Extreme anxiety and feeling of impending doom  Tripod position  Confusion/altered mental status/obtunded  Options provided:  -- Acute Respiratory Failure ruled out after study  -- Acute Respiratory Failure as evidenced by, Please document evidence.   -- Other - I will add my own diagnosis  -- Disagree - Not applicable / Not valid  -- Disagree - Clinically unable to determine / Unknown  -- Refer to Clinical Documentation Reviewer    PROVIDER RESPONSE TEXT:    Acute Respiratory Failure has been ruled out after study.     Query created by: Ave Dougherty on 10/18/2021 12:34 PM      Electronically signed by:  Amanda Cordero MD 10/18/2021 1:47 PM

## 2021-10-18 NOTE — CONSULTS
[facility]  Palliative Medicine Consultation Note      Date Of Admission:10/15/2021  Date of consult: 10/18/21  Seen by [facility] in the past:  No    Recommendations:        Pt is awake and answers some simple questions, but is not able to answer any orientation questions. Called her son Bowen Pollack who states he is pt's only child and therefore surrogate decision maker. Introduced palliative care and discussed goals of care. He says she has good days and bad days. He has been considering her quality of life recently and has thought about the option of taking her home with hospice. We talked about how the decision to stop dialysis is difficult to make, and I provided some information about hospice and inpatient units. Offered outpatient palliative care to follow her at the SNF and he was agreeable. LVM for CM regarding outpatient palliative care referral.    1. Goals of Care/Advanced Care planning/Code status: DNR/DNI (Limited- yes to meds only). Family wants to continue aggressive care, though her son has been considering her quality of life and comfort care recently. 2. Pain: pt denies pain. 3. SOB: denies sob and is breathing comfortably on room air. 4. AMS: pt is unable to answer any orientation questions today. Her son reports her mental status fluctuates, but that lately she has had many days when she is confused and unable to make herself understood.  She has hx of stroke, no acute changes on head CT 10/15.  5. Disposition: d/c to SNF with outpatient palliative care referral when medically ready    Reason for Consult:         [x]  Goals of Care  [x]  Code Status Discussion/Advanced Care Planning   [x]  Psychosocial/Family Support  [x]  Symptom Management  []  Other (Specify)    Requesting Physician: Dr. Menezes Ear:  Altered mental status, hypotension    History Obtained From:  family member - son Bowen Pollack, electronic medical record    History of Present Illness:         Clayton Avery Mohan Curiel is a 80 y.o. female with PMH of ESRD on HD, CAD s/p CABG, DMII, HLD, CVA w/ right-sided deficits, HTN, admission for septic shock in 7/2021 secondary to ischemic bowel, nstemi type 2 due to demand ischemia, dysphagia, anemia who presented with altered mental status and hypotension. In the ED she was hypotensive, anemic to 5, lactic acid > 4, UA with signs of infection, and during examination also appeared to have some purulent drainage from the rectum. She received 750 ml LR, 1 unit of pRBCs, vancomycin and meropenem. He was admitted with sepsis secondary to ischemic colitis and possible UTI, and acute encephalopathy. Surgery was consulted but did not recommend surgical intervention. GI was consulted and recommended medical management. Subjective:         Past Medical History:        Diagnosis Date    CAD (coronary artery disease)     s/p CABG 2000. Cath 9/10 1/3 grafts occluded. Patent LIMA-LAD, patent SVG-OM, 60% distal LAD lesion distal to anastamosis, occluded SVG-LCX,, 70% prox native LCx, diffuse RCA dz. Declines redo CABG.   will consider PCI of LAD and LCX    Carotid artery [facility] [address] [address]     Diabetes mellitus ([facility] [address]     followed by PCP    Hyperlipidemia     rec semi annual lipids w/ LDL goal <70    Lung mass     likely benign    Syncope     secondary to medications       Past Surgical History:        Procedure Laterality Date    BREAST SURGERY      left breast tumor removed    CARDIAC SURGERY  2000    CABG    CHOLECYSTECTOMY      CORONARY ANGIOPLASTY WITH STENT PLACEMENT  2019    CORONARY ARTERY BYPASS GRAFT      DIAGNOSTIC CARDIAC CATH LAB PROCEDURE      KNEE SURGERY      UPPER GASTROINTESTINAL ENDOSCOPY N/A 10/14/2019    EGD DIAGNOSTIC ONLY performed by Denis Garrett MD at [facility]       Current Medications:    Medications Prior to Admission: amoxicillin-clavulanate (AUGMENTIN) 875-125 MG per tablet, Take 1 tablet by mouth 2 times daily To end 10/21/21  acetaminophen Significant disease; Can't do hobbies/housework; intake normal or reduced; occasional assist; LOC full/confusion  [] 50% Mainly sit/lie; Extensive disease; Can't do any work; Considerable assist; intake normal  Or reduced; LOC full/confusion  [] 40% Mainly in bed; Extensive disease; Mainly assist; intake normal or reduced; occasional assist; LOC full/confusion  [x] 30% Bed Bound; Extensive disease; Total care; intake reduced; LOC full/confusion  [] 20% Bed Bound; Extensive disease; Total care; intake minimal; Drowsy/coma  [] 10% Bed Bound; Extensive disease; Total care; Mouth care only; Drowsy/coma  [] 0% Death      Vitals:    /70   Pulse 88   Temp 98.8 °F (37.1 °C) (Oral)   Resp 24   Ht 5' 3\" (1.6 m)   Wt 114 lb (51.7 kg)   LMP  (LMP Unknown)   SpO2 98%   BMI 20.19 kg/m²     Labs:    BMP:   Recent Labs     10/16/21  0529 10/17/21  0430 10/18/21  0841   * 126* 130*   K 3.7 4.4 4.4   CL 89* 90* 91*   CO2 25 22 23   BUN 55* 63* 68*   CREATININE 3.0* 2.6* 2.1*   GLUCOSE 122* 105* 117*     CBC:   Recent Labs     10/16/21  0529 10/16/21  1103 10/17/21  0430 10/17/21  1945 10/18/21  0841   WBC 8.6  --  11.2*  --  9.3   HGB 9.9*   < > 9.4* 10.3* 9.0*   HCT 29.3*   < > 27.7* 30.6* 26.7*     --  182  --  196    < > = values in this interval not displayed. LFT's: No results for input(s): AST, ALT, ALB, BILITOT, ALKPHOS in the last 72 hours. Troponin:   Recent Labs     10/15/21  1152   TROPONINI 0.85*     BNP: No results for input(s): BNP in the last 72 hours. ABGs: No results for input(s): PHART, OOT7BVV, PO2ART in the last 72 hours. INR:   Recent Labs     10/15/21  1955   INR 0.95       U/A:No results for input(s): NITRITE, COLORU, PHUR, LABCAST, WBCUA, RBCUA, MUCUS, TRICHOMONAS, YEAST, BACTERIA, CLARITYU, SPECGRAV, LEUKOCYTESUR, UROBILINOGEN, BILIRUBINUR, BLOODU, GLUCOSEU, AMORPHOUS in the last 72 hours.     Invalid input(s): KETONESU    XR CHEST PORTABLE   Final Result      New airspace disease in the right lung. Correlate for an acute infectious process. CT ABDOMEN PELVIS WO CONTRAST Additional Contrast? None   Final Result      Postsurgical changes are present in the abdomen and pelvis as detailed above. No convincing evidence of an acute GI tract abnormality. Indeterminate hypodense lesion in the right hepatic lobe. Recommend further evaluation with liver MRI with and without contrast.      Bilateral pleural effusions, right greater than left. Right lower lobe consolidation, presumably passive atelectasis. CT Head WO Contrast   Final Result      No acute intracranial hemorrhage or mass effect. Old insult in the brain as detailed above. Cerebral atrophy. Conclusion/Time spent:         Recommendations see above    Pt [id number], Family [phone]  Time spent with patient and/or family: 20 min  Time reviewing records: 10 min   Time communicating with staff: 5 min     A total of 35 minutes spent with the patient and family on unit greater than 50% in counseling regarding palliative care and in goals of care for the patient. Thank you to Dr. Davon Chappell for this consultation. We will continue to follow Ms. Arndt's care as needed.     Maryam Lira NP  [address] [place] [address]

## 2021-10-18 NOTE — PROGRESS NOTES
Hospitalist Progress Note      PCP: AYDEN Weinberg    Date of Admission: 10/15/2021    Chief Complaint: Altered mental status, hypotension    History Of Present Illness:       Jerod Roth is a 80 y.o. female with past medical history as below, including ESRD on HD, CAD s/p CABG, DMII, HLD, CVA w/ right-sided deficits, HTN, admission for septic shock in 7/2021 secondary to ischemic bowel, nstemi type 2 due to demand ischemia, dysphagia, anemia, reported melena earlier this year per chart review. She was noted to be alert to self with some underlying confusion when she was discharged in July and was not able to communicate any history on last admission to [facility] due to reported baseline mental status.      The patient cannot give any history.      Myself and other staff have tried to reach nursing home multiple times however have not been able to obtain further recent history.       In the ED she was hypotensive, anemic to 5, lactic acid > 4, UA with signs of infection, and during examination also appeared to have some purulent drainage from the rectum. She received 750 ml LR, 1 unit of pRBCs, vancomycin and meropenem. Subjective:   Patient seen in hemodialysis. Alert and oriented x2. Patient had audible wheezing but denied any shortness of breath. Denies any other complaints.     Medications:  Reviewed    Infusion Medications    sodium chloride      sodium chloride 25 mL (10/16/21 1301)    dextrose      sodium chloride       Scheduled Medications    atorvastatin  80 mg Oral Nightly    metoprolol tartrate  12.5 mg Oral BID    fluconazole  200 mg PEG Tube Daily    sodium chloride flush  5-40 mL IntraVENous 2 times per day    insulin lispro  0-6 Units SubCUTAneous Q6H    meropenem  500 mg IntraVENous Q24H    midodrine  10 mg PEG Tube TID    vancomycin (VANCOCIN) intermittent dosing (placeholder)   Other See Admin Instructions    influenza virus vaccine  0.5 mL IntraMUSCular Prior to discharge     PRN Meds: sodium chloride, sodium chloride flush, sodium chloride, ondansetron **OR** ondansetron, polyethylene glycol, acetaminophen **OR** acetaminophen, perflutren lipid microspheres, glucose, glucagon (rDNA), dextrose, dextrose, sodium chloride      Intake/Output Summary (Last 24 hours) at 10/18/2021 0805  Last data filed at 10/18/2021 0436  Gross per 24 hour   Intake 60 ml   Output 650 ml   Net -590 ml       Exam:    /70   Pulse 88   Temp 98.8 °F (37.1 °C) (Oral)   Resp 24   Ht 5' 3\" (1.6 m)   Wt 114 lb (51.7 kg)   LMP  (LMP Unknown)   SpO2 98%   BMI 20.19 kg/m²     General appearance: No apparent distress, appears stated age and cooperative. HEENT: Pupils equal, round, and reactive to light. Conjunctivae/corneas clear. Neck: Supple, with full range of motion. No jugular venous distention. Trachea midline. Respiratory:  Normal respiratory effort. Audible wheeze with decreased breath sounds bilaterally. Mild erythema at dressing site noted. HD catheter line itself without any erythema or discharge   Cardiovascular: Regular rate and rhythm with normal S1/S2 without murmurs, rubs or gallops. Abdomen: Soft, non-tender, non-distended with normal bowel sounds. PEG tube in place  Musculoskelatal: No clubbing, cyanosis or edema bilaterally. Skin: Skin color, texture, turgor normal.  No rashes or lesions. Neurologic:  Cranial nerves: II-XII intact, grossly non-focal.  Psychiatric: Alert and oriented x2    Labs:   Recent Labs     10/15/21  0947 10/15/21  1455 10/16/21  0529 10/16/21  1103 10/17/21  0312 10/17/21  0430 10/17/21  1945   WBC 9.1  --  8.6  --   --  11.2*  --    HGB 5.3*   < > 9.9*   < > 9.4* 9.4* 10.3*   HCT 15.6*   < > 29.3*   < > 27.8* 27.7* 30.6*     --  135  --   --  182  --     < > = values in this interval not displayed.      Recent Labs     10/15/21  0947 10/16/21  0529 10/17/21  0430   * 127* 126*   K 3.5 3.7 4.4   CL 82* 89* 90*   CO2 26 25 22 BUN 52* 55* 63*   CREATININE 2.7* 3.0* 2.6*   CALCIUM 8.9 8.4 8.4   PHOS  --  3.6 4.2     Recent Labs     10/15/21  0947   AST 35   ALT 22   BILITOT <0.2   ALKPHOS 183*     Recent Labs     10/15/21  1955   INR 0.95     Recent Labs     10/15/21  0947 10/15/21  1152   TROPONINI 0.81* 0.85*       Studies:  XR CHEST PORTABLE   Final Result      New airspace disease in the right lung. Correlate for an acute infectious process. CT ABDOMEN PELVIS WO CONTRAST Additional Contrast? None   Final Result      Postsurgical changes are present in the abdomen and pelvis as detailed above. No convincing evidence of an acute GI tract abnormality. Indeterminate hypodense lesion in the right hepatic lobe. Recommend further evaluation with liver MRI with and without contrast.      Bilateral pleural effusions, right greater than left. Right lower lobe consolidation, presumably passive atelectasis. CT Head WO Contrast   Final Result      No acute intracranial hemorrhage or mass effect. Old insult in the brain as detailed above. Cerebral atrophy.              Assessment/Plan:    Active Hospital Problems    Diagnosis Date Noted    Altered mental status [R41.82]     Sepsis ([facility] [address] [A41.9] 10/15/2021     Hypotension likely d/t sepsis (Temp 102.4, hypotension, purulent drainage noted from rectum in ED)  Acute encephalopathy in the setting of prior CVA, dementia, per chart review oriented to self at baseline  Hyponatremia  ESRD on HD  UTI  Acute on chronic anemia  History of septic shock for ischemic colitis and NSTEMI type II in 7/2021  Hx of CVA with R-sided deficits  Dysphagia with PEG tube at baseline  Troponin elevation   CAD s/p CABG 2000  B/l pleural effusions  Iodine allergy  DMII     PLAN:     Acute encephalopathy in the setting of prior CVA, dementia, per chart review oriented to self at baseline  CT head no acute abnormalities  Multiple etiology including sepsis, hyponatremia  Treatment as below     Severe Sepsis, lactic acid > 4  Hx of septic shock with ischemic colitis, purulence noted for rectum per ED providers  CT (10/15) showed some mild strnding around Anna's stump consistent with post-surgical changes  General surgery consulted for possible colitis? . No acute surgical intervention indicated per surgery  Sec to UTI vs PNA vs ?line infection. Has central access. Mild erythema at dressing site. No erythema or discharge or tenderness noted at cathter site  Blood cultures with no growth to date. MRSA negative  Urine culture positive for Candida glabrata   Line culture pending  Will continue vancomycin and meropenem for now  Fluconazole discontinued. Voriconazole added for Candida glabrata UTI    Hypotension  Treatment for sepsis with antibiotics and IV fluids approx 2 L  Continue home midodrine     Hyponatremia  ESRD on HD  Nephrology consulted for HD  Receiving IV fluids     Acute on chronic anemia, severe  Hgb 5.3 in ED  Per chart review there was a note with concern for black stools though she is on iron therapy. Stool is brown currently. Iron studies showed normal iron/iron saturation, low TIBC and elevated ferritin  S/p 2 units pRBCs  FOBT pending  Continue Protonix IV BID for now  Plan for EGD today  GI following, appreciate recommendation     Hypoxia  Acute hypoxemic respiratory failure ruled out  B/l pleural effusions on imaging, atelectasis vs PNA  Antibiotics as above  Patient required 2 L on admission.   Off oxygen now     Troponin elevation likely due to demand ischemia in the setting of sepsis and ESRD  CAD s/p CABG 2000  Patient has ST changes that appear similar but more pronounced since her last EKG  Hold anticoagulation due to acute anemia with Hgb of 5  Transfusion goal Hgb >8  Cardiology consulted  Ischemic evaluation to be done outpatient  Lopressor low dose as BP allows  Continue statin     DMII  Monitor blood glucose  Low dose sliding scale on NPO algorithm     Dysphagia with PEG tube at baseline  N.p.o. currently.   Plan for EGD today          DVT Prophylaxis: SCDs only for now  Diet: Diet NPO Exceptions are: Sips of Water with Meds  Code Status: Limited    PT/OT Eval Status: from long term care    Dispo -pending EGD, hemoglobin stability, culture result, antibiotic determination    Jaxon Aponte MD  Attending Physician  Hospitalist

## 2021-10-18 NOTE — PROGRESS NOTES
General Surgery   Daily Progress Note  Patient: Caleb Hardy      CC: ischemic bowel, s/p extended Anna's at OSH    SUBJECTIVE:   No issues overnight. Patient is non-verbal. Patient has remained afebrile and HDS.     ROS:   A 14 point review of systems was conducted, significant findings as noted above. All other systems negative. OBJECTIVE:    PHYSICAL EXAM:    Vitals:    10/17/21 2300 10/18/21 0115 10/18/21 0400 10/18/21 0600   BP:  (!) 148/58 126/70    Pulse: 85 93 87 88   Resp:  22 24    Temp:  99.9 °F (37.7 °C) 98.8 °F (37.1 °C)    TempSrc:  Oral Oral    SpO2:  96% 98%    Weight:       Height:         General appearance: resting in bed, non-verbal  HENT: trachea midline, no JVD, no LAD  Eyes: PERRLA, no scleral icterus  Chest/Lungs: Normal effort on 2L NC   Cardiovascular: RRR, brisk capillary refill distally  Abdomen: soft, non-tender, non-distended, ostomy pink and viable with loose brown stool. PEG tube in place  Skin: warm and dry  Extremities: no edema , no cyanosis    LABS:   Recent Labs     10/16/21  0529 10/16/21  1103 10/17/21  0430 10/17/21  1945   WBC 8.6  --  11.2*  --    HGB 9.9*   < > 9.4* 10.3*   HCT 29.3*   < > 27.7* 30.6*   MCV 95.0  --  94.0  --      --  182  --     < > = values in this interval not displayed. Recent Labs     10/16/21  0529 10/17/21  0430   * 126*   K 3.7 4.4   CL 89* 90*   CO2 25 22   PHOS 3.6 4.2   BUN 55* 63*   CREATININE 3.0* 2.6*        Recent Labs     10/15/21  0947   AST 35   ALT 22   BILITOT <0.2   ALKPHOS 183*      No results for input(s): LIPASE, AMYLASE in the last 72 hours. Recent Labs     10/15/21  0947 10/15/21  1955   PROT 5.8*  --    INR  --  0.95   APTT  --  21.5*        Recent Labs     10/15/21  0947 10/15/21  1152   TROPONINI 0.81* 0.85*         ASSESSMENT & PLAN:   This is a 80 y.o. female with an extensive medical history who presented with hypotension and found to be anemic.  CT (10/15) showed some mild strnding around Anna's stump consistent with post-surgical changes. +UTI    - plan for EGD today with GI; f/u results  - HgB remains stable on q12hr checks; if cont to remain stable; recommend q24hr checks  - WBC remains within normal limits, follow up blood cx; currently NGTD  - Abdominal exam remains the same, no indication for acute surgical intervention; OK to feed per PEG tube once stable from medical standpoint  - cardiology on board given hypertropic anemia on admission  - medical management per primary    Tyler Miranda DO  PGY-1, General Surgery  10/18/21  6:36 AM  [phone]    I have seen, examined, and reviewed the patients chart. I agree with the residents assessment and have made appropriate changes.     Julieth Fair

## 2021-10-18 NOTE — PROCEDURES
esophagitis. No Swann's. Recommendations:   1. Await biopsy results  2.   PPI daily      John Shoemaker MD  [facility] [place]

## 2021-10-18 NOTE — PLAN OF CARE
Problem: Discharge Planning:  Goal: Discharged to appropriate level of care  Description: Discharged to appropriate level of care  Outcome: Ongoing  Note: HD this AM per home schedule. EGD this afternoon, biopsies pending. Needs echo. TF resumed at 20/hr, goal 40. Back to [facility] at discharge.

## 2021-10-18 NOTE — PLAN OF CARE
Problem: Nutrition  Goal: Optimal nutrition therapy  Outcome: Ongoing  Nutrition Problem #1: Inadequate oral intake  Intervention: Food and/or Nutrient Delivery: Continue NPO, Start Tube Feeding  Nutritional Goals: Pt will tolerate EN @ goal rate to meet 100% of needs throughout adm until able to consume po diet.

## 2021-10-19 LAB
ANION GAP SERPL CALCULATED.3IONS-SCNC: 13 MMOL/L (ref 3–16)
BASOPHILS ABSOLUTE: 0 K/UL (ref 0–0.2)
BASOPHILS RELATIVE PERCENT: 0.3 %
BLOOD CULTURE, ROUTINE: NORMAL
BUN BLDV-MCNC: 37 MG/DL (ref 7–20)
CALCIUM SERPL-MCNC: 8.5 MG/DL (ref 8.3–10.6)
CHLORIDE BLD-SCNC: 96 MMOL/L (ref 99–110)
CO2: 23 MMOL/L (ref 21–32)
CREAT SERPL-MCNC: 1.5 MG/DL (ref 0.6–1.2)
CULTURE, BLOOD 2: NORMAL
EOSINOPHILS ABSOLUTE: 0.1 K/UL (ref 0–0.6)
EOSINOPHILS RELATIVE PERCENT: 1.4 %
GFR AFRICAN AMERICAN: 42
GFR NON-AFRICAN AMERICAN: 34
GLUCOSE BLD-MCNC: 239 MG/DL (ref 70–99)
GLUCOSE BLD-MCNC: 247 MG/DL (ref 70–99)
GLUCOSE BLD-MCNC: 254 MG/DL (ref 70–99)
GLUCOSE BLD-MCNC: 266 MG/DL (ref 70–99)
GLUCOSE BLD-MCNC: 286 MG/DL (ref 70–99)
GLUCOSE BLD-MCNC: 294 MG/DL (ref 70–99)
HCT VFR BLD CALC: 26.6 % (ref 36–48)
HCT VFR BLD CALC: 27.1 % (ref 36–48)
HEMOGLOBIN: 9 G/DL (ref 12–16)
HEMOGLOBIN: 9.2 G/DL (ref 12–16)
LV EF: 18 %
LVEF MODALITY: NORMAL
LYMPHOCYTES ABSOLUTE: 0.8 K/UL (ref 1–5.1)
LYMPHOCYTES RELATIVE PERCENT: 9.8 %
MCH RBC QN AUTO: 32.1 PG (ref 26–34)
MCHC RBC AUTO-ENTMCNC: 33.8 G/DL (ref 31–36)
MCV RBC AUTO: 95 FL (ref 80–100)
MONOCYTES ABSOLUTE: 1.5 K/UL (ref 0–1.3)
MONOCYTES RELATIVE PERCENT: 17.5 %
NEUTROPHILS ABSOLUTE: 6.1 K/UL (ref 1.7–7.7)
NEUTROPHILS RELATIVE PERCENT: 71 %
PDW BLD-RTO: 19.6 % (ref 12.4–15.4)
PERFORMED ON: ABNORMAL
PLATELET # BLD: 171 K/UL (ref 135–450)
PMV BLD AUTO: 8.7 FL (ref 5–10.5)
POTASSIUM REFLEX MAGNESIUM: 3.9 MMOL/L (ref 3.5–5.1)
RBC # BLD: 2.8 M/UL (ref 4–5.2)
SODIUM BLD-SCNC: 132 MMOL/L (ref 136–145)
WBC # BLD: 8.6 K/UL (ref 4–11)

## 2021-10-19 PROCEDURE — 6370000000 HC RX 637 (ALT 250 FOR IP): Performed by: INTERNAL MEDICINE

## 2021-10-19 PROCEDURE — 80048 BASIC METABOLIC PNL TOTAL CA: CPT

## 2021-10-19 PROCEDURE — 93306 TTE W/DOPPLER COMPLETE: CPT

## 2021-10-19 PROCEDURE — 1200000000 HC SEMI PRIVATE

## 2021-10-19 PROCEDURE — 36415 COLL VENOUS BLD VENIPUNCTURE: CPT

## 2021-10-19 PROCEDURE — 85018 HEMOGLOBIN: CPT

## 2021-10-19 PROCEDURE — B246ZZZ ULTRASONOGRAPHY OF RIGHT AND LEFT HEART: ICD-10-PCS | Performed by: INTERNAL MEDICINE

## 2021-10-19 PROCEDURE — 94640 AIRWAY INHALATION TREATMENT: CPT

## 2021-10-19 PROCEDURE — 6360000002 HC RX W HCPCS: Performed by: INTERNAL MEDICINE

## 2021-10-19 PROCEDURE — 2580000003 HC RX 258: Performed by: INTERNAL MEDICINE

## 2021-10-19 PROCEDURE — 85014 HEMATOCRIT: CPT

## 2021-10-19 PROCEDURE — 99233 SBSQ HOSP IP/OBS HIGH 50: CPT | Performed by: INTERNAL MEDICINE

## 2021-10-19 PROCEDURE — 85025 COMPLETE CBC W/AUTO DIFF WBC: CPT

## 2021-10-19 PROCEDURE — C9113 INJ PANTOPRAZOLE SODIUM, VIA: HCPCS | Performed by: INTERNAL MEDICINE

## 2021-10-19 RX ORDER — INSULIN LISPRO 100 [IU]/ML
0-12 INJECTION, SOLUTION INTRAVENOUS; SUBCUTANEOUS EVERY 4 HOURS
Status: DISCONTINUED | OUTPATIENT
Start: 2021-10-19 | End: 2021-10-19

## 2021-10-19 RX ORDER — INSULIN LISPRO 100 [IU]/ML
0-12 INJECTION, SOLUTION INTRAVENOUS; SUBCUTANEOUS EVERY 6 HOURS
Status: DISCONTINUED | OUTPATIENT
Start: 2021-10-19 | End: 2021-10-20

## 2021-10-19 RX ADMIN — VORICONAZOLE 200 MG: 200 TABLET ORAL at 21:00

## 2021-10-19 RX ADMIN — MIDODRINE HYDROCHLORIDE 10 MG: 5 TABLET ORAL at 10:28

## 2021-10-19 RX ADMIN — INSULIN LISPRO 2 UNITS: 100 INJECTION, SOLUTION INTRAVENOUS; SUBCUTANEOUS at 04:49

## 2021-10-19 RX ADMIN — VORICONAZOLE 200 MG: 200 TABLET ORAL at 10:28

## 2021-10-19 RX ADMIN — METOPROLOL TARTRATE 12.5 MG: 25 TABLET, FILM COATED ORAL at 10:28

## 2021-10-19 RX ADMIN — PANTOPRAZOLE SODIUM 40 MG: 40 INJECTION, POWDER, FOR SOLUTION INTRAVENOUS at 20:25

## 2021-10-19 RX ADMIN — SODIUM CHLORIDE, PRESERVATIVE FREE 10 ML: 5 INJECTION INTRAVENOUS at 10:28

## 2021-10-19 RX ADMIN — ALBUTEROL SULFATE 2.5 MG: 2.5 SOLUTION RESPIRATORY (INHALATION) at 11:07

## 2021-10-19 RX ADMIN — MEROPENEM 500 MG: 500 INJECTION, POWDER, FOR SOLUTION INTRAVENOUS at 10:33

## 2021-10-19 RX ADMIN — INSULIN LISPRO 6 UNITS: 100 INJECTION, SOLUTION INTRAVENOUS; SUBCUTANEOUS at 11:21

## 2021-10-19 RX ADMIN — PANTOPRAZOLE SODIUM 40 MG: 40 INJECTION, POWDER, FOR SOLUTION INTRAVENOUS at 10:27

## 2021-10-19 RX ADMIN — SODIUM CHLORIDE, PRESERVATIVE FREE 10 ML: 5 INJECTION INTRAVENOUS at 23:36

## 2021-10-19 RX ADMIN — MIDODRINE HYDROCHLORIDE 10 MG: 5 TABLET ORAL at 15:38

## 2021-10-19 RX ADMIN — INSULIN LISPRO 6 UNITS: 100 INJECTION, SOLUTION INTRAVENOUS; SUBCUTANEOUS at 17:31

## 2021-10-19 RX ADMIN — METOPROLOL TARTRATE 12.5 MG: 25 TABLET, FILM COATED ORAL at 20:25

## 2021-10-19 RX ADMIN — MIDODRINE HYDROCHLORIDE 10 MG: 5 TABLET ORAL at 20:25

## 2021-10-19 RX ADMIN — SODIUM CHLORIDE 25 ML: 9 INJECTION, SOLUTION INTRAVENOUS at 10:31

## 2021-10-19 RX ADMIN — ATORVASTATIN CALCIUM 80 MG: 80 TABLET, FILM COATED ORAL at 20:25

## 2021-10-19 ASSESSMENT — PAIN SCALES - WONG BAKER: WONGBAKER_NUMERICALRESPONSE: 0

## 2021-10-19 ASSESSMENT — PAIN SCALES - GENERAL
PAINLEVEL_OUTOF10: 0

## 2021-10-19 ASSESSMENT — PAIN SCALES - PAIN ASSESSMENT IN ADVANCED DEMENTIA (PAINAD)
FACIALEXPRESSION: 0
BREATHING: 1
TOTALSCORE: 1
BODYLANGUAGE: 0
CONSOLABILITY: 0
NEGVOCALIZATION: 0

## 2021-10-19 NOTE — PROGRESS NOTES
PRN albuterol given to patient via HHN. Patient then seemed panicked and started breathing faster up to 28 BPM. Started pulling at the nebulizer near the end and becoming combative. Patient is wheezy, but mainly upper airway wheeze.

## 2021-10-19 NOTE — PLAN OF CARE
Problem: Skin Integrity:  Goal: Will show no infection signs and symptoms  Description: Will show no infection signs and symptoms  Outcome: Ongoing  Goal: Absence of new skin breakdown  Description: Absence of new skin breakdown  Outcome: Ongoing     Problem: Discharge Planning:  Goal: Discharged to appropriate level of care  Description: Discharged to appropriate level of care  Outcome: Ongoing     Problem: Cardiac:  Goal: Hemodynamic stability will improve  Description: Hemodynamic stability will improve  Outcome: Ongoing     Problem: Fluid Volume:  Goal: Will show no signs or symptoms of fluid imbalance  Description: Will show no signs or symptoms of fluid imbalance  Outcome: Ongoing  Goal: Ability to achieve a balanced intake and output will improve  Description: Ability to achieve a balanced intake and output will improve  Outcome: Ongoing     Problem: OXYGENATION/RESPIRATORY FUNCTION  Goal: Patient will maintain patent airway  Outcome: Ongoing  Goal: Patient will achieve/maintain normal respiratory rate/effort  Description: Respiratory rate and effort will be within normal limits for the patient  Outcome: Ongoing     Problem: HEMODYNAMIC STATUS  Goal: Patient has stable vital signs and fluid balance  Outcome: Ongoing     Problem: FLUID AND ELECTROLYTE IMBALANCE  Goal: Fluid and electrolyte balance are achieved/maintained  Outcome: Ongoing     Problem: ACTIVITY INTOLERANCE/IMPAIRED MOBILITY  Goal: Mobility/activity is maintained at optimum level for patient  Outcome: Ongoing     Problem: Physical Regulation:  Goal: Ability to maintain clinical measurements within normal limits will improve  Description: Ability to maintain clinical measurements within normal limits will improve  Outcome: Ongoing  Goal: Will show no signs and symptoms of electrolyte imbalance  Description: Will show no signs and symptoms of electrolyte imbalance  Outcome: Ongoing     Problem: Falls - Risk of:  Goal: Will remain free from Received fax from [facility], looking to fill Atorvastatin 20 MG. According to last note patient was trying to lower Atorvastatin to 10 MG and should of followed up around 04/2020 to re check cholesterol  Please advise on refill    falls  Description: Will remain free from falls  Outcome: Ongoing  Goal: Absence of physical injury  Description: Absence of physical injury  Outcome: Ongoing     Problem: Non-Violent Restraints  Goal: Removal from restraints as soon as assessed to be safe  Outcome: Ongoing  Goal: No harm/injury to patient while restraints in use  Outcome: Ongoing  Goal: Patient's dignity will be maintained  Outcome: Ongoing     Problem: Nutrition  Goal: Optimal nutrition therapy  Outcome: Ongoing

## 2021-10-19 NOTE — PROGRESS NOTES
virus vaccine  0.5 mL IntraMUSCular Prior to discharge     PRN Meds: albuterol, sodium chloride, sodium chloride flush, sodium chloride, ondansetron **OR** ondansetron, polyethylene glycol, acetaminophen **OR** acetaminophen, perflutren lipid microspheres, glucose, glucagon (rDNA), dextrose, dextrose, sodium chloride      Intake/Output Summary (Last 24 hours) at 10/19/2021 0910  Last data filed at 10/19/2021 0448  Gross per 24 hour   Intake 1640 ml   Output 1750 ml   Net -110 ml       Exam:    /76   Pulse 101   Temp 99.3 °F (37.4 °C) (Oral)   Resp 24   Ht 5' 3\" (1.6 m)   Wt 118 lb 9.7 oz (53.8 kg)   LMP  (LMP Unknown)   SpO2 95%   BMI 21.01 kg/m²     General appearance: No apparent distress, appears stated age and cooperative. HEENT: Pupils equal, round, and reactive to light. Conjunctivae/corneas clear. Neck: Supple, with full range of motion. No jugular venous distention. Trachea midline. Respiratory:  Normal respiratory effort. Audible wheeze with decreased breath sounds bilaterally. Mild erythema at dressing site noted. HD catheter line itself without any erythema or discharge   Cardiovascular: Regular rate and rhythm with normal S1/S2 without murmurs, rubs or gallops. Abdomen: Soft, non-tender, non-distended with normal bowel sounds. PEG tube in place  Musculoskelatal: No clubbing, cyanosis or edema bilaterally. Skin: Skin color, texture, turgor normal.  No rashes or lesions. Neurologic:  Cranial nerves: II-XII intact, grossly non-focal.  Psychiatric: Alert and oriented x2    Labs:   Recent Labs     10/17/21  0430 10/17/21  1945 10/18/21  0841 10/18/21  2107 10/19/21  0457   WBC 11.2*  --  9.3  --  8.6   HGB 9.4*   < > 9.0* 9.2* 9.0*   HCT 27.7*   < > 26.7* 27.1* 26.6*     --  196  --  171    < > = values in this interval not displayed.      Recent Labs     10/17/21  0430 10/18/21  0841 10/19/21  0457   * 130* 132*   K 4.4 4.4 3.9   CL 90* 91* 96*   CO2 22 23 23   BUN 63* 68* 37*   CREATININE 2.6* 2.1* 1.5*   CALCIUM 8.4 8.4 8.5   PHOS 4.2  --   --      No results for input(s): AST, ALT, BILIDIR, BILITOT, ALKPHOS in the last 72 hours. No results for input(s): INR in the last 72 hours. No results for input(s): Sepideh Imler in the last 72 hours. Studies:  XR CHEST PORTABLE   Final Result      New airspace disease in the right lung. Correlate for an acute infectious process. CT ABDOMEN PELVIS WO CONTRAST Additional Contrast? None   Final Result      Postsurgical changes are present in the abdomen and pelvis as detailed above. No convincing evidence of an acute GI tract abnormality. Indeterminate hypodense lesion in the right hepatic lobe. Recommend further evaluation with liver MRI with and without contrast.      Bilateral pleural effusions, right greater than left. Right lower lobe consolidation, presumably passive atelectasis. CT Head WO Contrast   Final Result      No acute intracranial hemorrhage or mass effect. Old insult in the brain as detailed above. Cerebral atrophy.              Assessment/Plan:    Active Hospital Problems    Diagnosis Date Noted    Altered mental status [R41.82]     Sepsis ([facility] [address] [A41.9] 10/15/2021     Hypotension likely d/t sepsis (Temp 102.4, hypotension, purulent drainage noted from rectum in ED)  Acute encephalopathy in the setting of prior CVA, dementia, per chart review oriented to self at baseline  Hyponatremia  ESRD on HD  UTI  Acute on chronic anemia  History of septic shock for ischemic colitis and NSTEMI type II in 7/2021  Hx of CVA with R-sided deficits  Dysphagia with PEG tube at baseline  Troponin elevation   CAD s/p CABG 2000  B/l pleural effusions  Iodine allergy  DMII     PLAN:     Acute encephalopathy in the setting of prior CVA, dementia, per chart review oriented to self at baseline  CT head no acute abnormalities  Multiple etiology including sepsis, hyponatremia  Treatment as below     Severe Sepsis, lactic acid > 4  Hx of septic shock with ischemic colitis, purulence noted for rectum per ED providers  CT (10/15) showed some mild strnding around Anna's stump consistent with post-surgical changes  General surgery consulted for possible colitis? . No acute surgical intervention indicated per surgery  Sec to UTI vs PNA vs ?line infection. Has central access. Mild erythema at dressing site. No erythema or discharge or tenderness noted at cathter site  Blood cultures with no growth to date. MRSA negative  Urine culture positive for Candida glabrata   Line culture pending  Will continue vancomycin and meropenem for now  Fluconazole discontinued. Voriconazole added for Candida glabrata UTI    Hypotension  Treatment for sepsis with antibiotics  S/p IV fluids   Continue home midodrine     Hyponatremia  ESRD on HD  S/p IVF  Cont HD  Nephrology following      Acute on chronic anemia, severe  Hgb 5.3 in ED  Per chart review there was a note with concern for black stools though she is on iron therapy. Iron studies showed normal iron/iron saturation, low TIBC and elevated ferritin  S/p 2 units pRBCs  S/p EGD 10/18 which showed multiple erosion within the gastric antrum and body, grade a reflux esophagitis  FOBT pending  Continue Protonix IV BID for now  GI following, appreciate recommendation     Hypoxia  Acute hypoxemic respiratory failure ruled out  B/l pleural effusions on imaging, atelectasis vs PNA  Antibiotics as above  Patient required 2 L on admission.   Off oxygen now     Troponin elevation likely due to demand ischemia in the setting of sepsis and ESRD  CAD s/p CABG 2000  Patient has ST changes that appear similar but more pronounced since her last EKG  Hold anticoagulation due to acute anemia with Hgb of 5  Transfusion goal Hgb >8  Echo showed an EF of 15 to 20% with global dysfunction and grade 2 diastolic dysfunction  Cardiology recommended ischemic evaluation to be done outpatient  Lopressor low dose as BP allows  Continue statin     DMII  Dysphagia with PEG tube at baseline  Monitor blood glucose, elevated  On tube feeds  Sliding scale insulin increased to medium dose    DVT Prophylaxis: SCDs only for now  Diet: Diet NPO Exceptions are: Sips of Water with Meds  ADULT TUBE FEEDING; Nasogastric; Renal Formula; Continuous; 20; Yes; 25; Q 4 hours; 40; 130; Q 4 hours  Code Status: Limited    PT/OT Eval Status: from long term care    Dispo -pending hemoglobin stability, culture result/antibiotic determination    Jeff Pryor MD  Attending Physician  Hospitalist

## 2021-10-19 NOTE — RT PROTOCOL NOTE
order with TID Frequency and one order with Frequency of every 4 hours PRN wheezing or increased work of breathing using Per Protocol order mode. 11-13 - enter or revise RT Bronchodilator order(s) to one equivalent RT bronchodilator order with QID Frequency and an Albuterol order with Frequency of every 4 hours PRN wheezing or increased work of breathing using Per Protocol order mode. Greater than 13 - enter or revise RT Bronchodilator order(s) to one equivalent RT bronchodilator order with every 4 hours Frequency and an Albuterol order with Frequency of every 2 hours PRN wheezing or increased work of breathing using Per Protocol order mode. RT to enter RT Home Evaluation for COPD & MDI Assessment order using Per Protocol order mode.     Electronically signed by Crow Ramirez RCP on 10/19/2021 at 6:24 AM

## 2021-10-19 NOTE — PROGRESS NOTES
Clinical Pharmacy Progress Note    Vancomycin - Management by Pharmacy    Consult Date(s): 10/15  Consulting Provider(s): Dr. Ricky Sage / Plan  1)  Sepsis - Vancomycin   Concurrent Antimicrobials: Meropenem, Voriconazole   Day of Vanc Therapy: #5   Current Dosing Method: Intermittent dosing d/t ESRD on HD   Plan / Rationale:   o Given ESRD on HD, will dose vancomycin based on intermittent doses. Normal HD schedule is MWF.    o Received 750 mg IV x1 with HD yesterday. HD not planned for today - will not give any Vanc today. o Will plan to check next level prior to HD tomorrow AM..  Will continue to monitor clinical condition and make adjustments to regimen as appropriate. Please call with questions--  Thanks--  Micheal Dunn, PharmD, BCPS, BCGP  [id number] ([facility])   10/19/2021 12:12 PM        Interval update:   EGD with no acute bleeding. Urine growing C.glabrata - changed to Voriconazole. Subjective/Objective: Ms. Clarita Maki is a 80 y.o. female with a PMHx significant for ESRD on HD MWF, CAD s/p CABG, DMII, HLD, CVA w/ right-sided deficits, HTN, admission for septic shock in 7/2021 secondary to ischemic bowel, nstemi type 2 due to demand ischemia, dysphagia, anemia, admitted for acute encephalopathy 2/2 sepsis. Pharmacy has been consulted to dose vanomcyin.     Pertinent Antimicrobials:   (10/16-10/18)  Meropenem 1000 mg IV x1, 500 mg IV q24h (10/15-current)  Voriconazole 200mg po BID (10/18-current)  Vancomycin - pharmacy to dose (10/15-current)   Intermittent dosing (10/15-current)   Date Dose Vanc Level   10/15  1250mg   10/16 19.9 mcg/mL --   10/17  --   10/18 13.0 mcg/mL 750mg   10/19  --   10/20 ordered       Ht Readings from Last 1 Encounters:   10/15/21 5' 3\" (1.6 m)     Wt Readings from Last 1 Encounters:   10/18/21 118 lb 9.7 oz (53.8 kg)     Cultures & Sensitivities:  Date Site Micro Susceptibility / Result   10/15 Urine >100k Candida glabrata No further work up 10/15 Blood No growth to date    10/16 MRSA PCR Negative    10/18 Blood In process        Recent Labs     10/17/21  0430 10/18/21  0841 10/19/21  0457   CREATININE 2.6* 2.1* 1.5*   BUN 63* 68* 37*   WBC 11.2* 9.3 8.6

## 2021-10-19 NOTE — PROGRESS NOTES
4 Eyes Admission Assessment     I agree as the admission nurse that 2 RN's have performed a thorough Head to Toe Skin Assessment on the patient. ALL assessment sites listed below have been assessed on admission. Areas assessed by both nurses: Chelsea Mac  [x]   Head, Face, and Ears   [x]   Shoulders, Back, and Chest  [x]   Arms, Elbows, and Hands   [x]   Coccyx, Sacrum, and Ischium  [x]   Legs, Feet, and Heels        Does the Patient have Skin Breakdown?   No         Solitario Prevention initiated:  Yes   Wound Care Orders initiated:  No      [facility] nurse consulted for Pressure Injury (Stage 3,4, Unstageable, DTI, NWPT, and Complex wounds) or Solitario score 18 or lower:  No      Nurse 1 eSignature: Electronically signed by Sol Rosario RN on 10/19/21 at 6:06 AM EDT    **SHARE this note so that the co-signing nurse is able to place an eSignature**    Nurse 2 eSignature: Electronically signed by Abimbola Marcial RN on 10/19/21 at 11:08 PM EDT

## 2021-10-19 NOTE — PROGRESS NOTES
Pt admitted to [address] [facility]. Pt is oriented to self and place, mumbling incomrehensibly. TF running at goal rate of 40ml/hr. NSR on tele. VSS. Fall precautions in place. Will continue to monitor.

## 2021-10-19 NOTE — PROGRESS NOTES
GI Progress Note      Barbie Brady is a 80 y.o. female patient. 1. Altered mental status, unspecified altered mental status type    2. Anemia, unspecified type    3. Acute cystitis without hematuria    4. ESRD needing dialysis [facility])        Admit Date: 10/15/2021    Subjective:       Seen and evaluated this morning. No obvious signs of bleeding. No significant complaints from patient but difficult to assess secondary to patient's poor verbalization. Otherwise per old medical staff no overnight acute events.     ROS:  As per above    Scheduled Meds:   insulin lispro  0-12 Units SubCUTAneous Q6H    pantoprazole  40 mg IntraVENous BID    voriconazole  200 mg Oral 2 times per day    atorvastatin  80 mg Oral Nightly    metoprolol tartrate  12.5 mg Oral BID    sodium chloride flush  5-40 mL IntraVENous 2 times per day    meropenem  500 mg IntraVENous Q24H    midodrine  10 mg PEG Tube TID    vancomycin (VANCOCIN) intermittent dosing (placeholder)   Other See Admin Instructions    influenza virus vaccine  0.5 mL IntraMUSCular Prior to discharge       Continuous Infusions:   sodium chloride      sodium chloride 25 mL (10/19/21 1031)    dextrose      sodium chloride Stopped (10/19/21 1030)       PRN Meds:  albuterol, sodium chloride, sodium chloride flush, sodium chloride, ondansetron **OR** ondansetron, polyethylene glycol, acetaminophen **OR** acetaminophen, perflutren lipid microspheres, glucose, glucagon (rDNA), dextrose, dextrose, sodium chloride      Objective:       Patient Vitals for the past 24 hrs:   BP Temp Temp src Pulse Resp SpO2 Weight   10/19/21 1025 (!) 140/74 -- -- 98 24 97 % --   10/19/21 0558 134/76 99.3 °F (37.4 °C) Oral 101 24 95 % --   10/19/21 0441 139/71 98.2 °F (36.8 °C) Axillary 103 22 97 % --   10/19/21 0030 122/61 98.1 °F (36.7 °C) Axillary -- 24 100 % --   10/18/21 2142 134/69 -- -- 95 -- -- --   10/18/21 2120 134/69 97.8 °F (36.6 °C) Oral 99 24 99 % --   10/18/21 1638 (!) 106/38 97.9 °F (36.6 °C) Axillary 92 20 96 % --   10/18/21 1602 136/81 -- -- 85 24 100 % --   10/18/21 1552 117/67 -- -- 85 24 100 % --   10/18/21 1542 124/89 -- -- 84 20 100 % --   10/18/21 1533 112/68 97.4 °F (36.3 °C) Tympanic 84 18 100 % --   10/18/21 1525 (!) 121/59 97 °F (36.1 °C) Temporal 82 18 100 % --   10/18/21 1148 (!) 106/43 97.7 °F (36.5 °C) Oral 93 20 98 % --   10/18/21 1105 (!) 115/44 97.6 °F (36.4 °C) -- 96 20 98 % 118 lb 9.7 oz (53.8 kg)       Exam:  VITALS:  BP (!) 140/74   Pulse 98   Temp 99.3 °F (37.4 °C) (Oral)   Resp 24   Ht 5' 3\" (1.6 m)   Wt 118 lb 9.7 oz (53.8 kg)   LMP  (LMP Unknown)   SpO2 97%   BMI 21.01 kg/m²   TEMPERATURE:  Current - Temp: 99.3 °F (37.4 °C); Max - Temp  Av.9 °F (36.6 °C)  Min: 97 °F (36.1 °C)  Max: 99.3 °F (37.4 °C)      General appearance: alert, appears stated age, cooperative and no distress  Head: Normocephalic, without obvious abnormality, atraumatic  Neck: supple, symmetrical, trachea midline and thyroid not enlarged, symmetric, no tenderness/mass/nodules  CVS:  RRR, Nl s1s2  Lungs CTA Bilaterally, normal effort  Abdomen: soft, non-tender; bowel sounds normal; no masses,  no organomegaly PEG without blood; ostomy with brown stool  Does not speak  Extremities: No edema. Recent Labs     10/17/21  0430 10/17/21  1945 10/18/21  0841 10/18/21  2107 10/19/21  0457   WBC 11.2*  --  9.3  --  8.6   HGB 9.4*   < > 9.0* 9.2* 9.0*   HCT 27.7*   < > 26.7* 27.1* 26.6*   MCV 94.0  --  95.9  --  95.0     --  196  --  171    < > = values in this interval not displayed. Recent Labs     10/17/21  0430 10/18/21  0841 10/19/21  0457   * 130* 132*   K 4.4 4.4 3.9   CL 90* 91* 96*   CO2 22 23 23   PHOS 4.2  --   --    BUN 63* 68* 37*   CREATININE 2.6* 2.1* 1.5*     No results for input(s): AST, ALT, ALB, BILIDIR, BILITOT, ALKPHOS in the last 72 hours. No results for input(s): LIPASE, AMYLASE in the last 72 hours.   No results for input(s): PROT, INR in the last 72 hours. No results for input(s): PTT in the last 72 hours. No results for input(s): OCCULTBLD in the last 72 hours. Assessment:       Anemia  Grade A reflux esophagitis with no evidence of Swann's     Recommendations:       -Continue PPI daily  -F/U biopsies to evaluate for H. pylori as well as celiac disease.     Will discuss with MD Naresh Garcias DO - PGY1  10/19/2021  10:39 AM

## 2021-10-19 NOTE — PROGRESS NOTES
Patient transferred to [facility]. Bedside report given to RN. VSS. Bed in lowest position, wheels locked, call light within reach.

## 2021-10-19 NOTE — CARE COORDINATION
Presented to [facility] ED with Hypotension and anemia. Pt is from [facility] long term care. Has PEG tube. Pt receives HD at [facility]. SW/CM will follow Pt and assist with DC plan and needs. CM will continue to follow patient until discharge.   Electronically signed by Lilliam Armijo RN on 10/19/2021 at 11:33 AM

## 2021-10-20 LAB
ANION GAP SERPL CALCULATED.3IONS-SCNC: 14 MMOL/L (ref 3–16)
BASOPHILS ABSOLUTE: 0.1 K/UL (ref 0–0.2)
BASOPHILS RELATIVE PERCENT: 0.6 %
BUN BLDV-MCNC: 49 MG/DL (ref 7–20)
CALCIUM SERPL-MCNC: 8.6 MG/DL (ref 8.3–10.6)
CHLORIDE BLD-SCNC: 98 MMOL/L (ref 99–110)
CO2: 23 MMOL/L (ref 21–32)
CREAT SERPL-MCNC: 1.7 MG/DL (ref 0.6–1.2)
EOSINOPHILS ABSOLUTE: 0.3 K/UL (ref 0–0.6)
EOSINOPHILS RELATIVE PERCENT: 2.5 %
GFR AFRICAN AMERICAN: 36
GFR NON-AFRICAN AMERICAN: 30
GLUCOSE BLD-MCNC: 213 MG/DL (ref 70–99)
GLUCOSE BLD-MCNC: 221 MG/DL (ref 70–99)
GLUCOSE BLD-MCNC: 223 MG/DL (ref 70–99)
GLUCOSE BLD-MCNC: 252 MG/DL (ref 70–99)
GLUCOSE BLD-MCNC: 254 MG/DL (ref 70–99)
GLUCOSE BLD-MCNC: 281 MG/DL (ref 70–99)
GLUCOSE BLD-MCNC: 296 MG/DL (ref 70–99)
HCT VFR BLD CALC: 27.2 % (ref 36–48)
HCT VFR BLD CALC: 28 % (ref 36–48)
HEMOGLOBIN: 9.2 G/DL (ref 12–16)
HEMOGLOBIN: 9.4 G/DL (ref 12–16)
LYMPHOCYTES ABSOLUTE: 1.1 K/UL (ref 1–5.1)
LYMPHOCYTES RELATIVE PERCENT: 11 %
MCH RBC QN AUTO: 32.6 PG (ref 26–34)
MCHC RBC AUTO-ENTMCNC: 33.8 G/DL (ref 31–36)
MCV RBC AUTO: 96.2 FL (ref 80–100)
MONOCYTES ABSOLUTE: 1.5 K/UL (ref 0–1.3)
MONOCYTES RELATIVE PERCENT: 14.9 %
NEUTROPHILS ABSOLUTE: 7.1 K/UL (ref 1.7–7.7)
NEUTROPHILS RELATIVE PERCENT: 71 %
PDW BLD-RTO: 19.9 % (ref 12.4–15.4)
PERFORMED ON: ABNORMAL
PLATELET # BLD: 189 K/UL (ref 135–450)
PMV BLD AUTO: 8.4 FL (ref 5–10.5)
POTASSIUM REFLEX MAGNESIUM: 3.9 MMOL/L (ref 3.5–5.1)
RBC # BLD: 2.83 M/UL (ref 4–5.2)
SODIUM BLD-SCNC: 135 MMOL/L (ref 136–145)
VANCOMYCIN RANDOM: 16.3 UG/ML
WBC # BLD: 10 K/UL (ref 4–11)

## 2021-10-20 PROCEDURE — 36415 COLL VENOUS BLD VENIPUNCTURE: CPT

## 2021-10-20 PROCEDURE — C9113 INJ PANTOPRAZOLE SODIUM, VIA: HCPCS | Performed by: INTERNAL MEDICINE

## 2021-10-20 PROCEDURE — 6370000000 HC RX 637 (ALT 250 FOR IP): Performed by: INTERNAL MEDICINE

## 2021-10-20 PROCEDURE — 6370000000 HC RX 637 (ALT 250 FOR IP): Performed by: PHYSICIAN ASSISTANT

## 2021-10-20 PROCEDURE — 5A1D70Z PERFORMANCE OF URINARY FILTRATION, INTERMITTENT, LESS THAN 6 HOURS PER DAY: ICD-10-PCS | Performed by: INTERNAL MEDICINE

## 2021-10-20 PROCEDURE — 85025 COMPLETE CBC W/AUTO DIFF WBC: CPT

## 2021-10-20 PROCEDURE — 85018 HEMOGLOBIN: CPT

## 2021-10-20 PROCEDURE — 6360000002 HC RX W HCPCS: Performed by: INTERNAL MEDICINE

## 2021-10-20 PROCEDURE — 1200000000 HC SEMI PRIVATE

## 2021-10-20 PROCEDURE — 99223 1ST HOSP IP/OBS HIGH 75: CPT | Performed by: INTERNAL MEDICINE

## 2021-10-20 PROCEDURE — 85014 HEMATOCRIT: CPT

## 2021-10-20 PROCEDURE — 90935 HEMODIALYSIS ONE EVALUATION: CPT | Performed by: INTERNAL MEDICINE

## 2021-10-20 PROCEDURE — 80202 ASSAY OF VANCOMYCIN: CPT

## 2021-10-20 PROCEDURE — 90935 HEMODIALYSIS ONE EVALUATION: CPT

## 2021-10-20 PROCEDURE — 80048 BASIC METABOLIC PNL TOTAL CA: CPT

## 2021-10-20 PROCEDURE — 2580000003 HC RX 258: Performed by: INTERNAL MEDICINE

## 2021-10-20 RX ORDER — INSULIN LISPRO 100 [IU]/ML
0-18 INJECTION, SOLUTION INTRAVENOUS; SUBCUTANEOUS EVERY 6 HOURS
Status: DISCONTINUED | OUTPATIENT
Start: 2021-10-20 | End: 2021-10-21 | Stop reason: HOSPADM

## 2021-10-20 RX ORDER — METOPROLOL TARTRATE 5 MG/5ML
12.5 INJECTION INTRAVENOUS ONCE
Status: DISCONTINUED | OUTPATIENT
Start: 2021-10-20 | End: 2021-10-20

## 2021-10-20 RX ORDER — MIDODRINE HYDROCHLORIDE 5 MG/1
5 TABLET ORAL 3 TIMES DAILY
Status: DISCONTINUED | OUTPATIENT
Start: 2021-10-20 | End: 2021-10-21 | Stop reason: HOSPADM

## 2021-10-20 RX ORDER — METOPROLOL SUCCINATE 25 MG/1
25 TABLET, EXTENDED RELEASE ORAL DAILY
Status: DISCONTINUED | OUTPATIENT
Start: 2021-10-21 | End: 2021-10-21 | Stop reason: HOSPADM

## 2021-10-20 RX ADMIN — SODIUM CHLORIDE, PRESERVATIVE FREE 10 ML: 5 INJECTION INTRAVENOUS at 23:18

## 2021-10-20 RX ADMIN — ATORVASTATIN CALCIUM 80 MG: 80 TABLET, FILM COATED ORAL at 20:43

## 2021-10-20 RX ADMIN — MIDODRINE HYDROCHLORIDE 5 MG: 5 TABLET ORAL at 20:43

## 2021-10-20 RX ADMIN — VORICONAZOLE 200 MG: 200 TABLET ORAL at 20:43

## 2021-10-20 RX ADMIN — INSULIN LISPRO 6 UNITS: 100 INJECTION, SOLUTION INTRAVENOUS; SUBCUTANEOUS at 04:42

## 2021-10-20 RX ADMIN — VORICONAZOLE 200 MG: 200 TABLET ORAL at 14:07

## 2021-10-20 RX ADMIN — PANTOPRAZOLE SODIUM 40 MG: 40 INJECTION, POWDER, FOR SOLUTION INTRAVENOUS at 20:43

## 2021-10-20 RX ADMIN — MIDODRINE HYDROCHLORIDE 10 MG: 5 TABLET ORAL at 14:05

## 2021-10-20 RX ADMIN — METOPROLOL TARTRATE 12.5 MG: 25 TABLET, FILM COATED ORAL at 17:56

## 2021-10-20 RX ADMIN — SODIUM CHLORIDE, PRESERVATIVE FREE 10 ML: 5 INJECTION INTRAVENOUS at 08:05

## 2021-10-20 RX ADMIN — MIDODRINE HYDROCHLORIDE 10 MG: 5 TABLET ORAL at 08:08

## 2021-10-20 RX ADMIN — INSULIN LISPRO 9 UNITS: 100 INJECTION, SOLUTION INTRAVENOUS; SUBCUTANEOUS at 17:36

## 2021-10-20 RX ADMIN — INSULIN LISPRO 6 UNITS: 100 INJECTION, SOLUTION INTRAVENOUS; SUBCUTANEOUS at 20:44

## 2021-10-20 RX ADMIN — PANTOPRAZOLE SODIUM 40 MG: 40 INJECTION, POWDER, FOR SOLUTION INTRAVENOUS at 08:09

## 2021-10-20 ASSESSMENT — PAIN SCALES - PAIN ASSESSMENT IN ADVANCED DEMENTIA (PAINAD)
FACIALEXPRESSION: 0
NEGVOCALIZATION: 1
BODYLANGUAGE: 0
TOTALSCORE: 2
CONSOLABILITY: 0
BREATHING: 1

## 2021-10-20 ASSESSMENT — PAIN SCALES - GENERAL
PAINLEVEL_OUTOF10: 0

## 2021-10-20 ASSESSMENT — PAIN SCALES - WONG BAKER: WONGBAKER_NUMERICALRESPONSE: 0

## 2021-10-20 NOTE — CARE COORDINATION
Sw Following, Pt from [facility], [address] spoke w/Thanh [phone] and Pt was there for a Skilled stay NOT LTC, Pt can return at DC, Pt should not need a precert, Pt will need transport, SW will continue to follow for DC needs, anticipate DC tomorrow.     Electronically signed by BETHANIE Macedo, EDSON on 10/20/2021 at 1:29 PM   [phone]

## 2021-10-20 NOTE — PROGRESS NOTES
Clinical Pharmacy Progress Note    Vancomycin - Management by Pharmacy    Consult Date(s): 10/15  Consulting Provider(s): Dr. Ricky Saeg / Plan  1)  Sepsis - Vancomycin   Concurrent Antimicrobials: Meropenem, Voriconazole   Day of Vanc Therapy: 6   Current Dosing Method: Intermittent dosing d/t ESRD on HD MWF  o Therapeutic goal:  Trough > 15mcg/mL. o Random level this AM = 16.3mcg/mL. Will schedule 750mg IV qMWF with dialysis - will get dose with HD today. o Will plan to check next level prior to dialysis on Monday, 10/25 if still on Vancomycin at that time.  Will continue to monitor clinical condition and make adjustments to regimen as appropriate. Please call with questions--  Thanks--  Micheal Dunn, PharmD, BCPS, BCGP  [id number] ([facility])   10/20/2021 8:41 AM        Interval update:   No acute events overnight. Repeat blood culture 10/18 with no growth. Subjective/Objective: Ms. Clarita Maki is a 80 y.o. female with a PMHx significant for ESRD on HD MWF, CAD s/p CABG, DMII, HLD, CVA w/ right-sided deficits, HTN, admission for septic shock in 7/2021 secondary to ischemic bowel, nstemi type 2 due to demand ischemia, dysphagia, anemia, admitted for acute encephalopathy 2/2 sepsis. Pharmacy has been consulted to dose vanomcyin.     Pertinent Antimicrobials:   (10/16-10/18)  Meropenem 1000 mg IV x1, 500 mg IV q24h (10/15-current)  Voriconazole 200mg po BID (10/18-current)  Vancomycin - pharmacy to dose (10/15-current)   Intermittent dosing (10/15-10/20)    750mg IV qMWF with HD (10/20-current)    Date Dose Vanc Level   10/15  1250mg   10/16 19.9 mcg/mL --   10/17  --   10/18 13.0 mcg/mL 750mg   10/19  --   10/20 16.3 mcg/mL       Ht Readings from Last 1 Encounters:   10/15/21 5' 3\" (1.6 m)     Wt Readings from Last 1 Encounters:   10/18/21 118 lb 9.7 oz (53.8 kg)     Cultures & Sensitivities:  Date Site Micro Susceptibility / Result   10/15 Urine >100k Candida glabrata No further work up   10/15 Blood No growth to date    10/16 MRSA PCR Negative    10/18 Blood No growth to date        Recent Labs     10/18/21  0841 10/19/21  0457 10/20/21  0648   CREATININE 2.1* 1.5* 1.7*   BUN 68* 37* 49*   WBC 9.3 8.6 10.0

## 2021-10-20 NOTE — PROGRESS NOTES
Hospitalist Progress Note      PCP: AYDEN Weinberg    Date of Admission: 10/15/2021    Chief Complaint: Altered mental status, hypotension    History Of Present Illness:       Danielle Lee is a 80 y.o. female with past medical history as below, including ESRD on HD, CAD s/p CABG, DMII, HLD, CVA w/ right-sided deficits, HTN, admission for septic shock in 7/2021 secondary to ischemic bowel, nstemi type 2 due to demand ischemia, dysphagia, anemia, reported melena earlier this year per chart review. She was noted to be alert to self with some underlying confusion when she was discharged in July and was not able to communicate any history on last admission to [facility] due to reported baseline mental status.      The patient cannot give any history.      Myself and other staff have tried to reach nursing home multiple times however have not been able to obtain further recent history.       In the ED she was hypotensive, anemic to 5, lactic acid > 4, UA with signs of infection, and during examination also appeared to have some purulent drainage from the rectum. She received 750 ml LR, 1 unit of pRBCs, vancomycin and meropenem. Subjective:   Patient seen in hemodialysis. Alert and oriented x1. Has mumbled speech. Patient follows simple commands  Denies any complaints.     Medications:  Reviewed    Infusion Medications    sodium chloride      sodium chloride Stopped (10/19/21 1120)    dextrose      sodium chloride Stopped (10/19/21 1030)     Scheduled Medications    insulin lispro  0-18 Units SubCUTAneous Q6H    midodrine  5 mg PEG Tube TID    [START ON 10/21/2021] metoprolol succinate  25 mg Oral Daily    metoprolol tartrate  12.5 mg Oral Once    pantoprazole  40 mg IntraVENous BID    voriconazole  200 mg Oral 2 times per day    atorvastatin  80 mg Oral Nightly    sodium chloride flush  5-40 mL IntraVENous 2 times per day    influenza virus vaccine  0.5 mL IntraMUSCular Prior to discharge PRN Meds: albuterol, sodium chloride, sodium chloride flush, sodium chloride, ondansetron **OR** ondansetron, polyethylene glycol, acetaminophen **OR** acetaminophen, perflutren lipid microspheres, glucose, glucagon (rDNA), dextrose, dextrose, sodium chloride      Intake/Output Summary (Last 24 hours) at 10/20/2021 1635  Last data filed at 10/20/2021 1218  Gross per 24 hour   Intake 1320 ml   Output 3331 ml   Net -2011 ml       Exam:    BP (!) 155/81   Pulse 97   Temp 98.6 °F (37 °C) (Oral)   Resp 18   Ht 5' 3\" (1.6 m)   Wt 128 lb 1.4 oz (58.1 kg)   LMP  (LMP Unknown)   SpO2 99%   BMI 22.69 kg/m²     General appearance: No apparent distress, appears stated age and cooperative. HEENT: Pupils equal, round, and reactive to light. Conjunctivae/corneas clear. Neck: Supple, with full range of motion. No jugular venous distention. Trachea midline. Respiratory:  Normal respiratory effort. Audible wheeze with decreased breath sounds bilaterally. Mild erythema at dressing site noted. HD catheter line itself without any erythema or discharge   Cardiovascular: Regular rate and rhythm with normal S1/S2 without murmurs, rubs or gallops. Abdomen: Soft, non-tender, non-distended with normal bowel sounds. PEG tube in place  Musculoskelatal: No clubbing, cyanosis or edema bilaterally. Skin: Skin color, texture, turgor normal.  No rashes or lesions. Neurologic:  Cranial nerves: II-XII intact, grossly non-focal. Has mumbled speech  Psychiatric: Alert and oriented x1    Labs:   Recent Labs     10/18/21  0841 10/18/21  2107 10/19/21  0457 10/19/21  1727 10/20/21  0648   WBC 9.3  --  8.6  --  10.0   HGB 9.0*   < > 9.0* 9.2* 9.2*   HCT 26.7*   < > 26.6* 27.1* 27.2*     --  171  --  189    < > = values in this interval not displayed.      Recent Labs     10/18/21  0841 10/19/21  0457 10/20/21  0648   * 132* 135*   K 4.4 3.9 3.9   CL 91* 96* 98*   CO2 23 23 23   BUN 68* 37* 49*   CREATININE 2.1* 1.5* 1.7* CALCIUM 8.4 8.5 8.6     No results for input(s): AST, ALT, BILIDIR, BILITOT, ALKPHOS in the last 72 hours. No results for input(s): INR in the last 72 hours. No results for input(s): Lisa Height in the last 72 hours. Studies:  XR CHEST PORTABLE   Final Result      New airspace disease in the right lung. Correlate for an acute infectious process. CT ABDOMEN PELVIS WO CONTRAST Additional Contrast? None   Final Result      Postsurgical changes are present in the abdomen and pelvis as detailed above. No convincing evidence of an acute GI tract abnormality. Indeterminate hypodense lesion in the right hepatic lobe. Recommend further evaluation with liver MRI with and without contrast.      Bilateral pleural effusions, right greater than left. Right lower lobe consolidation, presumably passive atelectasis. CT Head WO Contrast   Final Result      No acute intracranial hemorrhage or mass effect. Old insult in the brain as detailed above. Cerebral atrophy.              Assessment/Plan:    Active Hospital Problems    Diagnosis Date Noted    Altered mental status [R41.82]     Sepsis ([facility] [address] [A41.9] 10/15/2021     Hypotension likely d/t sepsis (Temp 102.4, hypotension, purulent drainage noted from rectum in ED)  Acute encephalopathy in the setting of prior CVA, dementia, per chart review oriented to self at baseline  Hyponatremia  ESRD on HD  UTI  Acute on chronic anemia  History of septic shock for ischemic colitis and NSTEMI type II in 7/2021  Hx of CVA with R-sided deficits  Dysphagia with PEG tube at baseline  Troponin elevation   CAD s/p CABG 2000  B/l pleural effusions  Iodine allergy  DMII     PLAN:     Acute encephalopathy in the setting of prior CVA, dementia, per chart review oriented to self at baseline  CT head no acute abnormalities  Multiple etiology including sepsis, hyponatremia  Treatment as below     Severe Sepsis, lactic acid > 4  Hx of septic shock with ischemic colitis, purulence noted for rectum per ED providers  CT (10/15) showed some mild strnding around Anna's stump consistent with post-surgical changes  General surgery consulted for possible colitis? . No acute surgical intervention indicated per surgery  Sec to UTI vs PNA vs ?line infection. Has central access. Mild erythema at dressing site. No erythema or discharge or tenderness noted at cathter site  Blood cultures with no growth to date. MRSA negative  Urine culture positive for Candida glabrata   Line culture with no growth to date  Will discontinue vancomycin and meropenem and monitor off of antibiotics  Fluconazole discontinued. Continue voriconazole for Candida glabrata UTI    Hypotension  Treatment for sepsis with antibiotics  S/p IV fluids   Continue home midodrine     Hyponatremia  ESRD on HD  S/p IVF  Cont HD  Nephrology following      Acute on chronic anemia, severe  Hgb 5.3 in ED  Per chart review there was a note with concern for black stools though she is on iron therapy. Iron studies showed normal iron/iron saturation, low TIBC and elevated ferritin  S/p 2 units pRBCs  S/p EGD 10/18 which showed multiple erosion within the gastric antrum and body, grade a reflux esophagitis  FOBT pending  Continue Protonix IV BID for now  GI following, appreciate recommendation     Hypoxia  Acute hypoxemic respiratory failure ruled out  B/l pleural effusions on imaging, atelectasis vs PNA  Antibiotics as above  Patient required 2 L on admission.   Off oxygen now     Troponin elevation likely due to demand ischemia in the setting of sepsis and ESRD  CAD s/p CABG 2000  Patient has ST changes that appear similar but more pronounced since her last EKG  Hold anticoagulation due to acute anemia with Hgb of 5  Transfusion goal Hgb >8  Echo showed an EF of 15 to 20% with global dysfunction and grade 2 diastolic dysfunction  Cardiology recommended ischemic evaluation to be done outpatient  Lopressor low dose as BP allows  Continue statin  Cardiology following, appreciate recommendation     DMII  Dysphagia with PEG tube at baseline  Monitor blood glucose, elevated  On tube feeds  Sliding scale insulin increased to high dose    DVT Prophylaxis: SCDs only for now  Diet: Diet NPO Exceptions are: Sips of Water with Meds  ADULT TUBE FEEDING; Nasogastric; Renal Formula; Continuous; 20; Yes; 25; Q 4 hours; 40; 130; Q 4 hours  Code Status: Limited    PT/OT Eval Status: from long term care    Dispo -pending hemoglobin stability, cardiology recs    Jaxon Aponte MD  Attending Physician  Hospitalist

## 2021-10-20 NOTE — FLOWSHEET NOTE
Treatment time:3 hours  Net UF: 2131 ml     Pre weight: 58.4 kg   Post weight: 58.1kg  EDW: 58 kg     Access used: RCW CVC  Access function: Good with  ml/min     Medications or blood products given: None     Regular outpatient schedule: [address] 4xweek     Summary of response to treatment: Tolerated tx well. No HD related complaints or complications.      Copy of dialysis treatment record placed in chart, to be scanned into EMR.        10/20/21 0840 10/20/21 1218   Treatment   Time On  --  0840   Time Off  --  1218   Vital Signs   /72 (!) 141/69   Temp 98.7 °F (37.1 °C) 98.8 °F (37.1 °C)   Pulse 93 92   Resp 24 18   Dialysis Bath   K+ (Potassium) 4  --    Ca+ (Calcium) 2.25  --    Na+ (Sodium) 138  --    HCO3 (Bicarb) 32  --

## 2021-10-20 NOTE — PROGRESS NOTES
GI Progress Note      Di Alcantar is a 80 y.o. female patient. 1. Altered mental status, unspecified altered mental status type    2. Anemia, unspecified type    3. Acute cystitis without hematuria    4. ESRD needing dialysis [facility])        Admit Date: 10/15/2021    Subjective:       No overnight acute events per medical staff. Was seen while on hemodialysis today. Continues do well with no major complaints. More orientable today.  No belly pains no other issues at this time    ROS:  As per above    Scheduled Meds:   vancomycin  750 mg IntraVENous Once per day on Mon Wed Fri    insulin lispro  0-12 Units SubCUTAneous Q6H    pantoprazole  40 mg IntraVENous BID    voriconazole  200 mg Oral 2 times per day    atorvastatin  80 mg Oral Nightly    metoprolol tartrate  12.5 mg Oral BID    sodium chloride flush  5-40 mL IntraVENous 2 times per day    meropenem  500 mg IntraVENous Q24H    midodrine  10 mg PEG Tube TID    influenza virus vaccine  0.5 mL IntraMUSCular Prior to discharge       Continuous Infusions:   sodium chloride      sodium chloride Stopped (10/19/21 1120)    dextrose      sodium chloride Stopped (10/19/21 1030)       PRN Meds:  albuterol, sodium chloride, sodium chloride flush, sodium chloride, ondansetron **OR** ondansetron, polyethylene glycol, acetaminophen **OR** acetaminophen, perflutren lipid microspheres, glucose, glucagon (rDNA), dextrose, dextrose, sodium chloride      Objective:       Patient Vitals for the past 24 hrs:   BP Temp Temp src Pulse Resp SpO2   10/20/21 0813 126/78 99.6 °F (37.6 °C) Oral 95 24 93 %   10/20/21 0307 137/78 99.7 °F (37.6 °C) Axillary 93 23 --   10/20/21 0007 (!) 142/85 99.4 °F (37.4 °C) Axillary 97 24 --   10/19/21 2132 (!) 152/81 98.9 °F (37.2 °C) -- 97 -- --   10/19/21 1740 (!) 151/83 98.4 °F (36.9 °C) Oral 97 22 99 %   10/19/21 1340 139/76 98.2 °F (36.8 °C) Oral 92 24 97 %   10/19/21 1107 -- -- -- -- 24 93 %   10/19/21 1025 (!) 140/74 -- -- 98 24 97 %       Exam:  VITALS:  /78   Pulse 95   Temp 99.6 °F (37.6 °C) (Oral)   Resp 24   Ht 5' 3\" (1.6 m)   Wt 118 lb 9.7 oz (53.8 kg)   LMP  (LMP Unknown)   SpO2 93%   BMI 21.01 kg/m²   TEMPERATURE:  Current - Temp: 99.6 °F (37.6 °C); Max - Temp  Av °F (37.2 °C)  Min: 98.2 °F (36.8 °C)  Max: 99.7 °F (37.6 °C)      General appearance: alert, appears stated age, cooperative and no distress  Head: Normocephalic, without obvious abnormality, atraumatic  Neck: supple, symmetrical, trachea midline and thyroid not enlarged, symmetric, no tenderness/mass/nodules  CVS:  RRR, Nl s1s2  Lungs CTA Bilaterally, normal effort  Abdomen: soft, non-tender; bowel sounds normal; no masses,  no organomegaly PEG without blood; ostomy with brown stool  mumbles  Extremities: No edema. Recent Labs     10/18/21  0841 10/18/21  2107 10/19/21  0457 10/19/21  1727 10/20/21  0648   WBC 9.3  --  8.6  --  10.0   HGB 9.0*   < > 9.0* 9.2* 9.2*   HCT 26.7*   < > 26.6* 27.1* 27.2*   MCV 95.9  --  95.0  --  96.2     --  171  --  189    < > = values in this interval not displayed. Recent Labs     10/18/21  0841 10/19/21  0457 10/20/21  0648   * 132* 135*   K 4.4 3.9 3.9   CL 91* 96* 98*   CO2 23 23 23   BUN 68* 37* 49*   CREATININE 2.1* 1.5* 1.7*     No results for input(s): AST, ALT, ALB, BILIDIR, BILITOT, ALKPHOS in the last 72 hours. No results for input(s): LIPASE, AMYLASE in the last 72 hours. No results for input(s): PROT, INR in the last 72 hours. No results for input(s): PTT in the last 72 hours. No results for input(s): OCCULTBLD in the last 72 hours. Assessment:       Anemia  Grade A reflux esophagitis with no evidence of Swann's     Recommendations:       -Continue PPI daily  -F/U biopsies to evaluate for H. pylori as well as celiac disease.     Will discuss with MD Rosalie Cote DO - PGY1  10/20/2021  9:22 AM

## 2021-10-20 NOTE — CONSULTS
Clinical Pharmacy Progress Note    Vancomycin has been discontinued. Will sign off pharmacy to dose Vancomycin consult. If medication is restarted and pharmacy is to manage dosing, please re-consult at that time.     Please call with questions--  Thanks--  Jacey Palmer, PharmD, [address] [facility]  [id number] ([facility])   10/20/2021 9:37 AM

## 2021-10-20 NOTE — PROGRESS NOTES
10/19/21  0457   CALCIUM 8.4 8.4 8.5   MG 2.00  --   --    PHOS 4.2  --   --      Hepatic: No results for input(s): AST, ALT, ALB, BILITOT, ALKPHOS in the last 72 hours. Troponin:   No results for input(s): TROPONINI in the last 72 hours. BNP: No results for input(s): BNP in the last 72 hours. Lipids: No results for input(s): CHOL, TRIG, HDL, LDLCALC, LABVLDL in the last 72 hours. ABGs: No results for input(s): PHART, PO2ART, [id number] in the last 72 hours. INR:   No results for input(s): INR in the last 72 hours. UA:No results for input(s): Sol Prateek, GLUCOSEU, BILIRUBINUR, Arlen [place], BLOODU, PHUR, PROTEINU, UROBILINOGEN, NITRU, LEUKOCYTESUR, LABMICR, URINETYPE in the last 72 hours. Urine Microscopic: No results for input(s): LABCAST, BACTERIA, COMU, HYALCAST, WBCUA, RBCUA, EPIU in the last 72 hours. Urine Culture: No results for input(s): LABURIN in the last 72 hours. Urine Chemistry: No results for input(s): Patsie Number, PROTEINUR, NAUR in the last 72 hours. Objective:   Vitals: BP (!) 151/83   Pulse 97   Temp 98.4 °F (36.9 °C) (Oral)   Resp 22   Ht 5' 3\" (1.6 m)   Wt 118 lb 9.7 oz (53.8 kg)   LMP  (LMP Unknown)   SpO2 99%   BMI 21.01 kg/m²    Wt Readings from Last 3 Encounters:   10/18/21 118 lb 9.7 oz (53.8 kg)   10/07/21 113 lb 5.1 oz (51.4 kg)   08/05/20 95 lb 1.6 oz (43.1 kg)      24HR INTAKE/OUTPUT:      Intake/Output Summary (Last 24 hours) at 10/19/2021 2300  Last data filed at 10/19/2021 1500  Gross per 24 hour   Intake 1190 ml   Output 450 ml   Net 740 ml     IMAGING:  XR CHEST PORTABLE   Final Result      New airspace disease in the right lung. Correlate for an acute infectious process. CT ABDOMEN PELVIS WO CONTRAST Additional Contrast? None   Final Result      Postsurgical changes are present in the abdomen and pelvis as detailed above. No convincing evidence of an acute GI tract abnormality. Indeterminate hypodense lesion in the right hepatic lobe. Recommend further evaluation with liver MRI with and without contrast.      Bilateral pleural effusions, right greater than left. Right lower lobe consolidation, presumably passive atelectasis. CT Head WO Contrast   Final Result      No acute intracranial hemorrhage or mass effect. Old insult in the brain as detailed above. Cerebral atrophy. Assessment and Plan:         Assessment/Plan   1. Hypotension     2. Anemia     3. Acid- base/ Electrolyte imbalance      4.  AMS  __________________________________  - HD MWF   - albumin with HD for low BP   -monitor BP  -minimize IVF  -MBD management  -anemia management          Chiara Smith MD

## 2021-10-20 NOTE — PROGRESS NOTES
Cardiology Consult Service  Daily Progress Note        Admit Date:  10/15/2021  Primary cardiologist: Dr Dm Gibbs / Dr Ivone Mejia     Reason for Consultation/Chief Complaint: Heart Failure     Subjective: Interval history:  The patient was seen bedside during dialysis and her chart was reviewed. Cardiology was consulted for heart failure as most recent echo was remarkable for worsening EF of 15-20%. This morning, the patient was oriented only to self and was not able to answer questions or follow simple commands. Unable to obtain review of systems due to minimal verbal response. She is afebrile, hemodynamically stable and no longer requiring supplemental oxygen. HPI:  Keila Humphreys is a 80year old female with past medical history of CAD s/p CABG and DOT (11/2019), HTN,HLD,ESRD on HD,T2DM, CVA with right sided residual deficits, recent admission for septic shock secondary to ischemic bowel (07/21), NSTEMI-type 2 secondary to demand ischemia,dysphagia,and anemia who presented with altered mental status on 10/15 from the nursing home. At baseline, she has fluctuations in mental status and has days where she has difficulty communicating. In the ED, she was febrile and hypotensive with BP of 105/56 and was given 750 mL LR. On physical exam she was found to have purulent drainage from the rectum and was started on vanc and merrem due to recent history of sepsis secondary to ischemic colitis in the setting of new rectal discharge. Initial EKG was NSR and negative for any acute ischemic changes. She had an elevated troponin of 0.85 which was increasd from her last admission value of 0.04. Initial labs were significant for a hgb of 5.0 (transfused 1 unit pRBC's), lactic acid of 4.3 and a positive UA indicative of a UTI. She also had an elevated pro-BNP of >70,000 and CT findings of bilateral pleural effusions.  She was admitted to medicine for further evaluation and management of sepsis secondary to UTI vs PNA ,acute on chronic anemia, and elevated troponin. On the floor, the patient was started on IVF and IV antibiotics were continued. Blood cultures with no growth to date but urinary culture positive for candida glabrata and voriconazole was started for coverage. She was also re-started on home dose of midodrine 10 mg TID for persistent hypotension. She underwent an EGD with biopsy that showed multiple erosions in the gastric antrum and body with grade a reflux esophagitis.     Echo 10/19/21: Severely reduced LVEF of 15-20% with global dysfunction, G2DD, and reduced RV function    EKG 10/15/2021: NSR with HR 96 bpm, ST and T wave abnormalities, mildly worse compared to prior ST-T wave abnormalities    Echo 12/2019: normal with G1DD    LHC 11/2019: oLM 80%, pCx 90%, mCx 75%, s/p PCI with DOT x 3    Objective:     Medications:   insulin lispro  0-18 Units SubCUTAneous Q6H    pantoprazole  40 mg IntraVENous BID    voriconazole  200 mg Oral 2 times per day    atorvastatin  80 mg Oral Nightly    metoprolol tartrate  12.5 mg Oral BID    sodium chloride flush  5-40 mL IntraVENous 2 times per day    midodrine  10 mg PEG Tube TID    influenza virus vaccine  0.5 mL IntraMUSCular Prior to discharge       IV drips:   sodium chloride      sodium chloride Stopped (10/19/21 1120)    dextrose      sodium chloride Stopped (10/19/21 1030)       PRN:  albuterol, sodium chloride, sodium chloride flush, sodium chloride, ondansetron **OR** ondansetron, polyethylene glycol, acetaminophen **OR** acetaminophen, perflutren lipid microspheres, glucose, glucagon (rDNA), dextrose, dextrose, sodium chloride    Vitals:    10/19/21 2132 10/20/21 0007 10/20/21 0307 10/20/21 0813   BP: (!) 152/81 (!) 142/85 137/78 126/78   Pulse: 97 97 93 95   Resp:  24 23 24   Temp: 98.9 °F (37.2 °C) 99.4 °F (37.4 °C) 99.7 °F (37.6 °C) 99.6 °F (37.6 °C)   TempSrc:  Axillary Axillary Oral   SpO2:    93%   Weight:       Height:           Intake/Output Summary (Last 24 hours) at 10/20/2021 0938  Last data filed at 10/20/2021 0813  Gross per 24 hour   Intake 480 ml   Output 100 ml   Net 380 ml     I/O last 3 completed shifts: In: 480 [NG/GT:480]  Out: 100 [Urine:100]  Wt Readings from Last 3 Encounters:   10/18/21 118 lb 9.7 oz (53.8 kg)   10/07/21 113 lb 5.1 oz (51.4 kg)   08/05/20 95 lb 1.6 oz (43.1 kg)       Admit Wt: Weight: 114 lb (51.7 kg)   Todays Wt: Weight: 118 lb 9.7 oz (53.8 kg)    TELEMETRY personally reviewed: NSR    Physical Exam:         General Appearance:  Oriented only to self. No acute distress. Head:  Normocephalic, without obvious abnormality, atraumatic   Eyes:  PERRL, conjunctiva/corneas clear EOM intact           Nose: Nares normal, no drainage    Throat: Lips, mucosa, and tongue normal   Neck: Supple, symmetrical, trachea midline, no adenopathy, thyroid: not enlarged, symmetric, no tenderness/mass/nodules, no carotid bruit. N       Lungs:   Normal respiratory rate, Mild wheezing heard bilaterally. No rubs or ronchi bilaterally. Chest Wall:  No tenderness or deformity   Heart:  Regular rhythm, rate is controlled, S1, S2 normal, there is no murmur, there is no rub or gallop, cannot assess jvd, no bilateral lower extremity edema   Abdomen:   Soft, non-tender, bowel sounds active all four quadrants,  no masses, no organomegaly       Extremities: Extremities normal, atraumatic, no cyanosis. Pulses: 2+ and symmetric   Skin: Skin color, texture, turgor normal, no rashes or lesions   Pysch: Patient only oriented to self . Minimal verbal response and not able to follow simple commands.             Labs:   Recent Labs     10/18/21  0841 10/19/21  0457 10/20/21  0648   * 132* 135*   K 4.4 3.9 3.9   BUN 68* 37* 49*   CREATININE 2.1* 1.5* 1.7*   CL 91* 96* 98*   CO2 23 23 23   GLUCOSE 117* 239* 252*   CALCIUM 8.4 8.5 8.6     Recent Labs     10/18/21  0841 10/18/21  2107 10/19/21  0457 10/19/21  0457 10/19/21  1727 10/20/21  0648   WBC 9.3 --  8.6  --   --  10.0   HGB 9.0*   < > 9.0*  --  9.2* 9.2*   HCT 26.7*   < > 26.6*  --  27.1* 27.2*     --  171  --   --  189   MCV 95.9  --  95.0   < >  --  96.2    < > = values in this interval not displayed. Imaging:     TTE 19/35/2734: LV systolic function severely reduced with an EF of 15-20%. Global dysfunction though the basal anterolateral wall appears better functioning than other segments. G2DD. Assessment & Plan:     Anamaria Judd is a 80year old female with PMHx of CAD s/p CABG and DOT (11/2019), HTN,HLD,ESRD on HD,T2DM, CVA with right sided residual deficits, recent admission for septic shock secondary to ischemic bowel (07/21),dysphagia,and anemia who presented with altered mental status on 10/15 from the nursing home. Cardiology was consulted for HF evaluation and management.     HFrEF   -Differential Diagnosis includes ischemia vs sepsis vs cardiomyopathy   -Echo (10/19) significant for severely reduced LVEF of 15-20% with global dysfunction ,G2DD and reduced RV function   -Patient appears euvolemic on examination with no evidence of JVD, pulmonary crackles or lower extremity edema appreciated     -BP improved, SBP range 120-140's over past 24 hours  -On lopressor 12.5 mg last dose tonight, switching to Toprol 25 mg daily from tomorrow (10/20/21)   -Will further optimize GDMT as well addition of SGLT2 as an outpatient   -Will consider ICD outpatient if patient does not tolerate GDMT   -On lipitor 80 mg daily  -Anti-coagulation on hold due to acute anemia  -Strict I/O's  -Daily weights, weight on admission 114 lbs with a net gain of 4 lbs during admission;  such fluctuations can be due to fluid shifts during HD   -Wean oxygen as tolerated  -Follow BMP and keep K >4 , Mg >2  -Low sodium diet  -Plan for ischemic work up as an outpatient      CAD s/p CABG and DOT (2019)  -Switched to toprol  25 mg daily (starting 10/20/21)  - On Lipitor 80 mg   -Holding aspirin due to acute anemia     Elevated Troponin   -Troponin 0.81 >0.85 on admission, previous troponin level of 0.04 on 10/2019  -Patient has extensive cardiac history with CABG and stent placement as well as ESRD on HD and presented with severe sepsis signs and symptoms. Elevation in the troponin most likely secondary to NSTEMI Type 2 (demand ischemia). -On lopressor 12.5 mg BID and lipitor 80 mg daily     HTN  -Patient is no longer hypotensive and SBP have improved with SBP >120   -Decreased midodrine from 10 mg to 5 mg  TID per PEG tube     HLD  -On lipitor 80 mg daily     Sepsis Secondary to UTI vs PNA  -Afebrile for the past 24 hours  -Urine culture with growth of candida glabrata    -On voriconazole 200 mg BID   -Finished course of vanc and merrem     Acute on Chronic Anemia   -Hgb stable >9.0   -EGD with biopsies done by GI     T2DM  -Chronic dysphagia and PEG tube   -On medium dose SSI as per primary team       [employer] (MSIV) acting as a scribe for Dr. Florentino Quiroga note by student was edited based on my personal history and exam of the patient. I have personally reviewed the reports and images of labs, radiological studies, cardiac studies including ECG's and telemetry, current and old medical records. The note was completed using EMR. Every effort was made to ensure accuracy; however, inadvertent computerized transcription errors may be present. Danielle Lee is a 80 y.o. female with a past medical history of HTN, HLP, DM, carotid disease, CAD s/p CABG x 3 (LIMA to LAD, SVG to Cx, SVG to OM) in 2000, s/p DOT (11/2019), ESRD on HD, CVA with R sided deficits, sepsis from ischemic bowel in 07/2021.      LHC 11/2019: oLM 80%, pCx 90%, mCx 75%, s/p PCI with DOT x 3.      Echo 67/1773: normal, diastolic I.      Patient was brought to the emergency room on 10/15 with altered mental status and confusion.   Patient was admitted for sepsis due to UTI versus CAP, acute on chronic anemia (hemoglobin 5.3), elevated treatment were discussed.      Lisa Perez MD, [facility], [address]  [address] [address] [address] [address]  Office Phone Number: [phone]

## 2021-10-21 VITALS
TEMPERATURE: 98.1 F | BODY MASS INDEX: 22.7 KG/M2 | HEIGHT: 63 IN | OXYGEN SATURATION: 95 % | RESPIRATION RATE: 20 BRPM | DIASTOLIC BLOOD PRESSURE: 70 MMHG | SYSTOLIC BLOOD PRESSURE: 117 MMHG | WEIGHT: 128.09 LBS | HEART RATE: 98 BPM

## 2021-10-21 LAB
ANION GAP SERPL CALCULATED.3IONS-SCNC: 13 MMOL/L (ref 3–16)
BASOPHILS ABSOLUTE: 0.1 K/UL (ref 0–0.2)
BASOPHILS RELATIVE PERCENT: 0.8 %
BUN BLDV-MCNC: 38 MG/DL (ref 7–20)
CALCIUM SERPL-MCNC: 8.4 MG/DL (ref 8.3–10.6)
CHLORIDE BLD-SCNC: 93 MMOL/L (ref 99–110)
CO2: 21 MMOL/L (ref 21–32)
CREAT SERPL-MCNC: 1.6 MG/DL (ref 0.6–1.2)
EKG ATRIAL RATE: 90 BPM
EKG DIAGNOSIS: NORMAL
EKG P AXIS: 40 DEGREES
EKG P-R INTERVAL: 158 MS
EKG Q-T INTERVAL: 450 MS
EKG QRS DURATION: 94 MS
EKG QTC CALCULATION (BAZETT): 550 MS
EKG R AXIS: 113 DEGREES
EKG T AXIS: -34 DEGREES
EKG VENTRICULAR RATE: 90 BPM
EOSINOPHILS ABSOLUTE: 0.5 K/UL (ref 0–0.6)
EOSINOPHILS RELATIVE PERCENT: 3.8 %
GFR AFRICAN AMERICAN: 39
GFR NON-AFRICAN AMERICAN: 32
GLUCOSE BLD-MCNC: 218 MG/DL (ref 70–99)
GLUCOSE BLD-MCNC: 244 MG/DL (ref 70–99)
GLUCOSE BLD-MCNC: 249 MG/DL (ref 70–99)
GLUCOSE BLD-MCNC: 268 MG/DL (ref 70–99)
GLUCOSE BLD-MCNC: 320 MG/DL (ref 70–99)
HCT VFR BLD CALC: 27.8 % (ref 36–48)
HEMOGLOBIN: 9.1 G/DL (ref 12–16)
LYMPHOCYTES ABSOLUTE: 1.9 K/UL (ref 1–5.1)
LYMPHOCYTES RELATIVE PERCENT: 14.5 %
MCH RBC QN AUTO: 31.8 PG (ref 26–34)
MCHC RBC AUTO-ENTMCNC: 32.9 G/DL (ref 31–36)
MCV RBC AUTO: 96.9 FL (ref 80–100)
MONOCYTES ABSOLUTE: 1.5 K/UL (ref 0–1.3)
MONOCYTES RELATIVE PERCENT: 11.7 %
NEUTROPHILS ABSOLUTE: 8.9 K/UL (ref 1.7–7.7)
NEUTROPHILS RELATIVE PERCENT: 69.2 %
PDW BLD-RTO: 20.3 % (ref 12.4–15.4)
PERFORMED ON: ABNORMAL
PLATELET # BLD: 208 K/UL (ref 135–450)
PMV BLD AUTO: 8.2 FL (ref 5–10.5)
POTASSIUM REFLEX MAGNESIUM: 4.2 MMOL/L (ref 3.5–5.1)
RBC # BLD: 2.87 M/UL (ref 4–5.2)
SODIUM BLD-SCNC: 127 MMOL/L (ref 136–145)
WBC # BLD: 12.8 K/UL (ref 4–11)

## 2021-10-21 PROCEDURE — 99233 SBSQ HOSP IP/OBS HIGH 50: CPT | Performed by: INTERNAL MEDICINE

## 2021-10-21 PROCEDURE — 36415 COLL VENOUS BLD VENIPUNCTURE: CPT

## 2021-10-21 PROCEDURE — 6370000000 HC RX 637 (ALT 250 FOR IP): Performed by: INTERNAL MEDICINE

## 2021-10-21 PROCEDURE — 80048 BASIC METABOLIC PNL TOTAL CA: CPT

## 2021-10-21 PROCEDURE — C9113 INJ PANTOPRAZOLE SODIUM, VIA: HCPCS | Performed by: INTERNAL MEDICINE

## 2021-10-21 PROCEDURE — 6360000002 HC RX W HCPCS: Performed by: INTERNAL MEDICINE

## 2021-10-21 PROCEDURE — 2580000003 HC RX 258: Performed by: INTERNAL MEDICINE

## 2021-10-21 PROCEDURE — 6370000000 HC RX 637 (ALT 250 FOR IP): Performed by: PHYSICIAN ASSISTANT

## 2021-10-21 PROCEDURE — 93010 ELECTROCARDIOGRAM REPORT: CPT | Performed by: INTERNAL MEDICINE

## 2021-10-21 PROCEDURE — 93005 ELECTROCARDIOGRAM TRACING: CPT | Performed by: INTERNAL MEDICINE

## 2021-10-21 PROCEDURE — 85025 COMPLETE CBC W/AUTO DIFF WBC: CPT

## 2021-10-21 RX ORDER — VORICONAZOLE 200 MG/1
200 TABLET, FILM COATED ORAL EVERY 12 HOURS SCHEDULED
Qty: 8 TABLET | Refills: 0 | Status: SHIPPED | OUTPATIENT
Start: 2021-10-21 | End: 2021-10-25

## 2021-10-21 RX ORDER — INSULIN GLARGINE 100 [IU]/ML
10 INJECTION, SOLUTION SUBCUTANEOUS 2 TIMES DAILY
Qty: 10 ML | Refills: 3 | Status: SHIPPED | OUTPATIENT
Start: 2021-10-21

## 2021-10-21 RX ORDER — METOPROLOL SUCCINATE 25 MG/1
25 TABLET, EXTENDED RELEASE ORAL DAILY
Qty: 30 TABLET | Refills: 3 | Status: ON HOLD | OUTPATIENT
Start: 2021-10-22 | End: 2021-11-16 | Stop reason: HOSPADM

## 2021-10-21 RX ORDER — MIDODRINE HYDROCHLORIDE 5 MG/1
5 TABLET ORAL 3 TIMES DAILY
Qty: 90 TABLET | Refills: 3 | Status: SHIPPED | OUTPATIENT
Start: 2021-10-21

## 2021-10-21 RX ADMIN — INSULIN LISPRO 9 UNITS: 100 INJECTION, SOLUTION INTRAVENOUS; SUBCUTANEOUS at 10:05

## 2021-10-21 RX ADMIN — PANTOPRAZOLE SODIUM 40 MG: 40 INJECTION, POWDER, FOR SOLUTION INTRAVENOUS at 09:44

## 2021-10-21 RX ADMIN — MIDODRINE HYDROCHLORIDE 5 MG: 5 TABLET ORAL at 14:07

## 2021-10-21 RX ADMIN — MIDODRINE HYDROCHLORIDE 5 MG: 5 TABLET ORAL at 09:44

## 2021-10-21 RX ADMIN — INSULIN LISPRO 6 UNITS: 100 INJECTION, SOLUTION INTRAVENOUS; SUBCUTANEOUS at 04:49

## 2021-10-21 RX ADMIN — SODIUM CHLORIDE, PRESERVATIVE FREE 10 ML: 5 INJECTION INTRAVENOUS at 09:44

## 2021-10-21 RX ADMIN — INSULIN LISPRO 12 UNITS: 100 INJECTION, SOLUTION INTRAVENOUS; SUBCUTANEOUS at 16:47

## 2021-10-21 RX ADMIN — VORICONAZOLE 200 MG: 200 TABLET ORAL at 09:45

## 2021-10-21 RX ADMIN — METOPROLOL SUCCINATE 25 MG: 25 TABLET, EXTENDED RELEASE ORAL at 09:44

## 2021-10-21 ASSESSMENT — PAIN SCALES - GENERAL
PAINLEVEL_OUTOF10: 0

## 2021-10-21 ASSESSMENT — PAIN SCALES - WONG BAKER
WONGBAKER_NUMERICALRESPONSE: 0

## 2021-10-21 NOTE — PROGRESS NOTES
GI Progress Note      Martin Ray is a 80 y.o. female patient. 1. Altered mental status, unspecified altered mental status type    2. Anemia, unspecified type    3. Acute cystitis without hematuria    4. ESRD needing dialysis ([facility]        Admit Date: 10/15/2021    Subjective:       No overnight acute events per nursing staff. Seen and evaluated this morning. Was resting on bed. No major complaints at this time. Otherwise well.       ROS:  As per above    Scheduled Meds:   insulin lispro  0-18 Units SubCUTAneous Q6H    midodrine  5 mg PEG Tube TID    metoprolol succinate  25 mg Oral Daily    pantoprazole  40 mg IntraVENous BID    voriconazole  200 mg Oral 2 times per day    atorvastatin  80 mg Oral Nightly    sodium chloride flush  5-40 mL IntraVENous 2 times per day    influenza virus vaccine  0.5 mL IntraMUSCular Prior to discharge       Continuous Infusions:   sodium chloride      sodium chloride Stopped (10/19/21 1120)    dextrose      sodium chloride Stopped (10/19/21 1030)       PRN Meds:  albuterol, sodium chloride, sodium chloride flush, sodium chloride, ondansetron **OR** ondansetron, polyethylene glycol, acetaminophen **OR** acetaminophen, perflutren lipid microspheres, glucose, glucagon (rDNA), dextrose, dextrose, sodium chloride      Objective:       Patient Vitals for the past 24 hrs:   BP Temp Temp src Pulse Resp SpO2 Weight   10/21/21 0807 -- -- -- 91 -- -- --   10/21/21 0806 (!) 117/57 98.2 °F (36.8 °C) Oral 91 24 98 % --   10/21/21 0433 133/74 98.3 °F (36.8 °C) Axillary 87 17 100 % --   10/21/21 0125 119/77 98.7 °F (37.1 °C) Oral 83 17 97 % --   10/20/21 2046 117/76 98.1 °F (36.7 °C) Oral 84 18 99 % --   10/20/21 1752 105/69 99.5 °F (37.5 °C) Oral 95 18 100 % --   10/20/21 1255 (!) 155/81 98.6 °F (37 °C) Oral 97 18 99 % --   10/20/21 1218 (!) 141/69 98.8 °F (37.1 °C) -- 92 18 -- 128 lb 1.4 oz (58.1 kg)       Exam:  VITALS:  BP (!) 117/57   Pulse 91   Temp 98.2 °F (36.8 °C) (Oral)   Resp 24 Comment: reported to nurse   5' 3\" (1.6 m)   Wt 128 lb 1.4 oz (58.1 kg)   LMP  (LMP Unknown)   SpO2 98%   BMI 22.69 kg/m²   TEMPERATURE:  Current - Temp: 98.2 °F (36.8 °C); Max - Temp  Av.6 °F (37 °C)  Min: 98.1 °F (36.7 °C)  Max: 99.5 °F (37.5 °C)      General appearance: alert, appears stated age, cooperative and no distress  Head: Normocephalic, without obvious abnormality, atraumatic  Neck: supple, symmetrical, trachea midline and thyroid not enlarged, symmetric, no tenderness/mass/nodules  CVS:  RRR, Nl s1s2  Lungs wheezes heard bilaterally, normal effort  Abdomen: soft, non-tender; bowel sounds normal; no masses,  no organomegaly PEG without blood; ostomy with brown/orange stool  Extremities: No edema. Recent Labs     10/19/21  0457 10/19/21  1727 10/20/21  0648 10/20/21  1727 10/21/21  0640   WBC 8.6  --  10.0  --  12.8*   HGB 9.0*   < > 9.2* 9.4* 9.1*   HCT 26.6*   < > 27.2* 28.0* 27.8*   MCV 95.0  --  96.2  --  96.9     --  189  --  208    < > = values in this interval not displayed. Recent Labs     10/19/21  0457 10/20/21  0648 10/21/21  0640   * 135* 127*   K 3.9 3.9 4.2   CL 96* 98* 93*   CO2 23 23 21   BUN 37* 49* 38*   CREATININE 1.5* 1.7* 1.6*     No results for input(s): AST, ALT, ALB, BILIDIR, BILITOT, ALKPHOS in the last 72 hours. No results for input(s): LIPASE, AMYLASE in the last 72 hours. No results for input(s): PROT, INR in the last 72 hours. No results for input(s): PTT in the last 72 hours. No results for input(s): OCCULTBLD in the last 72 hours. Assessment:       -Anemia  -Grade A reflux esophagitis with no evidence of Swann's   -Suspicion for H pylori gastritis  Gastric biopsy shows morphology features suspicious for H. pylori but not identified on special stains.   Would require serological studies or clinical correlation.  -No evidence of celiac disease or malignancy within small intestinal biopsy    Recommendations: -Continue PPI daily for her anemia. Hemoglobin stable at this time.  -Unlikely h-pylori.  Will hold off on treatment at this time    Will discuss with MD Brandi Padilla DO - PGY1  10/21/2021  9:13 AM

## 2021-10-21 NOTE — CARE COORDINATION
Case Management Assessment            Discharge Note                    Date / Time of Note: 10/21/2021 10:04 AM                  Discharge Note Completed by: BETHANIE Palomino LSW    Patient Name: Jerod Roth   YOB: 1950  Diagnosis: ESRD needing dialysis ([facility] [N18.6, Z99.2]  Acute cystitis without hematuria [N30.00]  Sepsis ([facility] [A41.9]  Altered mental status, unspecified altered mental status type [R41.82]  Anemia, unspecified type [D64.9]   Date / Time: 10/15/2021  8:51 AM    Current PCP: [facility] patient: No    Hospitalization in the last 30 days: Yes    Advance Directives:  Code Status: Limited  [place] DNR form completed and on chart: No    Financial:  Payor: MEDICARE / Plan: MEDICARE PART A AND B / Product Type: *No Product type* /      Pharmacy:    [address] [place] [address] [phone]  [address] [place] [address]  Phone: [phone] Fax: [phone]    [facility] [address], [address] [place] [address] [phone] Stanley Frankel [phone]  [address] [facility] [address]  Phone: [phone] Fax: [place] [address] [address] [phone]  [address] [place] [address] [facility] [address]  Phone: [phone] Fax: [phone]      Assistance purchasing medications?:    Assistance provided by Case Management: None at this time    Does patient want to participate in local refill/ meds to beds program?:      Meds To Beds General Rules:  1. Can ONLY be done Monday- Friday between 8:30am-5pm  2. Prescription(s) must be in pharmacy by 3pm to be filled same day  3. Copy of patient's insurance/ prescription drug card and patient face sheet must be sent along with the prescription(s)  4. Cost of Rx cannot be added to hospital bill.  If financial assistance is needed, please contact unit case manager or ;  or  CANNOT provide pharmacy voucher for patients co-pays  5. Patients can then  the prescription on their way out of the hospital at discharge, or pharmacy can deliver to the bedside if staff is available. (payment due at time of pick-up or delivery - cash, check, or card accepted)     Able to afford home medications/ co-pay costs: Yes    ADLS:  Current PT AM-PAC Score:   /24  Current OT AM-PAC Score:   /24      DISCHARGE Disposition: [facility] (SNF): [place] [address]. Phone: [phone] Fax: [phone]    LOC at discharge: Skilled  [address] Completed: Yes    Notification completed in HENS/PAS?:  Not Applicable    IMM Completed:   Not Indicated    Transportation:  Transportation PLAN for discharge: EMS transportation   Mode of Transport: Ambulance stretcher - BLS  Reason for medical transport: Bed confined: Meets the following criteria 1) unable to get out of bed without assistance or ambulate, 2) unable to safely sit up in a wheelchair, 3) unable to maintain erect seating position in a chair for time needed for transport  Name of Transport Company: [employer]  Phone: [phone]  Time of Transport: 4:00    Transport form completed: Yes    Home Care:  [address] ordered at discharge: No  [address]: Not Applicable  Orders faxed: No    Durable Medical Equipment:  DME Provider: None  Equipment obtained during hospitalization:     Home Oxygen and Respiratory Equipment:  Oxygen needed at discharge?: No  [address]: Not Applicable  Portable tank available for discharge?: Not Indicated    Dialysis:  Dialysis patient: Yes    Dialysis Center:  Receives HD at [facility]    Additional CM Notes: Pt from [place] [address]. SNF, [address] to return to [place] at [facility], [address] spoke w/Thanh [phone] @ [place] and confirmed resumption of care, first care will transport at [place].      The Plan for Transition of Care is related to the following treatment goals of ESRD needing dialysis ([facility] [N18.6, Z99.2]  Acute cystitis without hematuria [N30.00]  Sepsis ([facility] [A41.9]  Altered mental status, unspecified altered mental status type [R41.82]  Anemia, unspecified type [D64.9]    The Patient and/or patient representative Gustavo Christina and her family were provided with a choice of provider and agrees with the discharge plan Yes    Freedom of choice list was provided with basic dialogue that supports the patient's individualized plan of care/goals and shares the quality data associated with the providers.  Yes    Care Transitions patient: No    BETHANIE Hickman, [facility]  Case Management Department  Ph: [phone]  Fax: [phone]

## 2021-10-21 NOTE — PLAN OF CARE
Problem: Skin Integrity:  Goal: Will show no infection signs and symptoms  Description: Will show no infection signs and symptoms  Outcome: Ongoing     Problem: Discharge Planning:  Goal: Discharged to appropriate level of care  Description: Discharged to appropriate level of care  Outcome: Ongoing  Note: Discharge orders placed this morning- will meet with social work. Problem: Fluid Volume:  Goal: Will show no signs or symptoms of fluid imbalance  Description: Will show no signs or symptoms of fluid imbalance  Outcome: Ongoing  Note: Pt with low sodium level this morning- will monitor closely I&o. Problem: OXYGENATION/RESPIRATORY FUNCTION  Goal: Patient will maintain patent airway  Outcome: Ongoing  Note: Pt able to maintain patent airway. Problem: Falls - Risk of:  Goal: Will remain free from falls  Description: Will remain free from falls  Outcome: Ongoing  Note: Discussed with pt importance to keep bed low and locked with alarm activated, nonskid socks on when out of bed, call light and belongings within reach- will monitor.

## 2021-10-21 NOTE — PLAN OF CARE
Problem: Nutrition  Goal: Optimal nutrition therapy  Outcome: Ongoing  Note: Nutrition Problem #1: Inadequate oral intake  Intervention: Food and/or Nutrient Delivery: Continue NPO, Continue Current Tube Feeding  Nutritional Goals: Pt will tolerate EN @ goal rate to meet 100% of needs throughout admission.

## 2021-10-21 NOTE — PROGRESS NOTES
Office : [phone]     Fax :[phone]         Renal Progress Note  Subjective:   Admit Date: 10/15/2021       Pt seen on HD   varinder well   UF as varinder         DIET Diet NPO Exceptions are: Sips of Water with Meds  ADULT TUBE FEEDING; Nasogastric; Renal Formula; Continuous; 20; Yes; 25; Q 4 hours; 40; 130; Q 4 hours  Medications:   Scheduled Meds:   insulin lispro  0-18 Units SubCUTAneous Q6H    midodrine  5 mg PEG Tube TID    [START ON 10/21/2021] metoprolol succinate  25 mg Oral Daily    pantoprazole  40 mg IntraVENous BID    voriconazole  200 mg Oral 2 times per day    atorvastatin  80 mg Oral Nightly    sodium chloride flush  5-40 mL IntraVENous 2 times per day    influenza virus vaccine  0.5 mL IntraMUSCular Prior to discharge     Continuous Infusions:   sodium chloride      sodium chloride Stopped (10/19/21 1120)    dextrose      sodium chloride Stopped (10/19/21 1030)       Labs:  CBC:   Recent Labs     10/18/21  0841 10/18/21  2107 10/19/21  0457 10/19/21  0457 10/19/21  1727 10/20/21  0648 10/20/21  1727   WBC 9.3  --  8.6  --   --  10.0  --    HGB 9.0*   < > 9.0*   < > 9.2* 9.2* 9.4*     --  171  --   --  189  --     < > = values in this interval not displayed.      BMP:    Recent Labs     10/18/21  0841 10/19/21  0457 10/20/21  0648   * 132* 135*   K 4.4 3.9 3.9   CL 91* 96* 98*   CO2 23 23 23   BUN 68* 37* 49*   CREATININE 2.1* 1.5* 1.7*   GLUCOSE 117* 239* 252*     Ca/Mg/Phos:   Recent Labs     10/18/21  0841 10/19/21  0457 10/20/21  0648   CALCIUM 8.4 8.5 8.6     Hepatic: No results for input(s): AST, ALT, ALB, BILITOT, ALKPHOS in the last 72 hours. Troponin:   No results for input(s): TROPONINI in the last 72 hours. BNP: No results for input(s): BNP in the last 72 hours. Lipids: No results for input(s): CHOL, TRIG, HDL, LDLCALC, LABVLDL in the last 72 hours. ABGs: No results for input(s): PHART, PO2ART, NHI2RTA in the last 72 hours. INR:   No results for input(s): INR in the last 72 hours. UA:No results for input(s): Brain Douse, GLUCOSEU, BILIRUBINUR, Yu Otto, BLOODU, PHUR, PROTEINU, UROBILINOGEN, NITRU, LEUKOCYTESUR, LABMICR, URINETYPE in the last 72 hours. Urine Microscopic: No results for input(s): LABCAST, BACTERIA, COMU, HYALCAST, WBCUA, RBCUA, EPIU in the last 72 hours. Urine Culture: No results for input(s): LABURIN in the last 72 hours. Urine Chemistry: No results for input(s): [place], PROTEINUR, NAUR in the last 72 hours. Objective:   Vitals: /76   Pulse 84   Temp 98.1 °F (36.7 °C) (Oral)   Resp 18   Ht 5' 3\" (1.6 m)   Wt 128 lb 1.4 oz (58.1 kg)   LMP  (LMP Unknown)   SpO2 99%   BMI 22.69 kg/m²    Wt Readings from Last 3 Encounters:   10/20/21 128 lb 1.4 oz (58.1 kg)   10/07/21 113 lb 5.1 oz (51.4 kg)   08/05/20 95 lb 1.6 oz (43.1 kg)      24HR INTAKE/OUTPUT:      Intake/Output Summary (Last 24 hours) at 10/20/2021 2358  Last data filed at 10/20/2021 1754  Gross per 24 hour   Intake 3959 ml   Output 3331 ml   Net 628 ml     IMAGING:  XR CHEST PORTABLE   Final Result      New airspace disease in the right lung. Correlate for an acute infectious process. CT ABDOMEN PELVIS WO CONTRAST Additional Contrast? None   Final Result      Postsurgical changes are present in the abdomen and pelvis as detailed above. No convincing evidence of an acute GI tract abnormality. Indeterminate hypodense lesion in the right hepatic lobe. Recommend further evaluation with liver MRI with and without contrast.      Bilateral pleural effusions, right greater than left.  Right lower lobe consolidation, presumably passive atelectasis. CT Head WO Contrast   Final Result      No acute intracranial hemorrhage or mass effect. Old insult in the brain as detailed above. Cerebral atrophy. Assessment and Plan:         Assessment/Plan   1. Hypotension     2. Anemia     3. Acid- base/ Electrolyte imbalance      4.  AMS  __________________________________  - HD MWF   - albumin with HD for low BP   -monitor BP  -minimize IVF  -MBD management  -anemia management          Chiara Smtih MD

## 2021-10-21 NOTE — DISCHARGE INSTR - COC
Continuity of Care Form    Patient Name: Enrique Saxena   :  1950  MRN:  [id number]    Admit date:  10/15/2021  Discharge date:  ***    Code Status Order: Limited   Advance Directives:   [address] [place] [address] Documentation       Date/Time Healthcare Directive Type of Healthcare Directive Copy in [facility] [address] Agent's Name Healthcare Agent's Phone Number    10/18/21 1255  -- limited code  --  --  --  --  --            Admitting Physician:  Vandana Staples DO  PCP: [facility]    Discharging Nurse: [facility] Unit/Room#: 3911/4167-62  Discharging Unit Phone Number: ***    Emergency Contact:   Extended Emergency Contact Information  Primary Emergency Contact: Jaden Arndt   [address] Phone: [phone]  Relation: Child    Past Surgical History:  Past Surgical History:   Procedure Laterality Date    BREAST SURGERY      left breast tumor removed    CARDIAC SURGERY      CABG    CHOLECYSTECTOMY      CORONARY ANGIOPLASTY WITH STENT PLACEMENT      CORONARY ARTERY BYPASS GRAFT      DIAGNOSTIC CARDIAC CATH LAB PROCEDURE      KNEE SURGERY      UPPER GASTROINTESTINAL ENDOSCOPY N/A 10/14/2019    EGD DIAGNOSTIC ONLY performed by Marisela Archer MD at [address] [place] [address] N/A 10/18/2021    EGD BIOPSY performed by Claudette Furrow, MD at [facility] ENDOSCOPY       Immunization History:   Immunization History   Administered Date(s) Administered    Pneumococcal Conjugate 13-valent (Fythwsm44) 2015    Pneumococcal Polysaccharide (Rtkbrwyty69) 2003       Active Problems:  Patient Active Problem List   Diagnosis Code    Peripheral vascular disease ([facility] [address] I73.9    Shortness of breath R06.02    Other chest pain R07.89    Diabetes mellitus ([facility] [address] E11.9    Carotid stenosis I65.29    CAD (coronary artery disease) I25.10    Hyperlipidemia E78.5    Vasovagal syncope R55    Pure hypercholesterolemia E78.00    Status post left heart catheterization Z98.890    Anemia D64.9    NSTEMI (non-ST elevated myocardial infarction) ([facility]) I21.4    CAD S/P percutaneous coronary angioplasty I25.10, Z98.61    Essential hypertension I10    Unstable angina ([facility]) I20.0    Fatigue G16.17    Complication associated with dialysis catheter T82. 9XXA    ESRD (end stage renal disease) ([facility] [address] N18.6    Anemia in ESRD (end-stage renal disease) ([facility]) N18.6, D63.1    Electrolyte imbalance E87.8    Sepsis ([facility]) A41.9    Altered mental status R41.82       Isolation/Infection:   Isolation            No Isolation          Unreconciled Outside Infections       Enable clinical decision support by reconciling outside information with the patient's chart. .      Infection Onset Last Indicated Last Received Source    VRE 09/15/21 09/15/21 09/28/21 [facility] [address]          Patient Infection Status       None to display            Nurse Assessment:  Last Vital Signs: BP (!) 117/57   Pulse 91   Temp 98.2 °F (36.8 °C) (Oral)   Resp 24 Comment: reported to nurse  Ht 5' 3\" (1.6 m)   Wt 128 lb 1.4 oz (58.1 kg)   LMP  (LMP Unknown)   SpO2 98%   BMI 22.69 kg/m²     Last documented pain score (0-10 scale): Pain Level: 0  Last Weight:   Wt Readings from Last 1 Encounters:   10/20/21 128 lb 1.4 oz (58.1 kg)     Mental Status:  disoriented    IV Access:  - Dialysis Catheter  - site  right and subclavian, insertion date: unknown    Nursing Mobility/ADLs:  Walking   Dependent  Transfer  Dependent  Bathing  Dependent  Dressing  Dependent  Toileting  Dependent  Feeding  Dependent  Med Admin  Dependent  Med Delivery   crushed    Wound Care Documentation and Therapy:        Elimination:  Continence:    Bowel: ***  Bladder: {YES / CL:78849}  Urinary Catheter: None   Colostomy/Ileostomy/Ileal Conduit: Yes  Colostomy RLQ-Stomal Appliance: 1 piece, Clean, Dry, Intact, Other (Comment) (emptied colostomy)  Colostomy RLQ-Stoma  Assessment: Pink, Protrudes  Colostomy RLQ-Mucocutaneous Junction: Intact  Colostomy RLQ-Peristomal Assessment: Clean, Intact  Colostomy RLQ-Stool Appearance: Loose  Colostomy RLQ-Stool Color: Brown  Colostomy RLQ-Stool Amount: Medium  Colostomy RLQ-Output (mL): 100 ml    Date of Last BM: ***    Intake/Output Summary (Last 24 hours) at 10/21/2021 0953  Last data filed at 10/20/2021 1754  Gross per 24 hour   Intake 3839 ml   Output 3331 ml   Net 508 ml     I/O last 3 completed shifts: In: 9998 [P.O.:120; NG/GT:2639]  Out: 3331     Safety Concerns: At Risk for Falls    Impairments/Disabilities:      Contractures - left upper is a little contracted    Nutrition Therapy:  Current Nutrition Therapy:   - Tube Feedings:  Renal    Routes of Feeding: Oral and sips water/ice chips  Liquids: No Liquids  Daily Fluid Restriction: no  Last Modified Barium Swallow with Video (Video Swallowing Test): done on 10/06/2021    Treatments at the Time of Hospital Discharge:   Respiratory Treatments: q6 prn nebs  Oxygen Therapy:  is not on home oxygen therapy.   Ventilator:    - No ventilator support    Rehab Therapies: Physical Therapy, Occupational Therapy and Speech/Language Therapy  Weight Bearing Status/Restrictions: No weight bearing restirctions  Other Medical Equipment (for information only, NOT a DME order):  wheelchair and hospital bed  Other Treatments: ***    Patient's personal belongings (please select all that are sent with patient):  None    RN SIGNATURE:  Electronically signed by Meng Barrientos RN on 10/21/21 at 3:01 PM EDT    CASE MANAGEMENT/SOCIAL WORK SECTION    Inpatient Status Date: ***    Readmission Risk Assessment Score:  Readmission Risk              Risk of Unplanned Readmission:  30           Discharging to Facility/ [address] [address]       Phone: [phone]       Fax: [phone]            / signature: Electronically signed by Christiane Shah MSW, AISHAW on 10/21/21 at 10:05 AM EDT    PHYSICIAN SECTION    Prognosis: Fair    Condition at Discharge: Stable    Rehab Potential (if transferring to Rehab): NA    Recommended Labs or Other Treatments After Discharge: Monitor vitals and blood glucose. Adjust midodrine/antihypertensives and insulin as needed. Complete abx course. Follow up with cardiology, nephrology and PCP. PT/OT    Physician Certification: I certify the above information and transfer of Jeanette Porras  is necessary for the continuing treatment of the diagnosis listed and that she requires [facility] for greater 30 days.      Update Admission H&P: No change in H&P    PHYSICIAN SIGNATURE:  Electronically signed by Tremaine Larsen MD on 10/21/21 at 9:54 AM EDT

## 2021-10-21 NOTE — DISCHARGE SUMMARY
Hospital Medicine Discharge Summary    Patient ID: Modesta Riddle      Patient's PCP: Myrtle Burris    Admit Date: 10/15/2021     Discharge Date:  10/21/21    Admitting Physician: Esme Gallegos DO     Discharge Physician: Yusuf Pantoja MD     Discharge Diagnoses: Active Hospital Problems    Diagnosis Date Noted    Altered mental status [R41.82]     Sepsis ([address] [A41.9] 10/15/2021       The patient was seen and examined on day of discharge and this discharge summary is in conjunction with any daily progress note from day of discharge. Hospital Course:   Patient was admitted and treated for following:    #Acute encephalopathy in the setting of prior CVA, dementia  Was likely secondary to sepsis and hyponatremia. Per chart review oriented to self at baseline. CT head showed no acute abnormality. Understates was monitored and improved to baseline. #Severe Sepsis, lactic acid > 4 secondary to UTI  Patient had Hx of septic shock with ischemic colitis, purulence noted for rectum per ED providers. CT (10/15) showed some mild strnding around Anna's stump consistent with post-surgical changes. Cultures were sent. Patient was started on empiric antibiotic. General surgery was consulted for possible colitis. Patient has central access. Mild erythema was noted at dressing site. No erythema or discharge or tenderness noted at cathter site. Blood culture and line culture came back negative. Surgery recommended no acute surgical intervention. Urine culture came back positive for Candida glabrata. IV vancomycin/Merrem was discontinued. Fluconazole was started which was changed to voriconazole due to Candida glabrata UTI. Sepsis resolved. Patient completed voriconazole course on discharge. #Hypotension  Patient was given IV fluids. Was continued on home midodrine. Hypotension resolved with treatment of sepsis.     #Hyponatremia  #ESRD on HD  Patient was given fluids. Nephrology was consulted. Sodium was monitored. Hemodialysis was continued. Tube feeds were continued with water flushes. Sodium level dropped. Water flushes were reduced. Patient follow-up with nephrology outpatient.     #Acute on chronic anemia, severe sec to gastric erosions  Hgb was 5.3 in ED. Per chart review there was a note with concern for black stools though she is on iron therapy. Aspirin was held. Patient was given blood transfusion. GI was consulted. Iron studies and fecal occult blood test was ordered. Patient received 2 units of packed red blood cell. Iron studies showed normal iron/iron saturation, low TIBC and elevated ferritin. Patient underwent EGD 10/18 which showed multiple erosion within the gastric antrum and body, grade a reflux esophagitis. Hemoglobin was monitored and remained stable. GI recommended continuing PPI. Aspirin resumed on discharge     #Hypoxia/Acute hypoxemic respiratory failure ruled out  Patient was placed on 2 L oxygen. X-ray showed bilateral pleural effusion, atelectasis versus pneumonia. Patient was placed on antibiotic. Sats were monitored. Patient was able to be weaned off of oxygen.      #Troponin elevation likely due to demand ischemia in the setting of sepsis and ESRD  #CAD s/p CABG 2000  Patient had ST changes that appeared similar but more pronounced since her last EKG. cardiology was consulted. Aspirin was held due to anemia. Patient was given blood transfusion. Echo showed an EF of 15 to 20% with global dysfunction and grade 2 diastolic dysfunction. Patient was continued on low-dose Lopressor and statin. Cardiology recommended outpatient ischemic evaluation. Aspirin resumed on discharge. Patient follow-up with cardiology.     #DMII  #Dysphagia with PEG tube at baseline  Blood glucose was monitored. Tube feeds were continued per dietitian recommendation. Patient was given sliding scale insulin and insulin was adjusted based on blood glucose level.  Patient continued on tube feeds with insulin hepatic lobe. Recommend further evaluation with liver MRI with and without contrast.      Bilateral pleural effusions, right greater than left. Right lower lobe consolidation, presumably passive atelectasis. CT Head WO Contrast   Final Result      No acute intracranial hemorrhage or mass effect. Old insult in the brain as detailed above. Cerebral atrophy. Consults:     PHARMACY TO DOSE VANCOMYCIN  IP CONSULT TO SOCIAL WORK  IP CONSULT TO NEPHROLOGY  IP CONSULT TO HOSPITALIST  IP CONSULT TO GENERAL SURGERY  IP CONSULT TO CARDIOLOGY  IP CONSULT TO PALLIATIVE CARE  IP CONSULT TO PHARMACY  IP CONSULT TO GI  PHARMACY TO DOSE VANCOMYCIN  IP CONSULT TO DIETITIAN  IP CONSULT TO CARDIOLOGY    Disposition:  SNF    Condition at Discharge: Stable    Discharge Instructions/Follow-up:  Monitor vitals and blood glucose. Adjust midodrine/antihypertensives and insulin as needed. Complete abx course. Follow up with cardiology, nephrology and PCP.  PT/OT    Code Status:  Limited     Activity: activity as tolerated    Diet: tube feed      Discharge Medications:     Current Discharge Medication List           Details   voriconazole (VFEND) 200 MG tablet Take 1 tablet by mouth every 12 hours for 4 days  Qty: 8 tablet, Refills: 0      metoprolol succinate (TOPROL XL) 25 MG extended release tablet Take 1 tablet by mouth daily  Qty: 30 tablet, Refills: 3              Details   insulin glargine (SEMGLEE) 100 UNIT/ML injection vial Inject 10 Units into the skin 2 times daily  Qty: 10 mL, Refills: 3      midodrine (PROAMATINE) 5 MG tablet 1 tablet by PEG Tube route 3 times daily  Qty: 90 tablet, Refills: 3              Details   acetaminophen (TYLENOL) 325 MG tablet 650 mg by Per G Tube route every 4 hours as needed for Pain or Fever      insulin lispro (HUMALOG) 100 UNIT/ML injection vial Inject 0-10 Units into the skin every 6 hours Per Sliding scale      aspirin 81 MG chewable tablet 81 mg by Per G Tube route daily      vitamin B complex (NATURES BLEND B COMPLEX) TABS tablet 1 tablet by Per G Tube route daily      famotidine (PEPCID) 10 MG tablet 10 mg by Per G Tube route daily For GERD for 30 days. atorvastatin (LIPITOR) 80 MG tablet 80 mg by Per G Tube route nightly      OLANZapine (ZYPREXA) 10 MG tablet 10 mg by Per G Tube route nightly      valproic acid (DEPAKENE) 250 MG/5ML SOLN oral solution 130 mg by Per G Tube route 3 times daily      gabapentin (NEURONTIN) 100 MG capsule Take 100 mg by mouth 3 times daily. Time Spent on discharge is more than 30 minutes in the examination, evaluation, counseling and review of medications and discharge plan. Signed:    Anatoliy Nava MD   10/21/2021      Thank you [facility] for the opportunity to be involved in this patient's care. If you have any questions or concerns please feel free to contact me at [phone].

## 2021-10-21 NOTE — PROGRESS NOTES
Comprehensive Nutrition Assessment    RECOMMENDATIONS:  1. PO Diet: Continue NPO per MD   2. Continue EN formula Renal  Nepro  @ goal rate 40 ml/hr to provide 960 ml total volume, 1700 kcal, 77 g protein and 700 ml free water. 3. Reduce free water bolus to maintenance flush of 30 ml every 4 hours until Na WNL. NUTRITION ASSESSMENT:   Type and Reason for Visit:  Type and Reason for Visit: Reassess   Nutritional summary & status: Nutritionally dependent on EN for primary nutrition. At goal rate to meet nutrition needs at this time, as she remains NPO. Noted Na 127 today w/IVF off and free water flushes @130 ml q4 hr. RD will reduce to maintence flushes until Na WNL. Notified MD per perfect serve     Patient admitted d/t AMS, hypotension    PMH significant for: ESRD on HD, CAD s/p CABG, DMII, HLD, CVA w/ right-sided deficits, HTN    MALNUTRITION ASSESSMENT  Context of Malnutrition: Acute Illness   Malnutrition Status: No malnutrition    NUTRITION DIAGNOSIS   · Inadequate oral intake related to cognitive or neurological impairment as evidenced by NPO or clear liquid status due to medical condition, nutrition support - enteral nutrition    NUTRITION INTERVENTION  Food and/or Nutrient Delivery:  Continue NPO, Continue Current Tube Feeding  Nutrition Education/Counseling:  No recommendation at this time   Goals:  Pt will tolerate EN @ goal rate to meet 100% of needs throughout adm until able to consume po diet. Nutrition Monitoring and Evaluation:   Food/Nutrient Intake Outcomes:  Enteral Nutrition Intake/Tolerance  Physical Signs/Symptoms Outcomes:  Biochemical Data, GI Status, Weight     OBJECTIVE DATA: Significant to nutrition assessment  · Nutrition-Focused Physical Findings: Nutrition Related Findings: colostomy output 100 ml in last 24 hours; PEG; AMS at baseline; unable to provide any hx.     · Labs: Reviewed; A1C 6.0 (10/15/21), Na 127  · Meds: Reviewed;   · Wounds: Wound Type: None     CURRENT

## 2021-10-21 NOTE — PROGRESS NOTES
Report given to neol. Iv and telemetry removed. Belongings will be sent with pt. Awaiting 1600 transportation. Pt stable for discharge.

## 2021-10-22 LAB — BLOOD CULTURE, ROUTINE: NORMAL

## 2021-10-22 NOTE — PROGRESS NOTES
Office : [phone]     Fax :[phone]         Renal Progress Note  Subjective:   Admit Date: 10/15/2021       S/p HD yesterday   Doing some better   More awake         DIET No diet orders on file  Medications:   Scheduled Meds:    Continuous Infusions:      Labs:  CBC:   Recent Labs     10/19/21  0457 10/19/21  1727 10/20/21  0648 10/20/21  1727 10/21/21  0640   WBC 8.6  --  10.0  --  12.8*   HGB 9.0*   < > 9.2* 9.4* 9.1*     --  189  --  208    < > = values in this interval not displayed. BMP:    Recent Labs     10/19/21  0457 10/20/21  0648 10/21/21  0640   * 135* 127*   K 3.9 3.9 4.2   CL 96* 98* 93*   CO2 23 23 21   BUN 37* 49* 38*   CREATININE 1.5* 1.7* 1.6*   GLUCOSE 239* 252* 244*     Ca/Mg/Phos:   Recent Labs     10/19/21  0457 10/20/21  0648 10/21/21  0640   CALCIUM 8.5 8.6 8.4     Hepatic: No results for input(s): AST, ALT, ALB, BILITOT, ALKPHOS in the last 72 hours. Troponin:   No results for input(s): TROPONINI in the last 72 hours. BNP: No results for input(s): BNP in the last 72 hours. Lipids: No results for input(s): CHOL, TRIG, HDL, LDLCALC, LABVLDL in the last 72 hours. ABGs: No results for input(s): PHART, PO2ART, HBQ1NDW in the last 72 hours. INR:   No results for input(s): INR in the last 72 hours. UA:No results for input(s): Brain Douse, GLUCOSEU, BILIRUBINUR, Yu Otto, BLOODU, PHUR, PROTEINU, UROBILINOGEN, NITRU, LEUKOCYTESUR, LABMICR, URINETYPE in the last 72 hours. Urine Microscopic: No results for input(s): LABCAST, BACTERIA, COMU, HYALCAST, WBCUA, RBCUA, EPIU in the last 72 hours. Urine Culture:  No results for input(s): Gracy Grubbs in the last 72 hours. Urine Chemistry: No results for input(s): Valene Ford, PROTEINUR, NAUR in the last 72 hours. Objective:   Vitals: /70   Pulse 98   Temp 98.1 °F (36.7 °C) (Oral)   Resp 20   Ht 5' 3\" (1.6 m)   Wt 128 lb 1.4 oz (58.1 kg)   LMP  (LMP Unknown)   SpO2 95%   BMI 22.69 kg/m²    Wt Readings from Last 3 Encounters:   10/20/21 128 lb 1.4 oz (58.1 kg)   10/07/21 113 lb 5.1 oz (51.4 kg)   08/05/20 95 lb 1.6 oz (43.1 kg)      24HR INTAKE/OUTPUT:      Intake/Output Summary (Last 24 hours) at 10/21/2021 2303  Last data filed at 10/21/2021 1732  Gross per 24 hour   Intake 2595 ml   Output --   Net 2595 ml     IMAGING:  XR CHEST PORTABLE   Final Result      New airspace disease in the right lung. Correlate for an acute infectious process. CT ABDOMEN PELVIS WO CONTRAST Additional Contrast? None   Final Result      Postsurgical changes are present in the abdomen and pelvis as detailed above. No convincing evidence of an acute GI tract abnormality. Indeterminate hypodense lesion in the right hepatic lobe. Recommend further evaluation with liver MRI with and without contrast.      Bilateral pleural effusions, right greater than left. Right lower lobe consolidation, presumably passive atelectasis. CT Head WO Contrast   Final Result      No acute intracranial hemorrhage or mass effect. Old insult in the brain as detailed above. Cerebral atrophy. Assessment and Plan:         Assessment/Plan   1. Hypotension     2. Anemia     3. Acid- base/ Electrolyte imbalance      4.  AMS  __________________________________  - HD MWF   - albumin with HD for low BP   -monitor BP  -minimize IVF  -MBD management  -anemia management          Sal Venegas MD

## 2021-11-13 ENCOUNTER — APPOINTMENT (OUTPATIENT)
Dept: GENERAL RADIOLOGY | Age: 71
DRG: 689 | End: 2021-11-13
Payer: MEDICARE

## 2021-11-13 ENCOUNTER — HOSPITAL ENCOUNTER (EMERGENCY)
Age: 71
Discharge: HOME | DRG: 689 | End: 2021-11-13
Attending: STUDENT IN AN ORGANIZED HEALTH CARE EDUCATION/TRAINING PROGRAM
Payer: MEDICARE

## 2021-11-13 VITALS
DIASTOLIC BLOOD PRESSURE: 62 MMHG | BODY MASS INDEX: 23.03 KG/M2 | SYSTOLIC BLOOD PRESSURE: 126 MMHG | OXYGEN SATURATION: 93 % | TEMPERATURE: 98 F | RESPIRATION RATE: 18 BRPM | HEART RATE: 86 BPM | WEIGHT: 130 LBS

## 2021-11-13 DIAGNOSIS — K94.20 PROBLEM WITH GASTROSTOMY TUBE (HCC): Primary | ICD-10-CM

## 2021-11-13 PROCEDURE — 99284 EMERGENCY DEPT VISIT MOD MDM: CPT

## 2021-11-13 PROCEDURE — 74018 RADEX ABDOMEN 1 VIEW: CPT

## 2021-11-13 PROCEDURE — 6360000004 HC RX CONTRAST MEDICATION: Performed by: STUDENT IN AN ORGANIZED HEALTH CARE EDUCATION/TRAINING PROGRAM

## 2021-11-13 RX ADMIN — DIATRIZOATE MEGLUMINE AND DIATRIZOATE SODIUM 30 ML: 660; 100 LIQUID ORAL; RECTAL at 19:16

## 2021-11-13 NOTE — ED NOTES
G-tube clog buster placed in G-tube by MD.     Veronique Grubbs, RN  11/13/21 5323 Principal Discharge DX:	Rectal bleeding

## 2021-11-14 ENCOUNTER — APPOINTMENT (OUTPATIENT)
Dept: GENERAL RADIOLOGY | Age: 71
DRG: 689 | End: 2021-11-14
Payer: MEDICARE

## 2021-11-14 ENCOUNTER — APPOINTMENT (OUTPATIENT)
Dept: CT IMAGING | Age: 71
DRG: 689 | End: 2021-11-14
Payer: MEDICARE

## 2021-11-14 ENCOUNTER — HOSPITAL ENCOUNTER (INPATIENT)
Age: 71
LOS: 2 days | Discharge: NURSING, REHAB OR LONG-TERM CARE | DRG: 689 | End: 2021-11-16
Attending: STUDENT IN AN ORGANIZED HEALTH CARE EDUCATION/TRAINING PROGRAM | Admitting: STUDENT IN AN ORGANIZED HEALTH CARE EDUCATION/TRAINING PROGRAM
Payer: MEDICARE

## 2021-11-14 DIAGNOSIS — N39.0 URINARY TRACT INFECTION WITHOUT HEMATURIA, SITE UNSPECIFIED: Primary | ICD-10-CM

## 2021-11-14 LAB
ALBUMIN SERPL-MCNC: 3.2 G/DL (ref 3.4–5)
ALP BLD-CCNC: 143 U/L (ref 40–129)
ALT SERPL-CCNC: 25 U/L (ref 10–40)
ANION GAP SERPL CALCULATED.3IONS-SCNC: 13 MMOL/L (ref 3–16)
AST SERPL-CCNC: 22 U/L (ref 15–37)
BACTERIA: ABNORMAL /HPF
BASE EXCESS VENOUS: 10.1 MMOL/L (ref -2–3)
BASOPHILS ABSOLUTE: 0.1 K/UL (ref 0–0.2)
BASOPHILS RELATIVE PERCENT: 0.6 %
BILIRUB SERPL-MCNC: 0.3 MG/DL (ref 0–1)
BILIRUBIN DIRECT: <0.2 MG/DL (ref 0–0.3)
BILIRUBIN URINE: NEGATIVE
BILIRUBIN, INDIRECT: ABNORMAL MG/DL (ref 0–1)
BLOOD, URINE: ABNORMAL
BUN BLDV-MCNC: 55 MG/DL (ref 7–20)
CALCIUM SERPL-MCNC: 9.1 MG/DL (ref 8.3–10.6)
CARBOXYHEMOGLOBIN: 1.3 % (ref 0–1.5)
CHLORIDE BLD-SCNC: 94 MMOL/L (ref 99–110)
CLARITY: ABNORMAL
CO2: 30 MMOL/L (ref 21–32)
COLOR: YELLOW
CREAT SERPL-MCNC: 1.3 MG/DL (ref 0.6–1.2)
EOSINOPHILS ABSOLUTE: 0.1 K/UL (ref 0–0.6)
EOSINOPHILS RELATIVE PERCENT: 0.8 %
EPITHELIAL CELLS, UA: ABNORMAL /HPF (ref 0–5)
GFR AFRICAN AMERICAN: 49
GFR NON-AFRICAN AMERICAN: 40
GLUCOSE BLD-MCNC: 194 MG/DL (ref 70–99)
GLUCOSE BLD-MCNC: 236 MG/DL (ref 70–99)
GLUCOSE URINE: NEGATIVE MG/DL
HCO3 VENOUS: 35.3 MMOL/L (ref 24–28)
HCT VFR BLD CALC: 24.5 % (ref 36–48)
HEMOGLOBIN, VEN, REDUCED: 65.7 %
HEMOGLOBIN: 8.3 G/DL (ref 12–16)
KETONES, URINE: NEGATIVE MG/DL
LACTIC ACID, SEPSIS: 1.6 MMOL/L (ref 0.4–1.9)
LACTIC ACID, SEPSIS: 2.3 MMOL/L (ref 0.4–1.9)
LEUKOCYTE ESTERASE, URINE: ABNORMAL
LIPASE: 101 U/L (ref 13–60)
LYMPHOCYTES ABSOLUTE: 1 K/UL (ref 1–5.1)
LYMPHOCYTES RELATIVE PERCENT: 11.5 %
MAGNESIUM: 2.3 MG/DL (ref 1.8–2.4)
MCH RBC QN AUTO: 33 PG (ref 26–34)
MCHC RBC AUTO-ENTMCNC: 33.9 G/DL (ref 31–36)
MCV RBC AUTO: 97.4 FL (ref 80–100)
METHEMOGLOBIN VENOUS: 0 % (ref 0–1.5)
MICROSCOPIC EXAMINATION: YES
MONOCYTES ABSOLUTE: 1.6 K/UL (ref 0–1.3)
MONOCYTES RELATIVE PERCENT: 17.7 %
NEUTROPHILS ABSOLUTE: 6.2 K/UL (ref 1.7–7.7)
NEUTROPHILS RELATIVE PERCENT: 69.4 %
NITRITE, URINE: NEGATIVE
O2 SAT, VEN: 33 %
PCO2, VEN: 51.1 MMHG (ref 41–51)
PDW BLD-RTO: 18.9 % (ref 12.4–15.4)
PERFORMED ON: ABNORMAL
PH UA: 6 (ref 5–8)
PH VENOUS: 7.45 (ref 7.35–7.45)
PLATELET # BLD: 172 K/UL (ref 135–450)
PMV BLD AUTO: 9.6 FL (ref 5–10.5)
PO2, VEN: <30 MMHG (ref 25–40)
POTASSIUM REFLEX MAGNESIUM: 3.2 MMOL/L (ref 3.5–5.1)
PRO-BNP: ABNORMAL PG/ML (ref 0–124)
PROTEIN UA: 100 MG/DL
RBC # BLD: 2.52 M/UL (ref 4–5.2)
RBC UA: ABNORMAL /HPF (ref 0–4)
SODIUM BLD-SCNC: 137 MMOL/L (ref 136–145)
SPECIFIC GRAVITY UA: 1.01 (ref 1–1.03)
TCO2 CALC VENOUS: 37 MMOL/L
TOTAL PROTEIN: 6.6 G/DL (ref 6.4–8.2)
TROPONIN: 0.25 NG/ML
TROPONIN: 0.26 NG/ML
TROPONIN: 0.26 NG/ML
URINE TYPE: ABNORMAL
UROBILINOGEN, URINE: 0.2 E.U./DL
WBC # BLD: 8.9 K/UL (ref 4–11)
WBC UA: >100 /HPF (ref 0–5)
YEAST: PRESENT /HPF

## 2021-11-14 PROCEDURE — 1200000000 HC SEMI PRIVATE

## 2021-11-14 PROCEDURE — 80076 HEPATIC FUNCTION PANEL: CPT

## 2021-11-14 PROCEDURE — 83690 ASSAY OF LIPASE: CPT

## 2021-11-14 PROCEDURE — 70450 CT HEAD/BRAIN W/O DYE: CPT

## 2021-11-14 PROCEDURE — 87086 URINE CULTURE/COLONY COUNT: CPT

## 2021-11-14 PROCEDURE — 87040 BLOOD CULTURE FOR BACTERIA: CPT

## 2021-11-14 PROCEDURE — 74176 CT ABD & PELVIS W/O CONTRAST: CPT

## 2021-11-14 PROCEDURE — 6370000000 HC RX 637 (ALT 250 FOR IP): Performed by: STUDENT IN AN ORGANIZED HEALTH CARE EDUCATION/TRAINING PROGRAM

## 2021-11-14 PROCEDURE — 93005 ELECTROCARDIOGRAM TRACING: CPT | Performed by: STUDENT IN AN ORGANIZED HEALTH CARE EDUCATION/TRAINING PROGRAM

## 2021-11-14 PROCEDURE — 82803 BLOOD GASES ANY COMBINATION: CPT

## 2021-11-14 PROCEDURE — 99283 EMERGENCY DEPT VISIT LOW MDM: CPT

## 2021-11-14 PROCEDURE — 85025 COMPLETE CBC W/AUTO DIFF WBC: CPT

## 2021-11-14 PROCEDURE — 83605 ASSAY OF LACTIC ACID: CPT

## 2021-11-14 PROCEDURE — 2580000003 HC RX 258: Performed by: STUDENT IN AN ORGANIZED HEALTH CARE EDUCATION/TRAINING PROGRAM

## 2021-11-14 PROCEDURE — 84484 ASSAY OF TROPONIN QUANT: CPT

## 2021-11-14 PROCEDURE — 83880 ASSAY OF NATRIURETIC PEPTIDE: CPT

## 2021-11-14 PROCEDURE — 96365 THER/PROPH/DIAG IV INF INIT: CPT

## 2021-11-14 PROCEDURE — 71045 X-RAY EXAM CHEST 1 VIEW: CPT

## 2021-11-14 PROCEDURE — 80048 BASIC METABOLIC PNL TOTAL CA: CPT

## 2021-11-14 PROCEDURE — 80164 ASSAY DIPROPYLACETIC ACD TOT: CPT

## 2021-11-14 PROCEDURE — 83735 ASSAY OF MAGNESIUM: CPT

## 2021-11-14 PROCEDURE — 81001 URINALYSIS AUTO W/SCOPE: CPT

## 2021-11-14 PROCEDURE — 6360000002 HC RX W HCPCS: Performed by: STUDENT IN AN ORGANIZED HEALTH CARE EDUCATION/TRAINING PROGRAM

## 2021-11-14 PROCEDURE — 36415 COLL VENOUS BLD VENIPUNCTURE: CPT

## 2021-11-14 RX ORDER — MIDODRINE HYDROCHLORIDE 5 MG/1
5 TABLET ORAL 3 TIMES DAILY
Status: DISCONTINUED | OUTPATIENT
Start: 2021-11-15 | End: 2021-11-16 | Stop reason: HOSPADM

## 2021-11-14 RX ORDER — POLYETHYLENE GLYCOL 3350 17 G/17G
17 POWDER, FOR SOLUTION ORAL DAILY PRN
Status: DISCONTINUED | OUTPATIENT
Start: 2021-11-14 | End: 2021-11-16 | Stop reason: HOSPADM

## 2021-11-14 RX ORDER — SODIUM CHLORIDE 0.9 % (FLUSH) 0.9 %
5-40 SYRINGE (ML) INJECTION EVERY 12 HOURS SCHEDULED
Status: DISCONTINUED | OUTPATIENT
Start: 2021-11-14 | End: 2021-11-16 | Stop reason: HOSPADM

## 2021-11-14 RX ORDER — POTASSIUM CHLORIDE 7.45 MG/ML
10 INJECTION INTRAVENOUS ONCE
Status: COMPLETED | OUTPATIENT
Start: 2021-11-14 | End: 2021-11-15

## 2021-11-14 RX ORDER — IPRATROPIUM BROMIDE AND ALBUTEROL SULFATE 2.5; .5 MG/3ML; MG/3ML
1 SOLUTION RESPIRATORY (INHALATION) EVERY 4 HOURS PRN
COMMUNITY

## 2021-11-14 RX ORDER — DEXTROSE MONOHYDRATE 50 MG/ML
100 INJECTION, SOLUTION INTRAVENOUS PRN
Status: DISCONTINUED | OUTPATIENT
Start: 2021-11-14 | End: 2021-11-16 | Stop reason: HOSPADM

## 2021-11-14 RX ORDER — METOPROLOL SUCCINATE 25 MG/1
25 TABLET, EXTENDED RELEASE ORAL DAILY
Status: DISCONTINUED | OUTPATIENT
Start: 2021-11-15 | End: 2021-11-15

## 2021-11-14 RX ORDER — SODIUM CHLORIDE 0.9 % (FLUSH) 0.9 %
5-40 SYRINGE (ML) INJECTION PRN
Status: DISCONTINUED | OUTPATIENT
Start: 2021-11-14 | End: 2021-11-16 | Stop reason: HOSPADM

## 2021-11-14 RX ORDER — ONDANSETRON 2 MG/ML
4 INJECTION INTRAMUSCULAR; INTRAVENOUS EVERY 6 HOURS PRN
Status: DISCONTINUED | OUTPATIENT
Start: 2021-11-14 | End: 2021-11-14

## 2021-11-14 RX ORDER — ASPIRIN 81 MG/1
81 TABLET, CHEWABLE ORAL DAILY
Status: DISCONTINUED | OUTPATIENT
Start: 2021-11-15 | End: 2021-11-16 | Stop reason: HOSPADM

## 2021-11-14 RX ORDER — HEPARIN SODIUM 5000 [USP'U]/ML
5000 INJECTION, SOLUTION INTRAVENOUS; SUBCUTANEOUS EVERY 8 HOURS SCHEDULED
Status: DISCONTINUED | OUTPATIENT
Start: 2021-11-14 | End: 2021-11-16 | Stop reason: HOSPADM

## 2021-11-14 RX ORDER — DEXTROSE MONOHYDRATE 25 G/50ML
12.5 INJECTION, SOLUTION INTRAVENOUS PRN
Status: DISCONTINUED | OUTPATIENT
Start: 2021-11-14 | End: 2021-11-16 | Stop reason: HOSPADM

## 2021-11-14 RX ORDER — NICOTINE POLACRILEX 4 MG
15 LOZENGE BUCCAL PRN
Status: DISCONTINUED | OUTPATIENT
Start: 2021-11-14 | End: 2021-11-16 | Stop reason: HOSPADM

## 2021-11-14 RX ORDER — ACETAMINOPHEN 325 MG/1
650 TABLET ORAL EVERY 6 HOURS PRN
Status: DISCONTINUED | OUTPATIENT
Start: 2021-11-14 | End: 2021-11-16 | Stop reason: HOSPADM

## 2021-11-14 RX ORDER — INSULIN LISPRO 100 [IU]/ML
0-6 INJECTION, SOLUTION INTRAVENOUS; SUBCUTANEOUS NIGHTLY
Status: DISCONTINUED | OUTPATIENT
Start: 2021-11-14 | End: 2021-11-16 | Stop reason: HOSPADM

## 2021-11-14 RX ORDER — ATORVASTATIN CALCIUM 80 MG/1
80 TABLET, FILM COATED ORAL NIGHTLY
Status: DISCONTINUED | OUTPATIENT
Start: 2021-11-14 | End: 2021-11-16 | Stop reason: HOSPADM

## 2021-11-14 RX ORDER — ACETAMINOPHEN 650 MG/1
650 SUPPOSITORY RECTAL EVERY 6 HOURS PRN
Status: DISCONTINUED | OUTPATIENT
Start: 2021-11-14 | End: 2021-11-16 | Stop reason: HOSPADM

## 2021-11-14 RX ORDER — VALPROIC ACID 250 MG/5ML
130 SOLUTION ORAL 3 TIMES DAILY
Status: DISCONTINUED | OUTPATIENT
Start: 2021-11-14 | End: 2021-11-16 | Stop reason: HOSPADM

## 2021-11-14 RX ORDER — FAMOTIDINE 10 MG
10 TABLET ORAL DAILY
COMMUNITY

## 2021-11-14 RX ORDER — SODIUM CHLORIDE 9 MG/ML
25 INJECTION, SOLUTION INTRAVENOUS PRN
Status: DISCONTINUED | OUTPATIENT
Start: 2021-11-14 | End: 2021-11-16 | Stop reason: HOSPADM

## 2021-11-14 RX ORDER — SODIUM CHLORIDE, SODIUM LACTATE, POTASSIUM CHLORIDE, AND CALCIUM CHLORIDE .6; .31; .03; .02 G/100ML; G/100ML; G/100ML; G/100ML
500 INJECTION, SOLUTION INTRAVENOUS ONCE
Status: COMPLETED | OUTPATIENT
Start: 2021-11-14 | End: 2021-11-14

## 2021-11-14 RX ORDER — VORICONAZOLE 40 MG/ML
200 POWDER, FOR SUSPENSION ORAL EVERY 12 HOURS SCHEDULED
Status: DISCONTINUED | OUTPATIENT
Start: 2021-11-14 | End: 2021-11-16 | Stop reason: HOSPADM

## 2021-11-14 RX ORDER — ONDANSETRON 4 MG/1
4 TABLET, ORALLY DISINTEGRATING ORAL EVERY 8 HOURS PRN
Status: DISCONTINUED | OUTPATIENT
Start: 2021-11-14 | End: 2021-11-14

## 2021-11-14 RX ORDER — INSULIN LISPRO 100 [IU]/ML
0-12 INJECTION, SOLUTION INTRAVENOUS; SUBCUTANEOUS
Status: DISCONTINUED | OUTPATIENT
Start: 2021-11-15 | End: 2021-11-16 | Stop reason: HOSPADM

## 2021-11-14 RX ADMIN — SODIUM CHLORIDE, PRESERVATIVE FREE 10 ML: 5 INJECTION INTRAVENOUS at 23:43

## 2021-11-14 RX ADMIN — HEPARIN SODIUM 5000 UNITS: 5000 INJECTION INTRAVENOUS; SUBCUTANEOUS at 23:35

## 2021-11-14 RX ADMIN — DEXTROSE MONOHYDRATE 1000 MG: 5 INJECTION INTRAVENOUS at 16:23

## 2021-11-14 RX ADMIN — ATORVASTATIN CALCIUM 80 MG: 80 TABLET, FILM COATED ORAL at 23:46

## 2021-11-14 RX ADMIN — VALPROIC ACID 130 MG: 250 SOLUTION ORAL at 23:26

## 2021-11-14 RX ADMIN — VORICONAZOLE 200 MG: 40 POWDER, FOR SUSPENSION ORAL at 23:26

## 2021-11-14 RX ADMIN — INSULIN LISPRO 1 UNITS: 100 INJECTION, SOLUTION INTRAVENOUS; SUBCUTANEOUS at 23:36

## 2021-11-14 RX ADMIN — INSULIN GLARGINE 10 UNITS: 100 INJECTION, SOLUTION SUBCUTANEOUS at 23:36

## 2021-11-14 RX ADMIN — POTASSIUM CHLORIDE 10 MEQ: 7.45 INJECTION INTRAVENOUS at 23:39

## 2021-11-14 RX ADMIN — SODIUM CHLORIDE, SODIUM LACTATE, POTASSIUM CHLORIDE, AND CALCIUM CHLORIDE 500 ML: .6; .31; .03; .02 INJECTION, SOLUTION INTRAVENOUS at 16:23

## 2021-11-14 ASSESSMENT — PAIN SCALES - PAIN ASSESSMENT IN ADVANCED DEMENTIA (PAINAD)
NEGVOCALIZATION: 0
CONSOLABILITY: 0
BODYLANGUAGE: 0
BREATHING: 0
TOTALSCORE: 0
BODYLANGUAGE: 0
CONSOLABILITY: 0
TOTALSCORE: 0
BREATHING: 0
FACIALEXPRESSION: 0
FACIALEXPRESSION: 0
NEGVOCALIZATION: 0

## 2021-11-14 ASSESSMENT — PAIN SCALES - GENERAL: PAINLEVEL_OUTOF10: 0

## 2021-11-14 NOTE — H&P
CORONARY ARTERY BYPASS GRAFT      DIAGNOSTIC CARDIAC CATH LAB PROCEDURE      KNEE SURGERY      UPPER GASTROINTESTINAL ENDOSCOPY N/A 10/14/2019    EGD DIAGNOSTIC ONLY performed by Ky Abdullahi MD at [address] [place] [address] 10/18/2021    EGD BIOPSY performed by Aron Goddard MD at [place] [address]   ·     Medications Priorto Admission:    · Not in a hospital admission. Allergies:  Iodine    Social History:   · TOBACCO:   reports that she has never smoked. She has never used smokeless tobacco.  · ETOH:   reports no history of alcohol use. · DRUGS : none  · Patient currently lives at nursing facility  ·   Family History:       Problem Relation Age of Onset    Stroke Mother     Heart Disease Father    ·     Review of Systems   Unable to perform ROS: Mental status change       ROS: A 10 point review of systems was conducted, significant findings as noted in HPI. Physical Exam  Constitutional:       Appearance: She is ill-appearing. HENT:      Head: Normocephalic and atraumatic. Cardiovascular:      Rate and Rhythm: Regular rhythm. Tachycardia present. Pulmonary:      Effort: Pulmonary effort is normal. No respiratory distress. Abdominal:      Palpations: Abdomen is soft. Tenderness: There is abdominal tenderness. Musculoskeletal:         General: No swelling. Cervical back: Neck supple. Skin:     General: Skin is warm and dry. Neurological:      Comments: Unresponsive to pain and verbal, moaning in bed.        Physical exam:       Vitals:    11/14/21 1338   BP: (!) 101/48   Pulse: 96   Resp: 20   Temp: 99.2 °F (37.3 °C)   SpO2: 96%       DATA:    Labs:  CBC:   Recent Labs     11/14/21  1446   WBC 8.9   HGB 8.3*   HCT 24.5*          BMP:   Recent Labs     11/14/21  1446      K 3.2*   CL 94*   CO2 30   BUN 55*   CREATININE 1.3*   GLUCOSE 236*     LFT's:   Recent Labs     11/14/21  1446   AST 22   ALT 25   BILITOT 0.3   ALKPHOS 143* Troponin:   Recent Labs     11/14/21  1446 11/14/21  1619   TROPONINI 0.25* 0.26*     BNP:No results for input(s): BNP in the last 72 hours. ABGs: No results for input(s): PHART, FAM4REW, PO2ART in the last 72 hours. INR: No results for input(s): INR in the last 72 hours. U/A:  Recent Labs     11/14/21  1445   COLORU Yellow   PHUR 6.0   WBCUA >100*   RBCUA None seen   YEAST Present*   BACTERIA Rare*   CLARITYU CLOUDY*   SPECGRAV 1.015   LEUKOCYTESUR LARGE*   UROBILINOGEN 0.2   BILIRUBINUR Negative   BLOODU SMALL*   GLUCOSEU Negative       CT ABDOMEN PELVIS WO CONTRAST Additional Contrast? None   Final Result      Decreased right pleural effusion with persistent right lower lobe consolidation/atelectasis. Unchanged rounded hypodense lesion in the right lobe of the liver. Postsurgical changes in the abdomen, with gastrostomy and ileostomy noted. No new acute findings in the abdomen or pelvis. CT Head WO Contrast   Final Result      Chronic atrophic and periventricular ischemic changes of the brain without acute hemorrhage, edema or hydrocephalus. No subdural or epidural hematoma appreciated       XR CHEST PORTABLE   Final Result      1. No acute process or consolidation   2. Right IJ central venous catheter remains in place with the tips at the atriocaval junction. 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 AMS  - afebrile, no leukocytosis  - CBC daily  - blood, respiratory, and urine culture pending  - UA possible contamination. Possible source of infection. Was given dose of Ceftriaxone and Voriconazole in ED. Urine culture grew Candida in 10/21.  Will continue Voriconazole and start Merrem  - ID consulted    Elevated troponin  - likely NSTEMI 2/2 demand ischemia  - EKG without acute ischemic changes  - will check one more troponin     ESRD on HD  - nephro consulted  - continue home midodrine    Lactic acidosis, resolved  - improved after 500 mL IV LR bolus      Chronic Issues:  CHF- last echo 10/19/21 with EF 15-20%, global dysfunction, grade 2 diastolic dysfunction  HTN- continue home metoprolol  DM2- on 10 units Lantus BID at home.  Started on 10 units Lantus and MDSSI, POCT, hypoglycemia protocol  History of ischemic bowel s/p colostomy, ileostomy, and PEG tube  Chronic Anemia- baseline ~9, CBC daily    Will discuss with attending physician Dr. Tegan Romero    Code Status: Limited code - per past admission, will confirm  FEN: NPO  PPX: heparin  DISPO: [id number] Keshawn Henao DO  11/14/2021,  5:56 PM

## 2021-11-14 NOTE — ED PROVIDER NOTES
Date ofevaluation: 11/14/2021    Chief Complaint   Altered Mental Status      Nursing Notes, Past Medical Hx, Past Surgical Hx, Social Hx, Allergies, and Family Hx were reviewed. History of Present Illness     Leonor Sierra is a 80 y.o. female CAD status post CABG, ESRD on hemodialysis, diabetes, ischemic bowel status post colostomy (7/21), G-tube dependent feeds & meds s/p PEG placement 9/7/21, recent admission for sepsis due to urinary tract infection who presents nursing home. EMS states they were called for unresponsive patient however when they arrived the patient was responsive. They states that her mental status has improved and is now attempting to verbalize. They did note that she was febrile on transfer. On arrival patient is awake does respond to her name and follows simple commands. She does attempt to verbalize however she is difficult to understand. She states she is not in any pain. Review of Systems     Review of Systems   Unable to perform ROS: Dementia       Past Medical, Surgical, Family, and Social History         Diagnosis Date    CAD (coronary artery disease)     s/p CABG 2000. Cath 9/10 1/3 grafts occluded. Patent LIMA-LAD, patent SVG-OM, 60% distal LAD lesion distal to anastamosis, occluded SVG-LCX,, 70% prox native LCx, diffuse RCA dz. Declines redo CABG.   will consider PCI of LAD and LCX    Carotid artery disease ([address]     Diabetes mellitus ([facility] [address] [address]     followed by PCP    Hyperlipidemia     rec semi annual lipids w/ LDL goal <70    Lung mass     likely benign    Syncope     secondary to medications         Procedure Laterality Date    BREAST SURGERY      left breast tumor removed    CARDIAC SURGERY  2000    CABG    CHOLECYSTECTOMY      CORONARY ANGIOPLASTY WITH STENT PLACEMENT  2019    CORONARY ARTERY BYPASS GRAFT      DIAGNOSTIC CARDIAC CATH LAB PROCEDURE      KNEE SURGERY      UPPER GASTROINTESTINAL ENDOSCOPY N/A 10/14/2019    EGD DIAGNOSTIC ONLY performed by Scott Cody MD at [address] [facility] 10/18/2021    EGD BIOPSY performed by Chely Park MD at [place] [address]     Her family history includes Heart Disease in her father; Stroke in her mother. She reports that she has never smoked. She has never used smokeless tobacco. She reports that she does not drink alcohol and does not use drugs. Medications     Previous Medications    ACETAMINOPHEN (TYLENOL) 325 MG TABLET    650 mg by Per G Tube route every 4 hours as needed for Pain or Fever    ASPIRIN 81 MG CHEWABLE TABLET    81 mg by Per G Tube route daily    ATORVASTATIN (LIPITOR) 80 MG TABLET    80 mg by Per G Tube route nightly    GABAPENTIN (NEURONTIN) 100 MG CAPSULE    Take 100 mg by mouth 3 times daily. INSULIN GLARGINE (SEMGLEE) 100 UNIT/ML INJECTION VIAL    Inject 10 Units into the skin 2 times daily    INSULIN LISPRO (HUMALOG) 100 UNIT/ML INJECTION VIAL    Inject 0-10 Units into the skin every 6 hours Per Sliding scale    METOPROLOL SUCCINATE (TOPROL XL) 25 MG EXTENDED RELEASE TABLET    Take 1 tablet by mouth daily    MIDODRINE (PROAMATINE) 5 MG TABLET    1 tablet by PEG Tube route 3 times daily    OLANZAPINE (ZYPREXA) 10 MG TABLET    10 mg by Per G Tube route nightly    VALPROIC ACID (DEPAKENE) 250 MG/5ML SOLN ORAL SOLUTION    130 mg by Per G Tube route 3 times daily    VITAMIN B COMPLEX (NATURES BLEND B COMPLEX) TABS TABLET    1 tablet by Per G Tube route daily       Allergies     She is allergic to iodine. Physical Exam     INITIAL VITALS: BP (!) 101/48   Pulse 96   Temp 99.2 °F (37.3 °C) (Axillary)   Resp 20   Ht 5' 2\" (1.575 m)   Wt 136 lb (61.7 kg)   LMP  (LMP Unknown)   SpO2 96%   BMI 24.87 kg/m²    Physical Exam  Constitutional:       Comments: Chronically ill-appearing   HENT:      Head: Normocephalic and atraumatic. Nose: Nose normal. No congestion. Mouth/Throat:      Mouth: Mucous membranes are dry.       Comments: Tongue is midline  Eyes:      Extraocular Movements: Extraocular movements intact. Pupils: Pupils are equal, round, and reactive to light. Cardiovascular:      Rate and Rhythm: Normal rate and regular rhythm. Pulses: Normal pulses. Heart sounds: Normal heart sounds. Comments: Hemodialysis catheter in right chest without drainage or erythema  Pulmonary:      Effort: Pulmonary effort is normal.      Breath sounds: Normal breath sounds. Abdominal:      General: Abdomen is flat. Palpations: Abdomen is soft. Comments: Stoma is pink, G-tube in place   Musculoskeletal:         General: No swelling or tenderness. Cervical back: Normal range of motion and neck supple. Skin:     General: Skin is warm and dry. Capillary Refill: Capillary refill takes less than 2 seconds. Neurological:      Mental Status: She is alert. Comments: Patient responds to name, follows simple commands, able to move all extremities. Diagnostic Results     EKG   Interpreted in conjunction with emergency department Kathy Jack MD  Rhythm: normal sinus   Rate: normal  Axis: normal  Ectopy: none  Conduction: normal  ST Segments: ST depression in leads I to V4 V5 V6  T Waves: no acute change  Q Waves: none  Clinical Impression: no acute changes  Comparison:  Unchanged from prio    RADIOLOGY:  CT ABDOMEN PELVIS WO CONTRAST Additional Contrast? None   Final Result      Decreased right pleural effusion with persistent right lower lobe consolidation/atelectasis. Unchanged rounded hypodense lesion in the right lobe of the liver. Postsurgical changes in the abdomen, with gastrostomy and ileostomy noted. No new acute findings in the abdomen or pelvis. CT Head WO Contrast   Final Result      Chronic atrophic and periventricular ischemic changes of the brain without acute hemorrhage, edema or hydrocephalus.        No subdural or epidural hematoma appreciated       XR CHEST PORTABLE   Final Result      1. No acute process or consolidation   2. Right IJ central venous catheter remains in place with the tips at the atriocaval junction. Improved aeration noted in the right lung compared to prior study                     LABS:   Labs Reviewed   LACTATE, SEPSIS - Abnormal; Notable for the following components:       Result Value    Lactic Acid, Sepsis 2.3 (*)     All other components within normal limits    Narrative:     Performed at: [facility]  [address] [place] [address] [address]   Phone [phone]   BLOOD GAS, VENOUS - Abnormal; Notable for the following components:    pCO2, Jd 51.1 (*)     HCO3, Venous 35.3 (*)     Base Excess, Jd 10.1 (*)     All other components within normal limits    Narrative:     Performed at: [facility] [place] [address] [place] [address] [address]   Phone [phone]   CBC WITH AUTO DIFFERENTIAL - Abnormal; Notable for the following components:    RBC 2.52 (*)     Hemoglobin 8.3 (*)     Hematocrit 24.5 (*)     RDW 18.9 (*)     Monocytes Absolute 1.6 (*)     All other components within normal limits    Narrative:     Performed at: [facility] - [place] [address] [address]   Phone [phone]   BASIC METABOLIC PANEL W/ REFLEX TO MG FOR LOW K - Abnormal; Notable for the following components:    Potassium reflex Magnesium 3.2 (*)     Chloride 94 (*)     Glucose 236 (*)     BUN 55 (*)     CREATININE 1.3 (*)     GFR Non- 40 (*)     GFR  49 (*)     All other components within normal limits    Narrative:     Performed at:   [facility]  [address] [place] [address] [address]   Phone [phone]   HEPATIC FUNCTION PANEL - Abnormal; Notable for the following components:    Albumin 3.2 (*)     Alkaline Phosphatase 143 (*)     All other components within normal limits    Narrative:     Performed at: The [facility] [employer] [facility]  [address] [facility] [address]   Phone [phone]   LIPASE - Abnormal; Notable for the following components:    Lipase 101.0 (*)     All other components within normal limits    Narrative:     Performed at: The [facility] [employer] [facility]  [address], [facility] [address]   Phone [phone]   TROPONIN - Abnormal; Notable for the following components:    Troponin 0.25 (*)     All other components within normal limits    Narrative:     Performed at: The [facility] [facility]  [address], [facility] [address]   Phone [phone]   BRAIN NATRIURETIC PEPTIDE - Abnormal; Notable for the following components:    Pro-BNP 22,297 (*)     All other components within normal limits    Narrative:     Performed at: [facility] [employer] [facility]  [address], [facility] [address]   Phone [phone]   URINALYSIS - Abnormal; Notable for the following components:    Clarity, UA CLOUDY (*)     Blood, Urine SMALL (*)     Protein,  (*)     Leukocyte Esterase, Urine LARGE (*)     All other components within normal limits    Narrative:     Performed at: [facility] [employer] [facility]  [address], [facility] [address]   Phone [phone]   MICROSCOPIC URINALYSIS - Abnormal; Notable for the following components:    WBC, UA >100 (*)     Epithelial Cells, UA 11-20 (*)     Bacteria, UA Rare (*)     Yeast, UA Present (*)     All other components within normal limits    Narrative:     Performed at:   [facility] [employer] [facility]  [address], [facility] [address]   Phone [phone]   TROPONIN - Abnormal; Notable for the following components:    Troponin 0.26 (*)     All other components within normal limits    Narrative:     Performed at:  [facility] [employer] [facility]  [address]   Phone [phone]   CULTURE, URINE   CULTURE, BLOOD 1   CULTURE, BLOOD 2   LACTATE, SEPSIS    Narrative:     Performed at: [facility] [employer] [facility]  [address]   Phone [phone]   MAGNESIUM    Narrative:     Performed at: [facility] [employer] [facility]  [address]   Phone [phone]       RECENTVITALS:  BP: (!) 101/48, Temp: 99.2 °F (37.3 °C), Pulse: 96, Resp: 20     Procedures         ED Course     The patient was given the following medications:  Orders Placed This Encounter   Medications    cefTRIAXone (ROCEPHIN) 1000 mg IVPB in 50 mL D5W minibag     Order Specific Question:   Antimicrobial Indications     Answer:   Urinary Tract Infection    lactated ringers bolus    voriconazole (VFEND) 40 MG/ML suspension 200 mg            CONSULTS:  IP CONSULT TO HOSPITALIST    MEDICAL DECISION MAKING     Caleb Hardy is a 80 y.o. female presenting with altered mental status, likely due to urinary tract infection. Patient was at her baseline mental status on my exam, following commands. CT of patient's head showed no acute changes. Her work-up revealed that she likely has a UTI. In the past she did grow Candida glabrata in her urine and therefore we started her on ceftriaxone and voriconazole while a urine culture was pending. She was also given 500 mL of fluid. A CT of the abdomen and pelvis did not reveal any nidus of infection. Her lipase was slightly elevated however she had minimal abdominal pain. Her troponin was elevated to 0.25 however this was stable and her EKG showed no evidence of ischemia. This may be due to her impaired renal function. Her BNP was elevated however her chest x-ray showed no evidence of pulmonary edema.   Patient will be admitted to medicine to continue ceftriaxone and voriconazole. This patient was also evaluated by the attending physician. All care plans were discussed and agreedupon. Clinical Impression     1. Urinary tract infection without hematuria, site unspecified        Disposition/Plan     PATIENT REFERRED TO:  No follow-up provider specified.     DISCHARGE MEDICATIONS:  New Prescriptions    No medications on file       DISPOSITION Decision To Admit 11/14/2021 05:23:39 PM        Lucille Garcia MD  Resident  11/14/21 3956

## 2021-11-14 NOTE — ED TRIAGE NOTES
EMS brought in states facility said patient was unresponsive when they went in to check on her. When EMS arrived she was responsive to her baseline. She was in ER yesterday to have her Gtube unclogged. Her Bloodsugar was 350 and felt febrile.

## 2021-11-14 NOTE — ED NOTES
Bed:   Expected date:   Expected time:   Means of arrival:   Comments:  debbie Miner RN  11/14/21 6888

## 2021-11-14 NOTE — ED PROVIDER NOTES
[address]          ATTENDING PHYSICIAN NOTE       Date of evaluation: 11/13/2021    Chief Complaint     Chief Complaint   Patient presents with    G Tube Complications         History of Present Illness     HPI   Leonor Sierra is a 80 y.o. 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 No other reported abnormalities or concerns per the facility. Review of Systems     Review of Systems   Unable to perform ROS: Dementia (Patient's baseline is mumbled speech and at best AAOx1, per facility and recent hospital discharge notes)         Past Medical, Surgical, Family, and Social History     Past Medical History:   Diagnosis Date    CAD (coronary artery disease)     s/p CABG 2000. Cath 9/10 1/3 grafts occluded. Patent LIMA-LAD, patent SVG-OM, 60% distal LAD lesion distal to anastamosis, occluded SVG-LCX,, 70% prox native LCx, diffuse RCA dz. Declines redo CABG.   will consider PCI of LAD and LCX    Carotid artery disease ([address]     Diabetes mellitus ([facility]     followed by PCP    Hyperlipidemia     rec semi annual lipids w/ LDL goal <70    Lung mass     likely benign    Syncope     secondary to medications       Past Surgical History:   Procedure Laterality Date    BREAST SURGERY      left breast tumor removed    CARDIAC SURGERY  2000    CABG    CHOLECYSTECTOMY      CORONARY ANGIOPLASTY WITH STENT PLACEMENT  2019    CORONARY ARTERY BYPASS GRAFT      DIAGNOSTIC CARDIAC CATH LAB PROCEDURE      KNEE SURGERY      UPPER GASTROINTESTINAL ENDOSCOPY N/A 10/14/2019    EGD DIAGNOSTIC ONLY performed by Desmond Mercado MD at [facility] N/A 10/18/2021    EGD BIOPSY performed by Amber Paredes MD at [address] [place], [place] [address]e N ENDOSCOPY        Family History   Problem Relation Age of Onset    Stroke Mother     Heart Disease Father         Social History     Tobacco Use    Smoking status: Never Smoker    Smokeless tobacco: Never Used   Vaping Use    Vaping Use: Never used   Substance Use Topics    Alcohol use: No    Drug use: No          Medications     Discharge Medication List as of 11/13/2021  7:32 PM      CONTINUE these medications which have NOT CHANGED    Details   insulin glargine (SEMGLEE) 100 UNIT/ML injection vial Inject 10 Units into the skin 2 times daily, Disp-10 mL, R-3Normal      metoprolol succinate (TOPROL XL) 25 MG extended release tablet Take 1 tablet by mouth daily, Disp-30 tablet, R-3Normal      midodrine (PROAMATINE) 5 MG tablet 1 tablet by PEG Tube route 3 times daily, Disp-90 tablet, R-3Normal      acetaminophen (TYLENOL) 325 MG tablet 650 mg by Per G Tube route every 4 hours as needed for Pain or FeverHistorical Med      insulin lispro (HUMALOG) 100 UNIT/ML injection vial Inject 0-10 Units into the skin every 6 hours Per Sliding scaleHistorical Med      aspirin 81 MG chewable tablet 81 mg by Per G Tube route dailyHistorical Med      vitamin B complex (NATURES BLEND B COMPLEX) TABS tablet 1 tablet by Per G Tube route dailyHistorical Med      atorvastatin (LIPITOR) 80 MG tablet 80 mg by Per G Tube route nightlyHistorical Med      OLANZapine (ZYPREXA) 10 MG tablet 10 mg by Per G Tube route nightlyHistorical Med      valproic acid (DEPAKENE) 250 MG/5ML SOLN oral solution 130 mg by Per G Tube route 3 times dailyHistorical Med      gabapentin (NEURONTIN) 100 MG capsule Take 100 mg by mouth 3 times daily. Historical Med             Allergies     Allergies as of 11/13/2021 - Fully Reviewed 11/13/2021   Allergen Reaction Noted    Iodine Hives 09/17/2010        Physical Exam     ED Triage Vitals [11/13/21 1332]   Enc Vitals Group      /62      Pulse 86      Resp 18      Temp 98 °F (36.7 °C)      Temp Source Axillary      SpO2 93 %      Weight 130 lb (59 kg)       Physical Exam  Vitals and nursing note reviewed. Constitutional:       General: She is not in acute distress. Comments: Appears stated age, chronically ill patient who is bedbound, colostomy bag dependent stools, feeding tube dependent for feeds and medications. Does not appear toxic. HENT:      Head: Normocephalic and atraumatic. Right Ear: External ear normal.      Left Ear: External ear normal.      Nose: Nose normal.      Mouth/Throat:      Mouth: Mucous membranes are moist.   Eyes:      General: No scleral icterus. Pupils: Pupils are equal, round, and reactive to light. Comments: Tracks physician around the room with her eyes, though does not follow commands for formal assessment of extraocular movements. Cardiovascular:      Rate and Rhythm: Normal rate and regular rhythm. Pulses: Normal pulses. Pulmonary:      Effort: Pulmonary effort is normal. No respiratory distress. Breath sounds: Normal breath sounds. Abdominal:      General: Abdomen is flat. Palpations: Abdomen is soft. Tenderness: There is no abdominal tenderness. Musculoskeletal:         General: No deformity or signs of injury. Cervical back: Neck supple. Right lower leg: No edema. Left lower leg: No edema. Skin:     General: Skin is warm and dry. Capillary Refill: Capillary refill takes less than 2 seconds. Findings: No bruising. Neurological:      Mental Status: Mental status is at baseline.       Comments: As needed above, according to EMS, her facility, and notes from recent inpatient hospital discharge, her baseline is essentially alert and oriented x1, though speech is grossly mumbled and difficult to understand. Diagnostic Results       RADIOLOGY:  XR ABDOMEN (KUB) (SINGLE AP VIEW)   Final Result   Addendum 1 of 1   [* ADDENDUM #1 *   Addendum: Contrast pushed through gastrostomy catheter on second AP image. Contrast within stomach lumen confirming placement of gastrostomy catheter    tip in gastric lumen. Final   Impression:   1. Gastrostomy tube with tip overlying expected location of stomach. LABS:   No results found for this visit on 11/13/21. RECENT VITALS:  BP: 126/62,Temp: 98 °F (36.7 °C), Pulse: 86, Resp: 18, SpO2: 93 %     Procedures       Feeding Tube    Date/Time: 11/13/2021 5:45 PM  Performed by: Doug Arrieta MD  Authorized by: Doug Arrieta MD     Procedure details:     Patient position:  Supine    Procedure type: Positioning and patency assessment > declogging of in-place gastrostomy tube with CLOG-BUSTER. Tube type:  Gastrostomy    Tube size:  12 Fr    Bulb inflation volume:  NONE > gastric luminal bumper per surgery procedure notes from 9/14  Post-procedure details:     Placement/position confirmation:  Gastric contents aspirated, contrast, auscultation, insufflation of air and x-ray  Comments:      De-clogged feeding tube. ED Course     Nursing Notes, Past Medical Hx, Past Surgical Hx, Social Hx,Allergies, and Family Hx were reviewed.          Key medications administered in the ED:  ED Medication Orders (From admission, onward)    Start Ordered     Status Ordering Provider    11/13/21 2710 11/13/21 1733  diatrizoate meglumine-sodium (GASTROGRAFIN) 66-10 % solution 30 mL  ONCE         Last MAR action: Given by Other - by Bowen Kimbrough on 11/13/21 at [address] [facility], [address]           CONSULTS:  None    MEDICAL DECISIONMAKING / ASSESSMENT / Jacy Rosa is a 80 y.o. female with PMHx as MEDICATIONS:  Discharge Medication List as of 11/13/2021  7:32 PM      CONTINUE these medications which have NOT CHANGED    Details   insulin glargine (SEMGLEE) 100 UNIT/ML injection vial Inject 10 Units into the skin 2 times daily, Disp-10 mL, R-3Normal      metoprolol succinate (TOPROL XL) 25 MG extended release tablet Take 1 tablet by mouth daily, Disp-30 tablet, R-3Normal      midodrine (PROAMATINE) 5 MG tablet 1 tablet by PEG Tube route 3 times daily, Disp-90 tablet, R-3Normal      acetaminophen (TYLENOL) 325 MG tablet 650 mg by Per G Tube route every 4 hours as needed for Pain or FeverHistorical Med      insulin lispro (HUMALOG) 100 UNIT/ML injection vial Inject 0-10 Units into the skin every 6 hours Per Sliding scaleHistorical Med      aspirin 81 MG chewable tablet 81 mg by Per G Tube route dailyHistorical Med      vitamin B complex (NATURES BLEND B COMPLEX) TABS tablet 1 tablet by Per G Tube route dailyHistorical Med      atorvastatin (LIPITOR) 80 MG tablet 80 mg by Per G Tube route nightlyHistorical Med      OLANZapine (ZYPREXA) 10 MG tablet 10 mg by Per G Tube route nightlyHistorical Med      valproic acid (DEPAKENE) 250 MG/5ML SOLN oral solution 130 mg by Per G Tube route 3 times dailyHistorical Med      gabapentin (NEURONTIN) 100 MG capsule Take 100 mg by mouth 3 times daily. Historical Med            Sal Vitale MD  Emergency Medicine  [facility]     Christine Felix MD  11/14/21 0110

## 2021-11-15 LAB
ANION GAP SERPL CALCULATED.3IONS-SCNC: 13 MMOL/L (ref 3–16)
BASOPHILS ABSOLUTE: 0 K/UL (ref 0–0.2)
BASOPHILS RELATIVE PERCENT: 0.4 %
BUN BLDV-MCNC: 51 MG/DL (ref 7–20)
CALCIUM SERPL-MCNC: 8.8 MG/DL (ref 8.3–10.6)
CHLORIDE BLD-SCNC: 101 MMOL/L (ref 99–110)
CO2: 28 MMOL/L (ref 21–32)
CREAT SERPL-MCNC: 1.3 MG/DL (ref 0.6–1.2)
EKG ATRIAL RATE: 91 BPM
EKG DIAGNOSIS: NORMAL
EKG P AXIS: 48 DEGREES
EKG P-R INTERVAL: 164 MS
EKG Q-T INTERVAL: 344 MS
EKG QRS DURATION: 76 MS
EKG QTC CALCULATION (BAZETT): 423 MS
EKG R AXIS: 16 DEGREES
EKG T AXIS: 189 DEGREES
EKG VENTRICULAR RATE: 91 BPM
EOSINOPHILS ABSOLUTE: 0.1 K/UL (ref 0–0.6)
EOSINOPHILS RELATIVE PERCENT: 0.8 %
GFR AFRICAN AMERICAN: 49
GFR NON-AFRICAN AMERICAN: 40
GLUCOSE BLD-MCNC: 145 MG/DL (ref 70–99)
GLUCOSE BLD-MCNC: 150 MG/DL (ref 70–99)
GLUCOSE BLD-MCNC: 193 MG/DL (ref 70–99)
GLUCOSE BLD-MCNC: 199 MG/DL (ref 70–99)
GLUCOSE BLD-MCNC: 238 MG/DL (ref 70–99)
HCT VFR BLD CALC: 21.8 % (ref 36–48)
HEMOGLOBIN: 7.5 G/DL (ref 12–16)
LIPASE: 73 U/L (ref 13–60)
LYMPHOCYTES ABSOLUTE: 1.1 K/UL (ref 1–5.1)
LYMPHOCYTES RELATIVE PERCENT: 12.6 %
MAGNESIUM: 2.1 MG/DL (ref 1.8–2.4)
MCH RBC QN AUTO: 33.5 PG (ref 26–34)
MCHC RBC AUTO-ENTMCNC: 34.6 G/DL (ref 31–36)
MCV RBC AUTO: 96.8 FL (ref 80–100)
MONOCYTES ABSOLUTE: 1.4 K/UL (ref 0–1.3)
MONOCYTES RELATIVE PERCENT: 16.2 %
NEUTROPHILS ABSOLUTE: 6.3 K/UL (ref 1.7–7.7)
NEUTROPHILS RELATIVE PERCENT: 70 %
PDW BLD-RTO: 18.7 % (ref 12.4–15.4)
PERFORMED ON: ABNORMAL
PLATELET # BLD: 156 K/UL (ref 135–450)
PMV BLD AUTO: 9.4 FL (ref 5–10.5)
POTASSIUM REFLEX MAGNESIUM: 3.5 MMOL/L (ref 3.5–5.1)
RBC # BLD: 2.25 M/UL (ref 4–5.2)
SODIUM BLD-SCNC: 142 MMOL/L (ref 136–145)
TROPONIN: 0.27 NG/ML
TROPONIN: 0.28 NG/ML
URINE CULTURE, ROUTINE: NORMAL
VALPROIC ACID LEVEL: 27.1 UG/ML (ref 50–100)
WBC # BLD: 8.9 K/UL (ref 4–11)

## 2021-11-15 PROCEDURE — 84484 ASSAY OF TROPONIN QUANT: CPT

## 2021-11-15 PROCEDURE — 92610 EVALUATE SWALLOWING FUNCTION: CPT

## 2021-11-15 PROCEDURE — 6370000000 HC RX 637 (ALT 250 FOR IP): Performed by: STUDENT IN AN ORGANIZED HEALTH CARE EDUCATION/TRAINING PROGRAM

## 2021-11-15 PROCEDURE — 6360000002 HC RX W HCPCS: Performed by: STUDENT IN AN ORGANIZED HEALTH CARE EDUCATION/TRAINING PROGRAM

## 2021-11-15 PROCEDURE — 2580000003 HC RX 258: Performed by: STUDENT IN AN ORGANIZED HEALTH CARE EDUCATION/TRAINING PROGRAM

## 2021-11-15 PROCEDURE — 85025 COMPLETE CBC W/AUTO DIFF WBC: CPT

## 2021-11-15 PROCEDURE — 80048 BASIC METABOLIC PNL TOTAL CA: CPT

## 2021-11-15 PROCEDURE — 1200000000 HC SEMI PRIVATE

## 2021-11-15 PROCEDURE — 99223 1ST HOSP IP/OBS HIGH 75: CPT | Performed by: INTERNAL MEDICINE

## 2021-11-15 PROCEDURE — 83735 ASSAY OF MAGNESIUM: CPT

## 2021-11-15 PROCEDURE — 83690 ASSAY OF LIPASE: CPT

## 2021-11-15 PROCEDURE — 36415 COLL VENOUS BLD VENIPUNCTURE: CPT

## 2021-11-15 PROCEDURE — 6370000000 HC RX 637 (ALT 250 FOR IP): Performed by: INTERNAL MEDICINE

## 2021-11-15 RX ORDER — CASTOR OIL AND BALSAM, PERU 788; 87 MG/G; MG/G
OINTMENT TOPICAL 2 TIMES DAILY
Status: DISCONTINUED | OUTPATIENT
Start: 2021-11-15 | End: 2021-11-16 | Stop reason: HOSPADM

## 2021-11-15 RX ADMIN — METOPROLOL TARTRATE 12.5 MG: 25 TABLET, FILM COATED ORAL at 20:49

## 2021-11-15 RX ADMIN — MEROPENEM 1000 MG: 1 INJECTION, POWDER, FOR SOLUTION INTRAVENOUS at 00:43

## 2021-11-15 RX ADMIN — VALPROIC ACID 130 MG: 250 SOLUTION ORAL at 10:43

## 2021-11-15 RX ADMIN — MIDODRINE HYDROCHLORIDE 5 MG: 5 TABLET ORAL at 14:40

## 2021-11-15 RX ADMIN — ACETAMINOPHEN 650 MG: 650 SUPPOSITORY RECTAL at 18:26

## 2021-11-15 RX ADMIN — INSULIN LISPRO 1 UNITS: 100 INJECTION, SOLUTION INTRAVENOUS; SUBCUTANEOUS at 21:46

## 2021-11-15 RX ADMIN — VORICONAZOLE 200 MG: 40 POWDER, FOR SUSPENSION ORAL at 23:42

## 2021-11-15 RX ADMIN — INSULIN LISPRO 2 UNITS: 100 INJECTION, SOLUTION INTRAVENOUS; SUBCUTANEOUS at 12:58

## 2021-11-15 RX ADMIN — ATORVASTATIN CALCIUM 80 MG: 80 TABLET, FILM COATED ORAL at 20:49

## 2021-11-15 RX ADMIN — VORICONAZOLE 200 MG: 40 POWDER, FOR SUSPENSION ORAL at 10:44

## 2021-11-15 RX ADMIN — VALPROIC ACID 130 MG: 250 SOLUTION ORAL at 14:40

## 2021-11-15 RX ADMIN — VALPROIC ACID 130 MG: 250 SOLUTION ORAL at 20:49

## 2021-11-15 RX ADMIN — HEPARIN SODIUM 5000 UNITS: 5000 INJECTION INTRAVENOUS; SUBCUTANEOUS at 14:40

## 2021-11-15 RX ADMIN — CASTOR OIL AND BALSAM, PERU: 788; 87 OINTMENT TOPICAL at 12:52

## 2021-11-15 RX ADMIN — MIDODRINE HYDROCHLORIDE 5 MG: 5 TABLET ORAL at 20:49

## 2021-11-15 RX ADMIN — HEPARIN SODIUM 5000 UNITS: 5000 INJECTION INTRAVENOUS; SUBCUTANEOUS at 05:57

## 2021-11-15 RX ADMIN — INSULIN LISPRO 4 UNITS: 100 INJECTION, SOLUTION INTRAVENOUS; SUBCUTANEOUS at 17:33

## 2021-11-15 RX ADMIN — ASPIRIN 81 MG: 81 TABLET, CHEWABLE ORAL at 10:44

## 2021-11-15 RX ADMIN — SODIUM CHLORIDE, PRESERVATIVE FREE 10 ML: 5 INJECTION INTRAVENOUS at 20:49

## 2021-11-15 RX ADMIN — INSULIN GLARGINE 10 UNITS: 100 INJECTION, SOLUTION SUBCUTANEOUS at 21:47

## 2021-11-15 RX ADMIN — MEROPENEM 1000 MG: 1 INJECTION, POWDER, FOR SOLUTION INTRAVENOUS at 08:33

## 2021-11-15 RX ADMIN — HEPARIN SODIUM 5000 UNITS: 5000 INJECTION INTRAVENOUS; SUBCUTANEOUS at 22:10

## 2021-11-15 RX ADMIN — SODIUM CHLORIDE, PRESERVATIVE FREE 10 ML: 5 INJECTION INTRAVENOUS at 08:34

## 2021-11-15 RX ADMIN — CASTOR OIL AND BALSAM, PERU: 788; 87 OINTMENT TOPICAL at 21:37

## 2021-11-15 RX ADMIN — MIDODRINE HYDROCHLORIDE 5 MG: 5 TABLET ORAL at 10:44

## 2021-11-15 ASSESSMENT — PAIN SCALES - PAIN ASSESSMENT IN ADVANCED DEMENTIA (PAINAD)
BREATHING: 0
TOTALSCORE: 0
BODYLANGUAGE: 0
BODYLANGUAGE: 0
BREATHING: 0
TOTALSCORE: 0
BREATHING: 0
TOTALSCORE: 0
NEGVOCALIZATION: 0
BREATHING: 0
FACIALEXPRESSION: 0
CONSOLABILITY: 0
NEGVOCALIZATION: 0
BODYLANGUAGE: 0
BREATHING: 0
CONSOLABILITY: 0
FACIALEXPRESSION: 0
NEGVOCALIZATION: 0
FACIALEXPRESSION: 0
CONSOLABILITY: 0
BREATHING: 0
BODYLANGUAGE: 0
NEGVOCALIZATION: 0
TOTALSCORE: 0
TOTALSCORE: 0
BODYLANGUAGE: 0
FACIALEXPRESSION: 0
BODYLANGUAGE: 0
CONSOLABILITY: 0
NEGVOCALIZATION: 0
TOTALSCORE: 0
BODYLANGUAGE: 0
BREATHING: 0
FACIALEXPRESSION: 0
TOTALSCORE: 0
FACIALEXPRESSION: 0
FACIALEXPRESSION: 0
CONSOLABILITY: 0
FACIALEXPRESSION: 0
TOTALSCORE: 0
CONSOLABILITY: 0
BODYLANGUAGE: 0
CONSOLABILITY: 0
NEGVOCALIZATION: 0
CONSOLABILITY: 0
NEGVOCALIZATION: 0
NEGVOCALIZATION: 0
BREATHING: 0
BREATHING: 0
CONSOLABILITY: 0
NEGVOCALIZATION: 0
FACIALEXPRESSION: 0
TOTALSCORE: 0
BODYLANGUAGE: 0

## 2021-11-15 ASSESSMENT — ENCOUNTER SYMPTOMS
DIARRHEA: 0
ABDOMINAL PAIN: 0
VOMITING: 0
NAUSEA: 0

## 2021-11-15 NOTE — PROGRESS NOTES
4 Eyes Admission Assessment     I agree as the admission nurse that 2 RN's have performed a thorough Head to Toe Skin Assessment on the patient. ALL assessment sites listed below have been assessed on admission. Areas assessed by both nurses:   [x]   Head, Face, and Ears   [x]   Shoulders, Back, and Chest  [x]   Arms, Elbows, and Hands   [x]   Coccyx, Sacrum, and Ischium  [x]   Legs, Feet, and Heels        Does the Patient have Skin Breakdown? Yes Small scattered abrasions on extremities. Buttock reddened with stage 2 pressure ulcer on coccyx. Small purple areas that appear to be deep tissue injuries on right and left lateral sides of right foot. Slow blanchable erythema on right foot. Large deep tissue injury on right heel.          Solitario Prevention initiated:  YES  Wound Care Orders initiated:  YES      [facility] nurse consulted for Pressure Injury (Stage 3,4, Unstageable, DTI, NWPT, and Complex wounds) or Solitario score 18 or lower:  YES      Nurse 1 eSignature:Electronically signed by Neda Chu RN on 11/15/2021 at 1:21 AM  **SHARE this note so that the co-signing nurse is able to place an eSignature**    Nurse 2 eSignature: Electronically signed by Billy Mcintosh RN on 11/15/21 at 2:35 AM EST

## 2021-11-15 NOTE — PROGRESS NOTES
Telemetry Appears to be NSR with intermittent runs of Atrial flutter. Patient appears asymptomatic. Vital signs stable. Dr Nile Brasher notified. Will continue to monitor.

## 2021-11-15 NOTE — PROGRESS NOTES
Patient alert to self. Follows commands with her limitations. IV RAC, for intermittent infusions. Vitals wnl, except temp at 99.0, O2 off, O2 sats maintaining 95%. Gtube flushed, medications given. Metoprolol XL, sent message to Dr Adore Perla and Anna Marie Garcia, requesting dose that can be crushed. Speech suggesting pureed diet with honey liquids, sent message requesting to Dr Anna Marie Garcia. Turned and repositioned q 2 hours. Specialty bed ordered, patient with limited mobility and with stage 2 and DTI's. Purwick in use for elimination. Bed alarm on, call light in reach, door open.

## 2021-11-15 NOTE — PLAN OF CARE
Problem: Nutrition  Goal: Optimal nutrition therapy  Note: Nutrition Problem #1: Inadequate oral intake  Intervention: Food and/or Nutrient Delivery: Continue NPO, Start Tube Feeding  Nutritional Goals: Pt will tolerate EN @ goal to meet 100% of nutrition needs

## 2021-11-15 NOTE — PROGRESS NOTES
[facility] Continence Nurse  Consult Note       NAME:  Cindi Simmons  MEDICAL RECORD NUMBER:  [id number]  AGE: 80 y.o. GENDER: female  : 1950  TODAY'S DATE:  11/15/2021    Subjective   Reason for WOCN Evaluation and Assessment: R Heel -DTI, R Lateral Foot - DTI, Coccyx/R Buttock - Stage 2      Cindi Simmons is a 80 y.o. female referred by:   [] Physician  [x] Nursing  [] Other:     Wound Identification:  Wound Type: pressure  Contributing Factors: diabetes, chronic pressure, decreased mobility, shear force, obesity and incontinence of urine    Wound History: Yusuf Villafuerte is a 79 yo F PMH ESRD on HD, CAD s/p CABG, DMII, HLD, CVA w/ right-sided deficits, HTN, ischemic bowel s/p colostomy (), G-tube dependent feeds & meds s/p PEG placement 21 who presented from nursing home for altered mental status. Nursing facility reported that pt had fever and was unresponsive, however was responsive and back to her baseline when EMS arrived. She was brought to ED. Temp 99.2, no leukocytosis, Lactic 2.3, pro-BNP 22,247 (decreased from prior), troponin 0.25, EKG without acute ischemic changes. UA with >100 WBC, large leukocyte esterase, epithelial cells. CT head, abdomen, pelvis, and CXR negative for acute process. Pt was given 500 mL bolus LR, 1 dose of Rocephin and Voriconazole per G tube. Will be admitted for workup of AMS.    Of note she presented yesterday () to ED for concern of gastrostomy tube dislodgement, which was assessed, flushed, and pt was sent back to nursing home. Was also recently admitted in October for urosepsis with culture that grew Vanessa glabrata  Current Wound Care Treatment: R Heel and R Lateral Foot - Venelex  Coccyx/R Buttock - Foam    Patient Goal of Care:  [x] Wound Healing  [] Odor Control  [] Palliative Care  [] Pain Control   [] Other:         PAST MEDICAL HISTORY        Diagnosis Date    CAD (coronary artery disease)     s/p CABG .  Cath 9/10 1/3 grafts occluded. Patent LIMA-LAD, patent SVG-OM, 60% distal LAD lesion distal to anastamosis, occluded SVG-LCX,, 70% prox native LCx, diffuse RCA dz. Declines redo CABG. will consider PCI of LAD and LCX    [facility]ot[facility]     Diabetes mellitus ([facility]     followed by PCP    Hyperlipidemia     rec semi annual lipids w/ LDL goal <70    Lung mass     likely benign    Syncope     secondary to medications       PAST SURGICAL HISTORY    Past Surgical History:   Procedure Laterality Date    BREAST SURGERY      left breast tumor removed    CARDIAC SURGERY  2000    CABG    CHOLECYSTECTOMY      CORONARY ANGIOPLASTY WITH STENT PLACEMENT  2019    CORONARY ARTERY BYPASS GRAFT      DIAGNOSTIC CARDIAC CATH LAB PROCEDURE      KNEE SURGERY      UPPER GASTROINTESTINAL ENDOSCOPY N/A 10/14/2019    EGD DIAGNOSTIC ONLY performed by Nando Contreras MD at [place] [address] N/A 10/18/2021    EGD BIOPSY performed by Adriana Frias MD at [address] [facility]    Family History   Problem Relation Age of Onset    Stroke Mother     Heart Disease Father        SOCIAL HISTORY    Social History     Tobacco Use    Smoking status: Never Smoker    Smokeless tobacco: Never Used   Vaping Use    Vaping Use: Never used   Substance Use Topics    Alcohol use: No    Drug use: No       ALLERGIES    Allergies   Allergen Reactions    Iodine Hives       MEDICATIONS    No current facility-administered medications on file prior to encounter.      Current Outpatient Medications on File Prior to Encounter   Medication Sig Dispense Refill    famotidine (PEPCID) 10 MG tablet Take 10 mg by mouth daily      ipratropium-albuterol (DUONEB) 0.5-2.5 (3) MG/3ML SOLN nebulizer solution Inhale 1 vial into the lungs every 4 hours as needed for Shortness of Breath      insulin glargine (SEMGLEE) 100 UNIT/ML injection vial Inject 10 Units into the skin 2 times daily 10 mL 3    metoprolol succinate (TOPROL XL) 25 MG extended release tablet Take 1 tablet by mouth daily 30 tablet 3    midodrine (PROAMATINE) 5 MG tablet 1 tablet by PEG Tube route 3 times daily 90 tablet 3    acetaminophen (TYLENOL) 325 MG tablet 650 mg by Per G Tube route every 4 hours as needed for Pain or Fever      insulin lispro (HUMALOG) 100 UNIT/ML injection vial Inject 0-10 Units into the skin every 6 hours Per Sliding scale      aspirin 81 MG chewable tablet 81 mg by Per G Tube route daily      vitamin B complex (NATURES BLEND B COMPLEX) TABS tablet 1 tablet by Per G Tube route daily      atorvastatin (LIPITOR) 80 MG tablet 80 mg by Per G Tube route nightly      OLANZapine (ZYPREXA) 10 MG tablet 10 mg by Per G Tube route nightly      valproic acid (DEPAKENE) 250 MG/5ML SOLN oral solution 130 mg by Per G Tube route 3 times daily      gabapentin (NEURONTIN) 100 MG capsule Take 100 mg by mouth 3 times daily.          Objective: Confused, oriented to name, lying in bed, depends and PureWick, colostomy intact    /70   Pulse 95   Temp 99.3 °F (37.4 °C) (Oral)   Resp 18   Ht 5' 2\" (1.575 m)   Wt 136 lb (61.7 kg)   LMP  (LMP Unknown)   SpO2 100%   BMI 24.87 kg/m²     LABS:  WBC:    Lab Results   Component Value Date    WBC 8.9 11/15/2021     H/H:    Lab Results   Component Value Date    HGB 7.5 11/15/2021    HCT 21.8 11/15/2021     PTT:    Lab Results   Component Value Date    APTT 21.5 10/15/2021   [APTT}  PT/INR:    Lab Results   Component Value Date    PROTIME 10.7 10/15/2021    INR 0.95 10/15/2021     HgBA1c:    Lab Results   Component Value Date    LABA1C 6.0 10/15/2021       Assessment: R Heel - dry and intact blood filled blister, beginning to appear to be drying up  R Lateral Foot - dry and intact purple non blanching area  Coccyx/R Buttocks - denuded area with scant bleeding, evidence of shearing noted   Solitario Risk Score: Solitario Scale Score: 12    Patient Active Problem List   Diagnosis Code    Peripheral vascular disease ([facility] I73.9    Shortness of breath R06.02    Other chest pain R07.89    Diabetes mellitus ([facility]) E11.9    Carotid stenosis I65.29    CAD (coronary artery disease) I25.10    Hyperlipidemia E78.5    Vasovagal syncope R55    Pure hypercholesterolemia E78.00    Status post left heart catheterization Z98.890    Anemia D64.9    NSTEMI (non-ST elevated myocardial infarction) ([facility]) I21.4    CAD S/P percutaneous coronary angioplasty I25.10, Z98.61    Essential hypertension I10    Unstable angina ([facility]) I20.0    Fatigue X12.52    Complication associated with dialysis catheter T82. 9XXA    ESRD (end stage renal disease) ([facility] N18.6    Anemia in ESRD (end-stage renal disease) ([facility]) N18.6, D63.1    Electrolyte imbalance E87.8    Sepsis ([facility]) A41.9    Altered mental status R41.82       Measurements:  Wound 11/14/21 Heel Right right heel has large black deep tissue injury (Active)   Wound Image   11/15/21 0952   Wound Etiology Deep tissue/Injury 11/15/21 0952   Dressing Status Dry; Intact 11/15/21 0952   Wound Cleansed Not Cleansed 11/15/21 0952   Dressing/Treatment Pharmaceutical agent (see MAR) 11/15/21 0952   Offloading for Diabetic Foot Ulcers Offloading boot 11/15/21 0952   Dressing Change Due 11/15/21 11/15/21 0952   Wound Length (cm) 5 cm 11/15/21 0952   Wound Width (cm) 5.5 cm 11/15/21 0952   Wound Depth (cm) 0 cm 11/15/21 0952   Wound Surface Area (cm^2) 27.5 cm^2 11/15/21 0952   Wound Volume (cm^3) 0 cm^3 11/15/21 0952   Wound Assessment Blood filled blister; Dry 11/15/21 0952   Drainage Amount None 11/15/21 0952   Odor None 11/15/21 0952   Tyesha-wound Assessment Dry/flaky 11/15/21 0952   Margins Attached edges; Defined edges 11/15/21 0952   Number of days: 0    R Heel:         Wound 11/14/21 Foot Right; Lateral (Active)   Wound Image   11/15/21 0952   Wound Etiology Deep tissue/Injury 11/15/21 0952   Dressing Status Dry;  Intact 11/15/21 0952   Wound Cleansed Not Cleansed 11/15/21 0178 Dressing/Treatment Pharmaceutical agent (see MAR) 11/15/21 0952   Offloading for Diabetic Foot Ulcers Offloading boot 11/15/21 0952   Dressing Change Due 11/15/21 11/15/21 0952   Wound Length (cm) 0.7 cm 11/15/21 0952   Wound Width (cm) 0.4 cm 11/15/21 0952   Wound Depth (cm) 0 cm 11/15/21 0952   Wound Surface Area (cm^2) 0.28 cm^2 11/15/21 0952   Wound Volume (cm^3) 0 cm^3 11/15/21 0952   Wound Assessment Dry; Purple/maroon 11/15/21 0952   Drainage Amount None 11/15/21 0952   Odor None 11/15/21 0952   Tyesha-wound Assessment Dry/flaky 11/15/21 0952   Margins Attached edges; Defined edges 11/15/21 0952   Number of days: 0    R Lateral Foot:         Wound 11/14/21 Coccyx stage 2 pressure ulcer with surrounding redness (Active)   Wound Image   11/15/21 0952   Wound Etiology Pressure Stage  2 11/15/21 0952   Dressing Status New dressing applied 11/15/21 0952   Wound Cleansed Other (Comment) 11/15/21 0952   Dressing/Treatment Foam 11/15/21 0952   Dressing Change Due 11/18/21 11/15/21 0952   Wound Length (cm) 2.5 cm 11/15/21 0952   Wound Width (cm) 2 cm 11/15/21 0952   Wound Depth (cm) 0.1 cm 11/15/21 0952   Wound Surface Area (cm^2) 5 cm^2 11/15/21 0952   Wound Volume (cm^3) 0.5 cm^3 11/15/21 0952   Wound Assessment Bleeding; Pink/red 11/15/21 0952   Drainage Amount Scant 11/15/21 0952   Drainage Description Sanguinous 11/15/21 0952   Odor None 11/15/21 0952   Tyesha-wound Assessment Blanchable erythema 11/15/21 0952   Margins Attached edges; Defined edges 11/15/21 0952   Number of days: 0   Coccyx/R Buttock:      Response to treatment:  Well tolerated by patient.      Pain Assessment:  Severity:  0 / 10  Quality of pain: N/A  Wound Pain Timing/Severity: none  Premedicated: No    Plan   Plan of Care: Wound 11/14/21 Heel Right right heel has large black deep tissue injury-Dressing/Treatment: Pharmaceutical agent (see MAR) (Venelex)  Wound 11/14/21 Foot Right; Lateral-Dressing/Treatment: Pharmaceutical agent (see MAR) (Venelex)  Wound 11/14/21 Coccyx stage 2 pressure ulcer with surrounding redness-Dressing/Treatment: Foam   Recommendation: R Lateral Foot and R Heel - clean with NS, dry, apply Venelex BID, use offloading boots  Coccyx/R Buttock - clean with barrier cream wipes, foam dressing to be changed every 3 days  Reposition pt every 2 hours  Call Wound Care for deterioration [phone]    Specialty Bed Required : No   [] Low Air Loss   [] Pressure Redistribution  [] Fluid Immersion  [] Bariatric  [] Total Pressure Relief  [] Other:     Current Diet: Diet NPO  Dietician consult:  No    Discharge Plan:  Placement for patient upon discharge: skilled nursing    Patient appropriate for Outpatient [address]: Yes    Referrals:  [x]    [facility]  [] Supplies  [] Other    Patient/Caregiver Teaching:  Level of patient/caregiver understanding able to:   [] Indicates understanding       [] Needs reinforcement  [] Unsuccessful      [] Verbal Understanding  [] Demonstrated understanding       [] No evidence of learning  [] Refused teaching         [x] N/A       Electronically signed by Marlene hCou RN, CWOCN on 11/15/2021 at 9:56 AM

## 2021-11-15 NOTE — CONSULTS
Comprehensive Nutrition Assessment    RECOMMENDATIONS:  1. PO DIET: Continue Diet NPO   2. Recommend EN formula Renal  Nepro  @ goal rate 40 ml/hr to provide 960 ml total volume, 1728 kcal, 78 g protein and 698 ml free water. 3. Initiate EN @ 20 mL/hr and as tolerated, increase by 20 mL/hr q 4 hours until goal of 40 mL/hr is met. 4. Recommend water bolus 150 ml every 4 hours. NUTRITION ASSESSMENT:   Type and Reason for Visit:  Type and Reason for Visit: Initial, Consult   Nutritional summary & status: RD consulted for TF orders/management. Noted pt with G-tube and home TF. Per chart review pt nonverbal although pt responded \"yes\" \"no\" and \"why\" during encounter. RODNEY weight status as CBW stated. RD to monitor EN rate/tolerance and nutrition status throughout adm.       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), G-tube dependent feeds s/p PEG placement 9/7/21    MALNUTRITION ASSESSMENT  Context of Malnutrition: Acute Illness   Malnutrition Status: Insufficient data    NUTRITION DIAGNOSIS   · Inadequate oral intake related to altered GI structure as evidenced by nutrition support - enteral nutrition      NUTRITION INTERVENTION  Food and/or Nutrient Delivery:  Continue NPO, Start Tube Feeding  Nutrition Education/Counseling:  No recommendation at this time   Goals:  Pt will tolerate EN @ goal to meet 100% of nutrition needs       Nutrition Monitoring and Evaluation:   Food/Nutrient Intake Outcomes:  Enteral Nutrition Intake/Tolerance  Physical Signs/Symptoms Outcomes:  Biochemical Data, Weight     OBJECTIVE DATA: Significant to nutrition assessment  · Nutrition-Focused Physical Findings: Nutrition Related Findings: colostomy - loose stool 11/15   · Labs: Reviewed  · Meds: Reviewed  · Wounds: Wound Type: Deep Tissue Injury     CURRENT NUTRITION THERAPIES  Diet NPO     Current Tube Feeding (TF) Orders:  · Feeding Route: PEG  · Formula: Renal Formula  · Schedule: Continuous  · Additives/Modulars:    · Water Flushes: 150 ml q4h  · Goal TF & Flush Orders Provides: 40 ml/hr to provide 960 ml total volume, 1728 kcal, 78 g protein and 698 ml free water. PO Intake: Average Meal Intake: NPO   PO Supplement Intake:Average Supplements Intake: NPO  IVF: n/a     ANTHROPOMETRICS  Current Height: 5' 2\" (157.5 cm)  Current Weight: 136 lb (61.7 kg)    Admission weight: 136 lb (61.7 kg)  Ideal Body Weight (lbs) (Calculated): 110 lbs (Ideal Body Weight (Kg) (Calculated): 50 kg)  Usual Bodyweight RODNEY - wt hx fluctuates    Weight Changes RODNEY - CBW stated       BMI BMI (Calculated): 24.9    Wt Readings from Last 50 Encounters:   11/14/21 136 lb (61.7 kg)   11/13/21 130 lb (59 kg)   10/20/21 128 lb 1.4 oz (58.1 kg)   10/07/21 113 lb 5.1 oz (51.4 kg)       COMPARATIVE STANDARDS  Energy (kcal):  9041-5918 (30-35); Weight Used for Energy Requirements:  Ideal (50)     Protein (g):  65-75; Weight Used for Protein Requirements:  Ideal (1.3-1.5)        Fluid (ml/day):  9019-4031; Method Used for Fluid Requirements:  1 ml/kcal      The patient will still be monitored per nutrition standards of care. Consult dietitian if nutrition interventions essential to patient care is needed.      Jackie Amanda, 67 [address], [address] [place] [address] Drive:  [phone]  Office:  [phone]

## 2021-11-15 NOTE — PROGRESS NOTES
Meropenem is ordered for patient. This medication is renally eliminated, and should be dose reduced for patients with ESRD on HD. Will change to 500mg IV q24h per renal dose adjustment policy. Pharmacy will continue to monitor renal function and adjust dose as necessary.     Please call with questions--  Thanks--  Manda Rebollar, PharmD, [id number]  [id number] ([facility])   11/15/2021 8:38 AM

## 2021-11-15 NOTE — PROGRESS NOTES
Pharmacy Note - Renal Dosing    Meropenem ordered for treatment of sepsis. Per [facility] Renal Dose Adjustment Policy, 1 gram IV Q8 hours will be changed to 1 gram IV Q12 hours. Estimated Creatinine Clearance: Estimated Creatinine Clearance: 35 mL/min (A) (based on SCr of 1.3 mg/dL (H)). Dialysis Status, LEOBARDO, CKD: n/a    Rationale for Adjustment: Agent is renally eliminated. Current renal function warrants reduced dosing frequency. Pharmacy will continue to monitor renal function and adjust dose as necessary. Please call with any questions.     Floyd Monreal, PharmD, BCPS  11/14/2021  Wireless: [phone]

## 2021-11-15 NOTE — PROGRESS NOTES
Progress Note  PGY-1    Admit Date: 11/14/2021  Day: 1  Diet: Diet NPO    CC: AMS    Interval history:     Grady Ventura is a 80 y.o. female with PMHx ESRD on HD (T/TH/S), CAD, type II DM, old CVA with right-sided deficits, hypertension, ischemic bowel status post colostomy (7/21), G-tube dependent feeds and meds status post PEG(9/21) who presented from a nursing home for AMS. In the ED, temp 99.2, lactic acid 2.3, proBNP 24930, troponin of 0.25, EKG without ischemic changes. Urinalysis was done which showed more than 100 WBC with large leukocyte esterase and the presence of epithelial cells. CT head abdomen pelvis and CXR negative for any acute processes. Patient was given Rocephin and voriconazole via G-tube. Pt seen at bedside Alert and oriented to name and place  Hemodynamically stable. Will respond to questions asked. Denies any chest pain, SOB, palpitations.      Medications:     Scheduled Meds:   [START ON 11/16/2021] meropenem  500 mg IntraVENous Q24H    voriconazole  200 mg Per G Tube 2 times per day    sodium chloride flush  5-40 mL IntraVENous 2 times per day    heparin (porcine)  5,000 Units SubCUTAneous 3 times per day    aspirin  81 mg Per G Tube Daily    atorvastatin  80 mg Per G Tube Nightly    metoprolol succinate  25 mg Oral Daily    midodrine  5 mg PEG Tube TID    valproic acid  130 mg Per G Tube TID    insulin lispro  0-12 Units SubCUTAneous TID WC    insulin lispro  0-6 Units SubCUTAneous Nightly    insulin glargine  10 Units SubCUTAneous Nightly     Continuous Infusions:   sodium chloride      dextrose       PRN Meds:sodium chloride flush, sodium chloride, polyethylene glycol, acetaminophen **OR** acetaminophen, glucose, dextrose, glucagon (rDNA), dextrose    Objective:   Vitals:   T-max:  Patient Vitals for the past 8 hrs:   BP Temp Temp src Pulse Resp SpO2   11/15/21 0807 124/70 99.3 °F (37.4 °C) Oral 95 18 100 %   11/15/21 0351 (!) 102/53 97.3 °F (36.3 °C) in the last 72 hours. INR: No results for input(s): INR in the last 72 hours. Lactate: No results for input(s): LACTATE in the last 72 hours. Cultures:  -----------------------------------------------------------------  RAD:   CT ABDOMEN PELVIS WO CONTRAST Additional Contrast? None   Final Result      Decreased right pleural effusion with persistent right lower lobe consolidation/atelectasis. Unchanged rounded hypodense lesion in the right lobe of the liver. Postsurgical changes in the abdomen, with gastrostomy and ileostomy noted. No new acute findings in the abdomen or pelvis. CT Head WO Contrast   Final Result      Chronic atrophic and periventricular ischemic changes of the brain without acute hemorrhage, edema or hydrocephalus. No subdural or epidural hematoma appreciated       XR CHEST PORTABLE   Final Result      1. No acute process or consolidation   2. Right IJ central venous catheter remains in place with the tips at the atriocaval junction. Improved aeration noted in the right lung compared to prior study                   Assessment/Plan:   Tiki Valente is a 80 y.o. female with PMHx ESRD on HD (T/TH/S), CAD, type II DM, old CVA with right-sided deficits, hypertension, ischemic bowel status post colostomy (7/21), G-tube dependent feeds and meds status post PEG(9/21) who presented from a nursing home for AMS. Urinary Tract infection  Past Ucx (10/15/21) w/ Candida globrata treated with voriconazole   U/A: Cloudy, large leukocyte esterase, more than 100 WBC   Likely contributory for mental status change  CT head w/ no acute hemorrhage   -Follow-up urine culture  -Follow-up blood culture x2  -Follow-up respiratory culture  -Abx: Merem 500mg daily  -ID consulted    NSTEMI Type 2  Demand ischemia and pt with ESRD  EKG with no ischemic changes, patient with no chest pain.    Repeat EKG; no significant changes compared to previous EKG in the ED  Troponin: 0.25 -> 0.26 ->0.28  -Discontinue trend    ESRD on HD   T/Th/Sat  -Continue home midodrine  -Nephrology consulted    DM type 2  Pt being fed via G-tube. -POCT  -Hypoglycemia protocol  -MDSSI  -Lantus 10 u Nightly   -Dietician consulted    Anemia  Likely of CKD  Hb 7.5  -Daily CBC    HLD  -Atorvastatin nightly    Code Status: Limited (yes to resuscitative meds)  FEN: Diet NPO  PPX: heparin  DISPO: [facility]    Hilary Severs, MD, PGY-1  Internal Medicine Resident  Contact via [facility]  11/15/21  8:41 AM    This patient has been staffed and discussed with Matt Poole MD.     Patient seen and examined, labs and imaging studies reviewed, agree with assessment and plan as outlined above. Continue with current care and plan. Discussed case with patients nurse, discussed case with care team, discussed plan.       MD Rigo Medina

## 2021-11-15 NOTE — PROGRESS NOTES
Ostomy Referral Progress Note      NAME:  Di Alcantar  MEDICAL RECORD NUMBER:  [id number]  AGE: 80 y.o. GENDER:  female  :  1950  TODAY'S DATE:  11/15/2021    Subjective     Di Alcantar is a 80 y.o. female referred by:   [] Physician  [x] Nursing  [] Other:     Established Colostomy  Stoma: 25 mm x 35 mm  Barrier: 2 piece flat (green) #[id number] with pouch #[id number]    Dr. Chinyere Santos 21 Exploratory Laparotomy, Low Anterior Extended Colonic Resection, R Transverse Colon Colostomy, Takedown of Splenic Flexure d/t patchy ischemia with necrosis of the entire colon, sigmoid colon, descending colon, and splenic flexure    PAST MEDICAL HISTORY:        Diagnosis Date    CAD (coronary artery disease)     s/p CABG . Cath 9/10 1/3 grafts occluded. Patent LIMA-LAD, patent SVG-OM, 60% distal LAD lesion distal to anastamosis, occluded SVG-LCX,, 70% prox native LCx, diffuse RCA dz. Declines redo CABG. will consider PCI of LAD and LCX    Carotid artery disease [facility]     Diabetes mellitus ([facility]     followed by PCP    Hyperlipidemia     rec semi annual lipids w/ LDL goal <70    Lung mass     likely benign    Syncope     secondary to medications       MEDICATIONS:    No current facility-administered medications on file prior to encounter.      Current Outpatient Medications on File Prior to Encounter   Medication Sig Dispense Refill    famotidine (PEPCID) 10 MG tablet Take 10 mg by mouth daily      ipratropium-albuterol (DUONEB) 0.5-2.5 (3) MG/3ML SOLN nebulizer solution Inhale 1 vial into the lungs every 4 hours as needed for Shortness of Breath      insulin glargine (SEMGLEE) 100 UNIT/ML injection vial Inject 10 Units into the skin 2 times daily 10 mL 3    metoprolol succinate (TOPROL XL) 25 MG extended release tablet Take 1 tablet by mouth daily 30 tablet 3    midodrine (PROAMATINE) 5 MG tablet 1 tablet by PEG Tube route 3 times daily 90 tablet 3    acetaminophen (TYLENOL) 325 MG tablet 650 mg by Per G Tube route every 4 hours as needed for Pain or Fever      insulin lispro (HUMALOG) 100 UNIT/ML injection vial Inject 0-10 Units into the skin every 6 hours Per Sliding scale      aspirin 81 MG chewable tablet 81 mg by Per G Tube route daily      vitamin B complex (NATURES BLEND B COMPLEX) TABS tablet 1 tablet by Per G Tube route daily      atorvastatin (LIPITOR) 80 MG tablet 80 mg by Per G Tube route nightly      OLANZapine (ZYPREXA) 10 MG tablet 10 mg by Per G Tube route nightly      valproic acid (DEPAKENE) 250 MG/5ML SOLN oral solution 130 mg by Per G Tube route 3 times daily      gabapentin (NEURONTIN) 100 MG capsule Take 100 mg by mouth 3 times daily. ALLERGIES:    Allergies   Allergen Reactions    Iodine Hives       PAST SURGICAL HISTORY:    Past Surgical History:   Procedure Laterality Date    BREAST SURGERY      left breast tumor removed    CARDIAC SURGERY  2000    CABG    CHOLECYSTECTOMY      CORONARY ANGIOPLASTY WITH STENT PLACEMENT  2019    CORONARY ARTERY BYPASS GRAFT      DIAGNOSTIC CARDIAC CATH LAB PROCEDURE      KNEE SURGERY      UPPER GASTROINTESTINAL ENDOSCOPY N/A 10/14/2019    EGD DIAGNOSTIC ONLY performed by Pastor Cliff MD at [facility] 10/18/2021    EGD BIOPSY performed by Christel Melgoza MD at [address]:    family history includes Heart Disease in her father; Stroke in her mother.     SOCIAL HISTORY:    Social History     Tobacco Use    Smoking status: Never Smoker    Smokeless tobacco: Never Used   Vaping Use    Vaping Use: Never used   Substance Use Topics    Alcohol use: No    Drug use: No       LABS:  WBC:    Lab Results   Component Value Date    WBC 8.9 11/15/2021     H/H:    Lab Results   Component Value Date    HGB 7.5 11/15/2021    HCT 21.8 11/15/2021     BMP:    Lab Results   Component Value Date     11/15/2021    K 3.5 11/15/2021     11/15/2021    CO2 28 11/15/2021    BUN 51 11/15/2021    LABALBU 3.2 11/14/2021    CREATININE 1.3 11/15/2021    CALCIUM 8.8 11/15/2021    GFRAA 49 11/15/2021    LABGLOM 40 11/15/2021    GLUCOSE 150 11/15/2021     PTT:    Lab Results   Component Value Date    APTT 21.5 10/15/2021   [APTT}  PT/INR:    Lab Results   Component Value Date    PROTIME 10.7 10/15/2021    INR 0.95 10/15/2021       Objective: Confused, oriented to name, current appliance intact and seal, unsure when placed, pt does not know    /70   Pulse 95   Temp 99.3 °F (37.4 °C) (Oral)   Resp 18   Ht 5' 2\" (1.575 m)   Wt 136 lb (61.7 kg)   LMP  (LMP Unknown)   SpO2 100%   BMI 24.87 kg/m²     Solitario Risk Score Solitario Scale Score: 12    Patient Active Problem List   Diagnosis Code    Peripheral vascular disease ([facility]) I73.9    Shortness of breath R06.02    Other chest pain R07.89    Diabetes mellitus ([facility]) E11.9    Carotid stenosis I65.29    CAD (coronary artery disease) I25.10    Hyperlipidemia E78.5    Vasovagal syncope R55    Pure hypercholesterolemia E78.00    Status post left heart catheterization Z98.890    Anemia D64.9    NSTEMI (non-ST elevated myocardial infarction) ([facility]) I21.4    CAD S/P percutaneous coronary angioplasty I25.10, Z98.61    Essential hypertension I10    Unstable angina ([facility]) I20.0    Fatigue U66.11    Complication associated with dialysis catheter T82. 9XXA    ESRD (end stage renal disease) ([facility] N18.6    Anemia in ESRD (end-stage renal disease) ([facility]) N18.6, D63.1    Electrolyte imbalance E87.8    Sepsis ([facility]) A41.9    Altered mental status R41.82       Assessment: Stoma is pink, moist and protrudes, peristomal skin intact and clean      Colostomy RLQ (Active)   Stomal Appliance 2 piece; Clean; Dry; Intact; Changed 11/15/21 1004   Flange Size (inches) 1.5 Inches 11/15/21 1004   Stoma  Assessment Pink; Protrudes;  Moist 11/15/21 1004   Mucocutaneous Junction Intact 11/15/21 1004 Peristomal Assessment Clean; Intact 11/15/21 1004   Treatment Bag change 11/15/21 1004   Stool Appearance Loose 11/15/21 1004   Stool Color Brown 11/15/21 1004   Stool Amount Small 11/15/21 1004   Output (mL) 100 ml 10/19/21 0441   Number of days:        No intake or output data in the 24 hours ending 11/15/21 [address] for Ostomy Care:  Stoma Care -Empty appliance when 1/3 to 1/2 full. Cleanse inside and outside of the drain spout prior to rolling closed. Change appliance every 3-5 days or 1-2 times a week.   Call for problems with seal [phone]    Ostomy Plan of Care  [x] Supplies/Instructions left in room  [] Patient using home supplies  [x] Brand/supplies at bedside Coloplast  [] Current pouching system     Current Diet: Diet NPO  Dietician consult:  No    Discharge Plan:  Placement for patient upon discharge: intermediate care facility    Outpatient visit plan No  Supplies given Yes   Samples requested No    Referrals:  [x]   [] [facility]  [] Supplies  [] Other      Patient/Caregiver Teaching:  Written Instructions given to patient/family  Teaching provided:  [] Reviewed GI and A&P        [] Supplies  [] Pouch emptying      [] Manipulate closure  [] Routine Care         [] Comment  [] Pouch maintenance           Level of patient/caregiver understanding able to:  [] Indicates understanding       [] Needs reinforcement  [] Unsuccessful      [] Verbal Understanding  [] Demonstrated understanding       [] No evidence of learning  [] Refused teaching         [x] N/A    Electronically signed by Elizabeth Lentz RN, Angelica Wu on 11/15/2021 at 10:05 AM

## 2021-11-15 NOTE — PROGRESS NOTES
pyriforms with initiation of swallow. No penetration or aspiration noted with puree and honey thick liquids. Puree/honey thick liquid recommended    Date of Eval: 11/15/2021  Evaluating Therapist: RIO Jerome    Current Diet level:  Current Diet : NPO (has tube feeds)  Current Liquid Diet : NPO     Primary Complaint  Patient Complaint: pt states yes when asked if  she wants to eat    Pain:  Pain Assessment  Pain Assessment: none stated or indicated through any means    Reason for Referral  Ema Peck was referred for a bedside swallow evaluation to assess the efficiency of her swallow function, identify signs and symptoms of aspiration and make recommendations regarding safe dietary consistencies, effective compensatory strategies, and safe eating environment. 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 Nursing facility reported that pt had fever and was unresponsive, however was responsive and back to her baseline when EMS arrived. She was brought to ED. Temp 99.2, no leukocytosis, Lactic 2.3, pro-BNP 22,247 (decreased from prior), troponin 0.25, EKG without acute ischemic changes. UA with >100 WBC, large leukocyte esterase, epithelial cells. CT head, abdomen, pelvis, and CXR negative for acute process. Pt was given 500 mL bolus LR, 1 dose of Rocephin and Voriconazole per G tube. Will be admitted for workup of AMS. Of note she presented yesterday (11/13) to ED for concern of gastrostomy tube dislodgement, which was assessed, flushed, and pt was sent back to nursing home. Was also recently admitted in October for urosepsis with culture that grew Vanessa glabrata.  \"    Impression  Dysphagia Diagnosis: Moderate pharyngeal stage dysphagia (per recent MBS)  Dysphagia Impression : Pt alert, cooperative, accepting of PO trials. Pt able to state her name but no other orientation information. Pt analyzed with puree and honey thickened liquid trials (per recent MBS recs). Pt demonstrated adequate labial seal, no anterior loss noted. Pt exhibited no overt signs of aspiration with several trials of both consistencies. Good swalow movement felt upon palpation of anterior neck, no extra dry swallows noted. Pt vocal quality remained clear. As MBS was completed very recently (10-6-21)  and there have been no significant neuro changes, recommend puree/honey thick liquids per results of this (if PO is desired). Dysphagia Outcome Severity Scale: Level 3: Moderate dysphagia- Total assisstance, supervision or strategies. Two or more diet consistencies restricted     Treatment Plan  Requires SLP Intervention: Yes  Duration/Frequency of Treatment: 2-3 x  D/C Recommendations: To be determined     Recommended Diet and Intervention  Diet Solids Recommendation: Dysphagia Pureed (Dysphagia I)  Liquid Consistency Recommendation: Moderately Thick (Honey) - per results of MBS 10/6/21 and bedside eval.   Recommended Form of Meds: Via alternative means of nutrition  Therapeutic Interventions: Oral care; Diet tolerance monitoring; Patient/Family education    Compensatory Swallowing Strategies  Upright as possible for all oral intake  Eat/Feed slowly  Remain upright for 30-45 minutes after meals; Check for pocketing of food     Treatment/Goals  1-The patient will tolerate recommended diet without observed clinical signs of aspiration    2-The patient/caregiver will demonstrate understanding of compensatory strategies for improved swallowing safety  11/15: Educated pt to purpose of visit, s/s of aspiration, concern if aspiration occurs, rationale for diet recommendation/strategies to reduce risk for aspiration and instruction to notify staff if any signs emerge.  Pt   Will benefit from cont education  Cont goal    General  Chart Reviewed: Yes  Behavior/Cognition: Alert; Cooperative  Respiratory Status: O2 via nasual cannula  Communication Observation:  (intermittent decreased intelligiblity)  Follows Directions: Simple  Patient Positioning: Upright in bed  Baseline Vocal Quality: Normal  Volitional Cough: Strong  Prior Dysphagia History: Pt had MBS 10/6/21 with silent aspiration of thin and nectar identified. Puree /honey thick liquid diet recommended. However at facility, pt still on tube feeds only (per phone d/w dietary at [facility]). Consistencies Administered: Dysphagia Pureed (Dysphagia I); Honey - teaspoon; Honey - cup    Vision/Hearing  Vision  Vision: Within Functional Limits  Hearing  Hearing: Within functional limits    Oral Motor Deficits  Oral/Motor  Oral Motor: Within functional limits    Prognosis  Prognosis  Prognosis for safe diet advancement: fair  Barriers to reach goals:  (history and severity of dysphagia)    Education  Patient Education: Pt educated to purpose of visit  Patient Education Response: Needs reinforcement  Safety Devices in place: Yes  Type of devices: Call light within reach       Therapy Time  SLP Individual Minutes  Time In: 1035  Time Out: 1050  Minutes: 15     Plan:  Recommended diet: Trial Puree/honey thick liquids (pt has feeding tube for main source of nutrition at this time)  Pt therapy goal: pt agrees that she would like to eat  Pt dc goal: not able to state  Liliane Arellano M.S./[facility]-SLP #[id number]  Pg.  # [phone]  Needs met prior to leaving room, call light within reach, d/w HENRY Ryan  This document will serve as a dc summary if pt dc prior to next visit  11/15/2021 11:32 AM

## 2021-11-15 NOTE — PROGRESS NOTES
Patient admitted to room from ED. She is awake but nonverbal. She is able to move her right hand slightly on command. She follows no other commands. She keeps her left arm bent at her chest but she can hold it down . Occasional shaking of right hand notice but she was able to stop when a rolled washcloth was placed in it. Vital signs stable. Oxygen in use at 3 L per high flow cannula. O2 saturation 95% Right chest tunnel dialysis catheter in place. Colostomy RUQ draining greenish brown stool. Buttock reddened with Stage 2 pressure ulcer on coccyx. Mepilex dressing intact. Right heel black. Right foot with slow blanchable redness. Purple deep tissue injury on right and left lateral right foot. Prevalon boots in place.

## 2021-11-16 VITALS
WEIGHT: 125.66 LBS | SYSTOLIC BLOOD PRESSURE: 139 MMHG | OXYGEN SATURATION: 98 % | BODY MASS INDEX: 23.12 KG/M2 | DIASTOLIC BLOOD PRESSURE: 68 MMHG | HEIGHT: 62 IN | HEART RATE: 89 BPM | RESPIRATION RATE: 16 BRPM | TEMPERATURE: 97.9 F

## 2021-11-16 LAB
ANION GAP SERPL CALCULATED.3IONS-SCNC: 13 MMOL/L (ref 3–16)
BASOPHILS ABSOLUTE: 0.1 K/UL (ref 0–0.2)
BASOPHILS RELATIVE PERCENT: 0.5 %
BUN BLDV-MCNC: 49 MG/DL (ref 7–20)
CALCIUM SERPL-MCNC: 9.5 MG/DL (ref 8.3–10.6)
CHLORIDE BLD-SCNC: 101 MMOL/L (ref 99–110)
CO2: 30 MMOL/L (ref 21–32)
CREAT SERPL-MCNC: 1.4 MG/DL (ref 0.6–1.2)
EOSINOPHILS ABSOLUTE: 0.1 K/UL (ref 0–0.6)
EOSINOPHILS RELATIVE PERCENT: 0.6 %
GFR AFRICAN AMERICAN: 45
GFR NON-AFRICAN AMERICAN: 37
GLUCOSE BLD-MCNC: 311 MG/DL (ref 70–99)
GLUCOSE BLD-MCNC: 314 MG/DL (ref 70–99)
GLUCOSE BLD-MCNC: 367 MG/DL (ref 70–99)
HCT VFR BLD CALC: 22.4 % (ref 36–48)
HEMOGLOBIN: 7.7 G/DL (ref 12–16)
LIPASE: 54 U/L (ref 13–60)
LYMPHOCYTES ABSOLUTE: 1.3 K/UL (ref 1–5.1)
LYMPHOCYTES RELATIVE PERCENT: 13.2 %
MCH RBC QN AUTO: 33.5 PG (ref 26–34)
MCHC RBC AUTO-ENTMCNC: 34.2 G/DL (ref 31–36)
MCV RBC AUTO: 97.8 FL (ref 80–100)
MONOCYTES ABSOLUTE: 1.5 K/UL (ref 0–1.3)
MONOCYTES RELATIVE PERCENT: 15.2 %
NEUTROPHILS ABSOLUTE: 6.8 K/UL (ref 1.7–7.7)
NEUTROPHILS RELATIVE PERCENT: 70.5 %
PDW BLD-RTO: 18.6 % (ref 12.4–15.4)
PERFORMED ON: ABNORMAL
PERFORMED ON: ABNORMAL
PLATELET # BLD: 184 K/UL (ref 135–450)
PMV BLD AUTO: 9.4 FL (ref 5–10.5)
POTASSIUM REFLEX MAGNESIUM: 3.7 MMOL/L (ref 3.5–5.1)
RBC # BLD: 2.29 M/UL (ref 4–5.2)
SODIUM BLD-SCNC: 144 MMOL/L (ref 136–145)
WBC # BLD: 9.6 K/UL (ref 4–11)

## 2021-11-16 PROCEDURE — 6370000000 HC RX 637 (ALT 250 FOR IP): Performed by: STUDENT IN AN ORGANIZED HEALTH CARE EDUCATION/TRAINING PROGRAM

## 2021-11-16 PROCEDURE — 99233 SBSQ HOSP IP/OBS HIGH 50: CPT | Performed by: INTERNAL MEDICINE

## 2021-11-16 PROCEDURE — 2580000003 HC RX 258: Performed by: STUDENT IN AN ORGANIZED HEALTH CARE EDUCATION/TRAINING PROGRAM

## 2021-11-16 PROCEDURE — 85025 COMPLETE CBC W/AUTO DIFF WBC: CPT

## 2021-11-16 PROCEDURE — 83690 ASSAY OF LIPASE: CPT

## 2021-11-16 PROCEDURE — 6360000002 HC RX W HCPCS: Performed by: STUDENT IN AN ORGANIZED HEALTH CARE EDUCATION/TRAINING PROGRAM

## 2021-11-16 PROCEDURE — 36415 COLL VENOUS BLD VENIPUNCTURE: CPT

## 2021-11-16 PROCEDURE — 80048 BASIC METABOLIC PNL TOTAL CA: CPT

## 2021-11-16 RX ORDER — VORICONAZOLE 40 MG/ML
200 POWDER, FOR SUSPENSION ORAL EVERY 12 HOURS SCHEDULED
Qty: 100 ML | Refills: 0 | Status: SHIPPED | OUTPATIENT
Start: 2021-11-16 | End: 2021-11-26

## 2021-11-16 RX ADMIN — INSULIN LISPRO 10 UNITS: 100 INJECTION, SOLUTION INTRAVENOUS; SUBCUTANEOUS at 09:19

## 2021-11-16 RX ADMIN — VALPROIC ACID 130 MG: 250 SOLUTION ORAL at 14:24

## 2021-11-16 RX ADMIN — METOPROLOL TARTRATE 12.5 MG: 25 TABLET, FILM COATED ORAL at 09:22

## 2021-11-16 RX ADMIN — VORICONAZOLE 200 MG: 40 POWDER, FOR SUSPENSION ORAL at 10:17

## 2021-11-16 RX ADMIN — VALPROIC ACID 130 MG: 250 SOLUTION ORAL at 09:22

## 2021-11-16 RX ADMIN — CASTOR OIL AND BALSAM, PERU: 788; 87 OINTMENT TOPICAL at 09:23

## 2021-11-16 RX ADMIN — SODIUM CHLORIDE, PRESERVATIVE FREE 10 ML: 5 INJECTION INTRAVENOUS at 09:23

## 2021-11-16 RX ADMIN — MEROPENEM 500 MG: 500 INJECTION, POWDER, FOR SOLUTION INTRAVENOUS at 11:59

## 2021-11-16 RX ADMIN — MIDODRINE HYDROCHLORIDE 5 MG: 5 TABLET ORAL at 09:22

## 2021-11-16 RX ADMIN — HEPARIN SODIUM 5000 UNITS: 5000 INJECTION INTRAVENOUS; SUBCUTANEOUS at 14:24

## 2021-11-16 RX ADMIN — MIDODRINE HYDROCHLORIDE 5 MG: 5 TABLET ORAL at 14:24

## 2021-11-16 RX ADMIN — ASPIRIN 81 MG: 81 TABLET, CHEWABLE ORAL at 09:22

## 2021-11-16 RX ADMIN — HEPARIN SODIUM 5000 UNITS: 5000 INJECTION INTRAVENOUS; SUBCUTANEOUS at 06:30

## 2021-11-16 RX ADMIN — INSULIN LISPRO 8 UNITS: 100 INJECTION, SOLUTION INTRAVENOUS; SUBCUTANEOUS at 12:02

## 2021-11-16 ASSESSMENT — PAIN SCALES - PAIN ASSESSMENT IN ADVANCED DEMENTIA (PAINAD)
TOTALSCORE: 0
CONSOLABILITY: 0
TOTALSCORE: 0
BODYLANGUAGE: 0
BODYLANGUAGE: 0
TOTALSCORE: 0
NEGVOCALIZATION: 0
FACIALEXPRESSION: 0
CONSOLABILITY: 0
BODYLANGUAGE: 0
BREATHING: 0
FACIALEXPRESSION: 0
NEGVOCALIZATION: 0
NEGVOCALIZATION: 0
BREATHING: 0
CONSOLABILITY: 0
BREATHING: 0
FACIALEXPRESSION: 0

## 2021-11-16 NOTE — DISCHARGE INSTR - COC
Complication associated with dialysis catheter T82. 9XXA    ESRD (end stage renal disease) ([facility] [address] N18.6    Anemia in ESRD (end-stage renal disease) ([facility]) N18.6, D63.1    Electrolyte imbalance E87.8    Sepsis ([facility]) A41.9    Altered mental status R41.82       Isolation/Infection:   Isolation            No Isolation          Unreconciled Outside Infections       Enable clinical decision support by reconciling outside information with the patient's chart. .      Infection Onset Last Indicated Last Received Source    VRE 09/15/21 09/15/21 09/28/21 [facility] [place] [address]          Patient Infection Status       None to display            Nurse Assessment:  Last Vital Signs: BP (!) 157/75   Pulse 81   Temp 98.9 °F (37.2 °C) (Axillary)   Resp 18   Ht 5' 2\" (1.575 m)   Wt 125 lb 10.6 oz (57 kg)   LMP  (LMP Unknown)   SpO2 97%   BMI 22.98 kg/m²     Last documented pain score (0-10 scale): Pain Level: 0  Last Weight:   Wt Readings from Last 1 Encounters:   11/16/21 125 lb 10.6 oz (57 kg)     Mental Status:  disoriented    IV Access:  - Dialysis Catheter  - site  right and subclavian, insertion date: ***    Nursing Mobility/ADLs:  Walking   Dependent  Transfer  Dependent  Bathing  Dependent  Dressing  Dependent  Toileting  Dependent  Feeding  Dependent  Med Admin  Dependent  Med Delivery   Crushed in PEG tube    Wound Care Documentation and Therapy:  Wound 11/14/21 Heel Right right heel has large black deep tissue injury (Active)   Wound Image   11/15/21 0952   Wound Etiology Deep tissue/Injury 11/16/21 0913   Dressing Status Dry;  Intact 11/15/21 0952   Wound Cleansed Not Cleansed 11/15/21 0952   Dressing/Treatment Pharmaceutical agent (see MAR) 11/16/21 0913   Offloading for Diabetic Foot Ulcers Offloading boot 11/15/21 0952   Dressing Change Due 11/15/21 11/15/21 0952   Wound Length (cm) 5 cm 11/15/21 0952   Wound Width (cm) 5.5 cm 11/15/21 0952   Wound Depth (cm) 0 cm 11/15/21 0952   Wound Surface Area (cm^2) 27.5 cm^2 11/15/21 0952   Wound Volume (cm^3) 0 cm^3 11/15/21 0952   Wound Assessment Blood filled blister; Dry 11/15/21 0952   Drainage Amount None 11/15/21 0952   Odor None 11/15/21 0952   Tyesha-wound Assessment Dry/flaky 11/15/21 0952   Margins Attached edges; Defined edges 11/15/21 0952   Number of days: 1       Wound 11/14/21 Foot Right; Lateral (Active)   Wound Image   11/15/21 0952   Wound Etiology Deep tissue/Injury 11/16/21 0913   Dressing Status Dry; Intact 11/15/21 0952   Wound Cleansed Not Cleansed 11/15/21 0952   Dressing/Treatment Pharmaceutical agent (see MAR) 11/16/21 0913   Offloading for Diabetic Foot Ulcers Offloading boot 11/15/21 0952   Dressing Change Due 11/15/21 11/15/21 0952   Wound Length (cm) 0.7 cm 11/15/21 0952   Wound Width (cm) 0.4 cm 11/15/21 0952   Wound Depth (cm) 0 cm 11/15/21 0952   Wound Surface Area (cm^2) 0.28 cm^2 11/15/21 0952   Wound Volume (cm^3) 0 cm^3 11/15/21 0952   Wound Assessment Dry; Purple/maroon 11/15/21 0952   Drainage Amount None 11/15/21 0952   Odor None 11/15/21 0952   Tyesha-wound Assessment Dry/flaky 11/15/21 0952   Margins Attached edges;  Defined edges 11/15/21 0952   Number of days: 1       Wound 11/14/21 Coccyx stage 2 pressure ulcer with surrounding redness (Active)   Wound Image   11/15/21 0952   Wound Etiology Pressure Stage  2 11/16/21 0913   Dressing Status New dressing applied 11/15/21 0952   Wound Cleansed Other (Comment) 11/15/21 0952   Dressing/Treatment Foam 11/15/21 0952   Dressing Change Due 11/18/21 11/15/21 0952   Wound Length (cm) 2.5 cm 11/15/21 0952   Wound Width (cm) 2 cm 11/15/21 0952   Wound Depth (cm) 0.1 cm 11/15/21 0952   Wound Surface Area (cm^2) 5 cm^2 11/15/21 0952   Wound Volume (cm^3) 0.5 cm^3 11/15/21 0952   Wound Assessment Bleeding; Pink/red 11/15/21 0952   Drainage Amount Scant 11/15/21 0952   Drainage Description Sanguinous 11/15/21 0952   Odor None 11/15/21 0952   Tyesha-wound Assessment Blanchable erythema 11/15/21 0952   Margins Attached edges; Defined edges 11/15/21 0952   Number of days: 1        Elimination:  Continence: Bowel: No  Bladder: No  Urinary Catheter: None   Colostomy/Ileostomy/Ileal Conduit: Yes  Colostomy RLQ-Stomal Appliance: 2 piece, Clean, Dry, Intact  Colostomy RLQ-Flange Size (inches): 1.5 Inches  Colostomy RLQ-Stoma  Assessment: Pink, Protrudes, Moist  Colostomy RLQ-Mucocutaneous Junction: Intact  Colostomy RLQ-Peristomal Assessment: Clean, Intact  Colostomy RLQ-Treatment: Bag change  Colostomy RLQ-Stool Appearance: Loose  Colostomy RLQ-Stool Color: Brown  Colostomy RLQ-Stool Amount: Small  Colostomy RLQ-Output (mL): 0 ml    Date of Last BM: 11/15/2021    Intake/Output Summary (Last 24 hours) at 11/16/2021 1147  Last data filed at 11/16/2021 0943  Gross per 24 hour   Intake 160 ml   Output 825 ml   Net -665 ml     I/O last 3 completed shifts: In: 150 [P.O.:30; NG/GT:120]  Out: 825 [Urine:825]    Safety Concerns:     Aspiration Risk    Impairments/Disabilities:      Speech    Nutrition Therapy:  Current Nutrition Therapy:   - Oral Diet:  Dysphagia 1 pureed and Low Sodium (2gm)  Tube feed @ 40 mL    Routes of Feeding: Gastrostomy Tube & oral  Liquids: Honey Thick Liquids  Daily Fluid Restriction: no  Last Modified Barium Swallow with Video (Video Swallowing Test): not done    Treatments at the Time of Hospital Discharge:   Respiratory Treatments: ***  Oxygen Therapy:  is not on home oxygen therapy. Ventilator:    - No ventilator support    Heart Failure Instructions for Daily Management  Patient was treated for chronic combined systolic and diastolic heart failure. she  will require the following:    Please weigh daily on the same scale and approximately the same time of day. Report weight gain of 3 pounds/day or 5 pounds/week to : justa GONSALEZ, [address] [place] [address] and [facility] [phone]. Please use hospital discharge weight as baseline reference.    Please monitor for signs and symptoms of and report to MD:  Worsening Heart Failure: sudden weight gain, shortness of breath, lower extremity or general edema/swelling, abdominal bloating/swelling, inability to lie flat, intolerance to usual activity, or cough (especially at night). Report these finding even if no increase in weight. Dehydration:  having difficulty or a decrease in urination, dizziness, worsening fatigue, or new onset/worsening of generalized weakness. Please continue a LOW SODIUM diet and LIMIT fluid intake to 48 - 64 ounces ( 1.5 - 2 liters) per day. Call Pradeep Parker , [facility] MD, and [facility] [phone] with any questions or concerns. Please continue heart failure education to patient and family/support system. See After Visit Summary for hospital follow up appointment details. Consider spiritual care referral for support and/or completion of advance directives . Consider: having the facility MD complete required 7 day follow up, [facility] if patient agreeable and able to participate, and palliative care consult for ongoing goals of care, end of life, and/or chronic disease management discussions. Dr Alberto Hill is pt's primary cardiologist (formerly Dr Delfino Bazzi who is no longer associated with [facility]). Pt has EF 15-20% gr 2 DD which was NEW finding 10/15/2021. She has had no further cardiology follow up since that time.          Rehab Therapies: ***  Weight Bearing Status/Restrictions: No weight bearing restirctions  Other Medical Equipment (for information only, NOT a DME order):  hospital bed  Other Treatments: ***    Patient's personal belongings (please select all that are sent with patient):  None    RN SIGNATURE:  Electronically signed by Kwasi Ramirez RN on 11/16/21 at 2:00 PM EST    CASE MANAGEMENT/SOCIAL WORK SECTION    Inpatient Status Date: ***    Readmission Risk Assessment Score:  Readmission Risk              Risk of Unplanned Readmission:  36           Discharging to Facility/ [address]  Fax Failed  [address], [address] [facility] [address]       Phone: [phone]       Fax: [phone]          / signature: Electronically signed by Govind Cooper RN on 11/16/21 at 12:00 PM EST    PHYSICIAN SECTION    Prognosis: Fair    Condition at Discharge: Stable    Rehab Potential (if transferring to Rehab): Fair    Recommended Labs or Other Treatments After Discharge: Continue Merrem for 3 days and Voriconazole for 5 days    Physician Certification: I certify the above information and transfer of Christina Adjutant  is necessary for the continuing treatment of the diagnosis listed and that she requires LTAC for 30 days.      Update Admission H&P: No change in H&P    PHYSICIAN SIGNATURE:  Electronically signed by Asher Vazquez and Erna Gar MD on 11/16/21 at 11:48 AM EST

## 2021-11-16 NOTE — PROGRESS NOTES
Patient alert to self only. VSS O2 off with sats remaining <90% on RA. Patient with IV in R ac with intermittent IV abx infusions. Gtube flushed and medications given. Tube feed going at goal rate with no issues. Patient on specialty mattress. Purewick in place. Patient denies any other needs at this time. Bed is in the lowest position, call light and bedside table within reach. Patient bed alarm is on. Will continue to monitor for changes in patient status.      Electronically signed by Adrienne Phan RN on 11/16/2021 at 1:45 PM

## 2021-11-16 NOTE — CONSULTS
Nephrology Consult Note                                                                                                                                                                                                                                                                                                                                                               Office : [phone]     Fax :[phone]              Patient's Name: Barbie Brady  11/15/2021    Reason for Consult:  ESRD   Requesting Physician:  Anthony Mccray [facility] CAM      Chief Complaint:  AMS     History of Present Ilness:    Chandan Pace is a 79 yo F PMH ESRD on HD, CAD s/p CABG, DMII, HLD, CVA w/ right-sided deficits, HTN, 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 Nursing facility reported that pt had fever and was unresponsive  Fever +    Past Medical History:   Diagnosis Date    CAD (coronary artery disease)     s/p CABG 2000. Cath 9/10 1/3 grafts occluded. Patent LIMA-LAD, patent SVG-OM, 60% distal LAD lesion distal to anastamosis, occluded SVG-LCX,, 70% prox native LCx, diffuse RCA dz. Declines redo CABG.   will consider PCI of LAD and LCX    Carotid artery disease ([facility] [address]     Diabetes mellitus ([facility] [address]     followed by PCP    Hyperlipidemia     rec semi annual lipids w/ LDL goal <70    Lung mass     likely benign    Syncope     secondary to medications       Past Surgical History:   Procedure Laterality Date    BREAST SURGERY      left breast tumor removed    CARDIAC SURGERY  2000    CABG    CHOLECYSTECTOMY      CORONARY ANGIOPLASTY WITH STENT PLACEMENT  2019    CORONARY ARTERY BYPASS GRAFT      DIAGNOSTIC CARDIAC CATH LAB PROCEDURE      KNEE SURGERY      UPPER GASTROINTESTINAL ENDOSCOPY N/A 10/14/2019    EGD DIAGNOSTIC ONLY performed by Desmond Mercado MD at [address] [place] [address] 10/18/2021    EGD BIOPSY performed by Samantha Poole MD at [facility] ENDOSCOPY       Family History   Problem Relation Age of Onset    Stroke Mother     Heart Disease Father         reports that she has never smoked. She has never used smokeless tobacco. She reports that she does not drink alcohol and does not use drugs.     Allergies:  Iodine    Current Medications:    [START ON 11/16/2021] meropenem (MERREM) 500 mg IVPB (mini-bag), Q24H  Venelex ointment, BID  metoprolol tartrate (LOPRESSOR) tablet 12.5 mg, BID  voriconazole (VFEND) 40 MG/ML suspension 200 mg, 2 times per day  sodium chloride flush 0.9 % injection 5-40 mL, 2 times per day  sodium chloride flush 0.9 % injection 5-40 mL, PRN  0.9 % sodium chloride infusion, PRN  polyethylene glycol (GLYCOLAX) packet 17 g, Daily PRN  acetaminophen (TYLENOL) tablet 650 mg, Q6H PRN   Or  acetaminophen (TYLENOL) suppository 650 mg, Q6H PRN  heparin (porcine) injection 5,000 Units, 3 times per day  aspirin chewable tablet 81 mg, Daily  atorvastatin (LIPITOR) tablet 80 mg, Nightly  midodrine (PROAMATINE) tablet 5 mg, TID  valproic acid (DEPAKENE) 250 MG/5ML oral solution 130 mg, TID  glucose (GLUTOSE) 40 % oral gel 15 g, PRN  dextrose 50 % IV solution, PRN  glucagon (rDNA) injection 1 mg, PRN  dextrose 5 % solution, PRN  insulin lispro (1 Unit Dial) 0-12 Units, TID WC  insulin lispro (1 Unit Dial) 0-6 Units, Nightly  insulin glargine (LANTUS;BASAGLAR) injection pen 10 Units, Nightly        Review of Systems:   Lethargic       Physical exam:     Vitals:  /63   Pulse 100   Temp 98.5 °F (36.9 °C) (Oral)   Resp 16   Ht 5' 2\" (1.575 m)   Wt 126 lb 1.7 oz (57.2 kg)   LMP  (LMP Unknown)   SpO2 99%   BMI 23.06 kg/m²   Constitutional: lethargic   Skin: no rash, turgor wnl  Heent:  mmm  Neck: no bruits or jvd noted  Cardiovascular:  S1, S2 without m/r/g  Respiratory: CTA B without w/r/r  Abdomen:  +bs, soft, nt, nd  Ext:+ lower extremity edema  Psychiatric: mood and affect flat   Musculoskeletal:  Rom, muscular strength dec     Data:   Labs:  CBC:   Recent Labs     11/14/21  1446 11/15/21  0312   WBC 8.9 8.9   HGB 8.3* 7.5*    156     BMP:    Recent Labs     11/14/21  1446 11/15/21  0312    142   K 3.2* 3.5   CL 94* 101   CO2 30 28   BUN 55* 51*   CREATININE 1.3* 1.3*   GLUCOSE 236* 150*     Ca/Mg/Phos:   Recent Labs     11/14/21  1446 11/15/21  0312   CALCIUM 9.1 8.8   MG 2.30 2.10     Hepatic:   Recent Labs     11/14/21  1446   AST 22   ALT 25   BILITOT 0.3   ALKPHOS 143*     Troponin:   Recent Labs     11/14/21  2204 11/15/21  0312 11/15/21  0606   TROPONINI 0.26* 0.27* 0.28*     BNP: No results for input(s): BNP in the last 72 hours. Lipids: No results for input(s): CHOL, TRIG, HDL, LDLCALC, LABVLDL in the last 72 hours. ABGs: No results for input(s): PHART, PO2ART, YQF3PVS in the last 72 hours. INR: No results for input(s): INR in the last 72 hours. UA:  Recent Labs     11/14/21  1445   COLORU Yellow   CLARITYU CLOUDY*   GLUCOSEU Negative   BILIRUBINUR Negative   KETUA Negative   SPECGRAV 1.015   BLOODU SMALL*   PHUR 6.0   PROTEINU 100*   UROBILINOGEN 0.2   NITRU Negative   LEUKOCYTESUR LARGE*   LABMICR YES   URINETYPE NotGiven      Urine Microscopic:   Recent Labs     11/14/21  1445   BACTERIA Rare*   WBCUA >100*   RBCUA None seen   EPIU 11-20*     Urine Culture:   Recent Labs     11/14/21  1620   LABURIN No growth at 18 to 36 hours     Urine Chemistry: No results for input(s): CLUR, LABCREA, PROTEINUR, NAUR in the last 72 hours. IMAGING:  CT ABDOMEN PELVIS WO CONTRAST Additional Contrast? None   Final Result      Decreased right pleural effusion with persistent right lower lobe consolidation/atelectasis. Unchanged rounded hypodense lesion in the right lobe of the liver. Postsurgical changes in the abdomen, with gastrostomy and ileostomy noted. No new acute findings in the abdomen or pelvis.       CT Head WO Contrast   Final Result Chronic atrophic and periventricular ischemic changes of the brain without acute hemorrhage, edema or hydrocephalus. No subdural or epidural hematoma appreciated       XR CHEST PORTABLE   Final Result      1. No acute process or consolidation   2. Right IJ central venous catheter remains in place with the tips at the atriocaval junction. Improved aeration noted in the right lung compared to prior study                   Assessment/Plan   1. ESRD     2. HTN    3. Anemia    4. Acid- base/ Electrolyte imbalance     5.  Fever       Plan   - Cx   - Abx   - No need for HD today   - Monitor labs   - cont anemia management   - cont MBD management   - labs in am                 Thank you for allowing us to participate in care of Lesia Cantor MD  Feel free to contact me   [facility] [address]  Office : [phone]  Fax :[phone]

## 2021-11-16 NOTE — CARE COORDINATION
CM following, pt from home [address] [facility] [address] plan to return back no precert needed.   Electronically signed by Nataly Park RN on 11/15/2021 at 4:15 PM  [phone]
providers.  Yes    Care Transitions patient: No    Belkys Gray RN  [facility] [employer]  Case Management Department  Ph: [phone]  Fax: [phone]

## 2021-11-16 NOTE — PROGRESS NOTES
Patient alert and oriented to self. . VSS Tube feeds infusing at rate goal 40 ml/hr. Ostomy tube to RQ, no output noted . Purewick in place. Patient Q2h turned and repositioned. Patient on specialty mattress. Patient in bed lowest position call light and bedside table within reach. All needs are met at this time. Patient aware to call if any help needed. Will continue to monitor  /62   Pulse 98   Temp 99.2 °F (37.3 °C) (Oral)   Resp 18   Ht 5' 2\" (1.575 m)   Wt 126 lb 1.7 oz (57.2 kg)   LMP  (LMP Unknown)   SpO2 93%   BMI 23.06 kg/m²

## 2021-11-16 NOTE — DISCHARGE SUMMARY
[address] SUMMARY    Patient ID: Jodie Rodriguez                                             Discharge Date: 11/16/2021   Patient's PCP: Mabel Oliveira                                          Discharge Physician: Eloy Altamirano MD MD  Admit Date: 11/14/2021   Admitting Physician: Verner Robert, MD    DISCHARGE DIAGNOSES:  Present on Admission:   Altered mental status      Hospital Course:    HPI: Jodie Rodriguez is a 80 y.o. female with PMHx ESRD on HD (T/TH/S), CAD, type II DM, old CVA with right-sided deficits, hypertension, ischemic bowel status post colostomy (7/21), G-tube dependent feeds and meds status post PEG(9/21) who presented from a nursing home for AMS. In the ED, temp 99.2, lactic acid 2.3, proBNP 98787, troponin of 0.25, EKG without ischemic changes. Urinalysis was done which showed more than 100 WBC with large leukocyte esterase and the presence of epithelial cells. CT head abdomen pelvis and CXR negative for any acute processes. Patient was given Rocephin and voriconazole via G-tube. Patient was admitted for further evaluation of altered mental status. Urine and blood cultures were no growth to date at discharge. Patient completed 3 days of Merrem and Voriconazole. Patient does have a history of urosepsis with a culture that grew candida glabrata and will be discharged for an additional 5 days of Voriconazole for fungal coverage. On the day of discharge, patient's altered mental status has resolved. Patient was alert and oriented x3. She was hemodynamically stable and afebrile. Patient denied fever, chills, shortness of breath, chest pain, palpitations, nausea, vomiting, abdominal pain, changes in urinary/bowel movements.     Physical Exam:  BP (!) 157/75   Pulse 81   Temp 98.9 °F (37.2 °C) (Axillary)   Resp 18   Ht 5' 2\" (1.575 m)   Wt 125 lb 10.6 oz (57 kg)   LMP  (LMP Unknown)   SpO2 97%   BMI 22.98 kg/m²     General Appearance:    Alert, cooperative, no distress, appears stated age   Head:    Normocephalic, without obvious abnormality, atraumatic   Eyes:    PERRL, conjunctiva/corneas clear, EOM's intact, fundi     benign, both eyes   Ears:    Normal TM's and external ear canals, both ears   Nose:   Nares normal, septum midline, mucosa normal, no drainage    or sinus tenderness   Throat:   Lips, mucosa, and tongue normal; teeth and gums normal   Neck:   Supple, symmetrical, trachea midline, no adenopathy;     thyroid:  no enlargement/tenderness/nodules; no carotid    bruit or JVD   Back:     Symmetric, no curvature, ROM normal, no CVA tenderness   Lungs:     Clear to auscultation bilaterally, respirations unlabored   Chest Wall:    No tenderness or deformity    Heart:    Regular rate and rhythm, S1 and S2 normal, no murmur, rub   or gallop   Breast Exam:    No tenderness, masses, or nipple abnormality   Abdomen:     Soft, non-tender, bowel sounds active all four quadrants,     no masses, no organomegaly. G-tube appears non-erythematous. Colostomy bag properly draining stool.     Genitalia:    Normal female without lesion, discharge or tenderness   Rectal:    Normal tone ;guaiac negative stool   Extremities:   Extremities normal, atraumatic, no cyanosis or edema   Pulses:   2+ and symmetric all extremities   Skin:   Skin color, texture, turgor normal, no rashes or lesions   Lymph nodes:   Cervical, supraclavicular, and axillary nodes normal   Neurologic:   CNII-XII intact, normal strength, sensation and reflexes     throughout       Consults: nephrology  Significant Diagnostic Studies:  chest x-ray  Treatments: antibiotics: Meropenem and voriconazole  Disposition: long term care facility  Discharged Condition: Stable  Follow Up: Primary Care Physician in one week    DISCHARGE MEDICATION:     Medication List      START taking these medications    voriconazole 40 MG/ML suspension  Commonly known as: VFEND  5 mLs by Per G Tube route discharge.      Maycol Song MD FACP

## 2021-11-16 NOTE — PLAN OF CARE
Problem: Falls - Risk of:  Goal: Will remain free from falls  Description: Will remain free from falls  Outcome: Ongoing  Note: Patient remains free from physical injury. Fall precautions in place: Patient in bed lowest position,wheels locked. 2/4 side rails up Call light and beside table within reach. Will continue to monitor       Problem: Skin Integrity:  Goal: Absence of new skin breakdown  Description: Absence of new skin breakdown  Outcome: Ongoing  Note: Skin assessment done this shift. No new sign of skin breakdown noted. Patient incontinent cleaned and changed as needed. Will continue to monitor       Problem: Nutrition  Goal: Optimal nutrition therapy  Outcome: Ongoing  Note: Tube feeds infusing at goal rate 40 ml/h. Patient tolerates well.  Will continue to monitor

## 2021-11-16 NOTE — PROGRESS NOTES
IV removed and dressing placed. Patient report given to transport. Report called to [address] [place] [address] with no issues.     Electronically signed by Berniece Curling, RN on 11/16/2021 at 3:57 PM

## 2021-11-17 NOTE — PROGRESS NOTES
Nephrology  Note                                                                                                                                                                                                                                                                                                                                                               Office : [phone]     Fax :[phone]              Patient's Name: Jeanette Porras  11/16/2021    No new change   No N/V/D       Past Medical History:   Diagnosis Date    CAD (coronary artery disease)     s/p CABG 2000. Cath 9/10 1/3 grafts occluded. Patent LIMA-LAD, patent SVG-OM, 60% distal LAD lesion distal to anastamosis, occluded SVG-LCX,, 70% prox native LCx, diffuse RCA dz. Declines redo CABG. will consider PCI of LAD and LCX    [facility]otid artery [facility] ([facility]     Diabetes mellitus ([facility]     followed by PCP    Hyperlipidemia     rec semi annual lipids w/ LDL goal <70    Lung mass     likely benign    Syncope     secondary to medications       Past Surgical History:   Procedure Laterality Date    BREAST SURGERY      left breast tumor removed    CARDIAC SURGERY  2000    CABG    CHOLECYSTECTOMY      CORONARY ANGIOPLASTY WITH STENT PLACEMENT  2019    CORONARY ARTERY BYPASS GRAFT      DIAGNOSTIC CARDIAC CATH LAB PROCEDURE      KNEE SURGERY      UPPER GASTROINTESTINAL ENDOSCOPY N/A 10/14/2019    EGD DIAGNOSTIC ONLY performed by Indio Del Castillo MD at [address] [facility] N/A 10/18/2021    EGD BIOPSY performed by Tk Simon MD at [address] History   Problem Relation Age of Onset    Stroke Mother     Heart Disease Father         reports that she has never smoked. She has never used smokeless tobacco. She reports that she does not drink alcohol and does not use drugs.     Allergies:  Iodine    Current Medications:    No current facility-administered medications for this encounter. Review of Systems:   Lethargic       Physical exam:     Vitals:  /68   Pulse 89   Temp 97.9 °F (36.6 °C) (Axillary)   Resp 16   Ht 5' 2\" (1.575 m)   Wt 125 lb 10.6 oz (57 kg)   LMP  (LMP Unknown)   SpO2 98%   BMI 22.98 kg/m²   Constitutional: lethargic   Skin: no rash, turgor wnl  Heent:  mmm  Neck: no bruits or jvd noted  Cardiovascular:  S1, S2 without m/r/g  Respiratory: CTA B without w/r/r  Abdomen:  +bs, soft, nt, nd  Ext:+ lower extremity edema  Psychiatric: mood and affect flat   Musculoskeletal:  Rom, muscular strength dec     Data:   Labs:  CBC:   Recent Labs     11/14/21  1446 11/15/21  0312 11/16/21  0556   WBC 8.9 8.9 9.6   HGB 8.3* 7.5* 7.7*    156 184     BMP:    Recent Labs     11/14/21  1446 11/15/21  0312 11/16/21  0556    142 144   K 3.2* 3.5 3.7   CL 94* 101 101   CO2 30 28 30   BUN 55* 51* 49*   CREATININE 1.3* 1.3* 1.4*   GLUCOSE 236* 150* 314*     Ca/Mg/Phos:   Recent Labs     11/14/21  1446 11/15/21  0312 11/16/21  0556   CALCIUM 9.1 8.8 9.5   MG 2.30 2.10  --      Hepatic:   Recent Labs     11/14/21  1446   AST 22   ALT 25   BILITOT 0.3   ALKPHOS 143*     Troponin:   Recent Labs     11/14/21  2204 11/15/21  0312 11/15/21  0606   TROPONINI 0.26* 0.27* 0.28*     BNP: No results for input(s): BNP in the last 72 hours. Lipids: No results for input(s): CHOL, TRIG, HDL, LDLCALC, LABVLDL in the last 72 hours. ABGs: No results for input(s): PHART, PO2ART, HLT0NYA in the last 72 hours. INR: No results for input(s): INR in the last 72 hours.   UA:  Recent Labs     11/14/21  1445   COLORU Yellow   CLARITYU CLOUDY*   GLUCOSEU Negative   BILIRUBINUR Negative   KETUA Negative   SPECGRAV 1.015   BLOODU SMALL*   PHUR 6.0   PROTEINU 100*   UROBILINOGEN 0.2   NITRU Negative   LEUKOCYTESUR LARGE*   LABMICR YES   URINETYPE NotGiven      Urine Microscopic:   Recent Labs     11/14/21  1445   BACTERIA Rare*   WBCUA >100*   RBCUA None seen   EPIU 11-20*     Urine Culture: Recent Labs     11/14/21  1620   LABURIN No growth at 18 to 36 hours     Urine Chemistry: No results for input(s): CLUR, LABCREA, PROTEINUR, NAUR in the last 72 hours. IMAGING:  CT ABDOMEN PELVIS WO CONTRAST Additional Contrast? None   Final Result      Decreased right pleural effusion with persistent right lower lobe consolidation/atelectasis. Unchanged rounded hypodense lesion in the right lobe of the liver. Postsurgical changes in the abdomen, with gastrostomy and ileostomy noted. No new acute findings in the abdomen or pelvis. CT Head WO Contrast   Final Result      Chronic atrophic and periventricular ischemic changes of the brain without acute hemorrhage, edema or hydrocephalus. No subdural or epidural hematoma appreciated       XR CHEST PORTABLE   Final Result      1. No acute process or consolidation   2. Right IJ central venous catheter remains in place with the tips at the atriocaval junction. Improved aeration noted in the right lung compared to prior study                   Assessment/Plan   1. ESRD     2. HTN    3. Anemia    4. Acid- base/ Electrolyte imbalance     5.  Fever       Plan   - Cx Ur - Candida Glabrate   - Voriconazole  - No need for HD today   - Monitor labs   - cont anemia management   - cont MBD management   - labs in am                 Thank you for allowing us to participate in care of Ara Tamayo MD  Feel free to contact me   [facility] [address]  Office : [phone]  Fax :[phone]

## 2021-11-18 LAB
BLOOD CULTURE, ROUTINE: NORMAL
CULTURE, BLOOD 2: NORMAL

## 2021-11-18 NOTE — PROGRESS NOTES
Physician Progress Note      PATIENT:               Apolinar Gonzales  CSN #:                  [id number]  :                       1950  ADMIT DATE:       2021 12:55 PM  [address] DATE:        2021 3:48 PM  RESPONDING  PROVIDER #:        Negrito GOODRICH MD          QUERY TEXT:    Patient admitted with AMS and noted to have NSTEMI 2/2 demand ischemia. If   possible, please document in the progress notes and discharge summary if you   are evaluating and/or treating any of the following: The medical record reflects the following:  Risk Factors: 81 y/o female with ESRD with elevated troponins  Clinical Indicators: Per ED PN: troponin was elevated to 0.25 however this was   stable and her EKG showed no evidence of ischemia  H&P: Elevated troponin- likely NSTEMI 2/2 demand ischemia- EKG without acute   ischemic changes- will check one more troponin Troponin Trends: 0.25, 0.26,   0.26, 0.27, 028 No cardiology consult  Treatment: Monitor troponins, IVF bolus  Options provided:  -- Demand ischemia, MI ruled out  -- Demand ischemia with MI  -- Other - I will add my own diagnosis  -- Disagree - Not applicable / Not valid  -- Disagree - Clinically unable to determine / Unknown  -- Refer to Clinical Documentation Reviewer    PROVIDER RESPONSE TEXT:    This patient has Demand ischemia, MI ruled out.     Query created by: Renetta Larsen on 2021 8:59 AM      Electronically signed by:  Lalo Herring MD 2021 1:07 PM

## 2021-11-18 NOTE — PROGRESS NOTES
Physician Progress Note      PATIENT:               Sima Gonzalez  CSN #:                  [id number]  :                       1950  ADMIT DATE:       2021 12:55 PM  [address] DATE:        2021 3:48 PM  RESPONDING  PROVIDER #:        Hilaria GOODRICH MD          QUERY TEXT:    Pt admitted with UTI. Pt noted to have AMS 2/2 UTI. If possible, please   document in the progress notes and discharge summary if you are evaluating and   / or treating any of the following: The medical record reflects the following:  Risk Factors: 79 y/o female with UTI  Clinical Indicators: ED PN: Eli Hummel is a 80 y.o. female presenting   with altered mental status, likely due to urinary tract infection. Patient   was at her baseline mental status on my exam, following commands. CT of   patient's head showed no acute changes. Her work-up revealed that she likely   has a UTI. In the past she did grow Candida glabrata in her urine and   therefore we started her on ceftriaxone and voriconazole while a urine culture   was pending. She was also given 500 mL of fluid. \"  11/15 PN: \"Acute   encephalopathy; Altered Mental Status; Likely 2/2 UTI. \"  Treatment: 500 ML of fluid, bed alarm  Options provided:  -- Acute Metabolic encephalopathy due to UTI  -- Other - I will add my own diagnosis  -- Disagree - Not applicable / Not valid  -- Disagree - Clinically unable to determine / Unknown  -- Refer to Clinical Documentation Reviewer    PROVIDER RESPONSE TEXT:    This patient has Acute metabolic encephalopathy due to UTI. Query created by: Nikos Seo on 2021 8:37 AM      QUERY TEXT:    Pt admitted with UTI. Pt noted to have elevated Temp  If possible, please   document in the progress notes and discharge summary if you are evaluating and   /or treating any of the following:     The medical record reflects the following:  Risk Factors: 79 y/o female with UTI  Clinical Indicators: per ED PN: Physical Exam Constitutional:  Comments:   Chronically ill-appearing  11/15 Temp 100.6 Axillary   11/15 PN:   \"Urinary Tract infection Past Ucx (10/15/21) w/ Candida globrata treated with   voriconazole  U/A: Cloudy, large leukocyte esterase, more than 100 WBC  Likely   contributory for mental status change\"  Treatment: ID consult, Lab monitoring, 500 ml IVF bolus, Urine culture, Blood   cultures x 2, Merrem, Rocephin,  Options provided:  -- Sepsis, not present on admission due to UTI  -- UTI without Sepsis  -- Other - I will add my own diagnosis  -- Disagree - Not applicable / Not valid  -- Disagree - Clinically unable to determine / Unknown  -- Refer to Clinical Documentation Reviewer    PROVIDER RESPONSE TEXT:    This patient has sepsis that was not present on admission, due to UTI.     Query created by: Carrie Michelle on 11/18/2021 8:49 AM      Electronically signed by:  Magda Pollack MD 11/18/2021 8:57 AM

## 2023-07-30 NOTE — PROGRESS NOTES
Patient is now able to tell me her name and that she is at a hospital. Her become slurred and difficult to understand. She is moving all extremities but tends to keep her left arm and legs in bent position. Repositioned in bed. Call light within reach. [facility] [place] Hearing screening results were discussed with parent. Questions answered. Brochure given to parent. Advised to monitor developmental milestones and contact physician for any concerns.    Patricia Romo, Violet

## (undated) DEVICE — FORCEPS BX L240CM JAW DIA2.8MM L CAP W/ NDL MIC MESH TOOTH

## (undated) DEVICE — ENDOSCOPY KIT: Brand: MEDLINE INDUSTRIES, INC.

## (undated) DEVICE — BITE BLK 60FR GRN ENDOSCP AD W STRP SLD DISPOSABLE

## (undated) DEVICE — MASK CAPNOGRAPHY AD W35IN DIA58IN SAMP LN L10FT O2 LN